# Patient Record
Sex: FEMALE | Race: BLACK OR AFRICAN AMERICAN | Employment: UNEMPLOYED | URBAN - METROPOLITAN AREA
[De-identification: names, ages, dates, MRNs, and addresses within clinical notes are randomized per-mention and may not be internally consistent; named-entity substitution may affect disease eponyms.]

---

## 2017-10-31 ENCOUNTER — HOSPITAL ENCOUNTER (OUTPATIENT)
Dept: MAMMOGRAPHY | Age: 52
Discharge: HOME | End: 2017-10-31
Attending: FAMILY MEDICINE
Payer: COMMERCIAL

## 2017-10-31 DIAGNOSIS — Z12.31 VISIT FOR SCREENING MAMMOGRAM: ICD-10-CM

## 2017-10-31 PROCEDURE — 77067 SCR MAMMO BI INCL CAD: CPT

## 2018-07-02 ENCOUNTER — HOSPITAL ENCOUNTER (EMERGENCY)
Age: 53
Discharge: HOME | End: 2018-07-03
Attending: EMERGENCY MEDICINE
Payer: COMMERCIAL

## 2018-07-02 ENCOUNTER — APPOINTMENT (OUTPATIENT)
Dept: GENERAL RADIOLOGY | Age: 53
End: 2018-07-02
Attending: PHYSICIAN ASSISTANT
Payer: COMMERCIAL

## 2018-07-02 ENCOUNTER — APPOINTMENT (OUTPATIENT)
Dept: CT IMAGING | Age: 53
End: 2018-07-02
Attending: EMERGENCY MEDICINE
Payer: COMMERCIAL

## 2018-07-02 VITALS
WEIGHT: 242.51 LBS | DIASTOLIC BLOOD PRESSURE: 77 MMHG | TEMPERATURE: 98.8 F | HEART RATE: 95 BPM | OXYGEN SATURATION: 97 % | RESPIRATION RATE: 18 BRPM | SYSTOLIC BLOOD PRESSURE: 123 MMHG

## 2018-07-02 DIAGNOSIS — R07.89 ATYPICAL CHEST PAIN: Primary | ICD-10-CM

## 2018-07-02 LAB
ALBUMIN SERPL-MCNC: 3.7 G/DL (ref 3.5–5)
ALBUMIN/GLOB SERPL: 0.9 {RATIO} (ref 1.1–2.2)
ALP SERPL-CCNC: 49 U/L (ref 45–117)
ALT SERPL-CCNC: 19 U/L (ref 12–78)
ANION GAP SERPL CALC-SCNC: 10 MMOL/L (ref 5–15)
APTT PPP: 37 SEC (ref 22.1–32)
AST SERPL-CCNC: 15 U/L (ref 15–37)
BASOPHILS # BLD: 0 K/UL (ref 0–0.1)
BASOPHILS NFR BLD: 0 % (ref 0–1)
BILIRUB SERPL-MCNC: 0.9 MG/DL (ref 0.2–1)
BUN SERPL-MCNC: 13 MG/DL (ref 6–20)
BUN/CREAT SERPL: 12 (ref 12–20)
CALCIUM SERPL-MCNC: 8.9 MG/DL (ref 8.5–10.1)
CHLORIDE SERPL-SCNC: 102 MMOL/L (ref 97–108)
CK MB CFR SERPL CALC: NORMAL % (ref 0–2.5)
CK MB SERPL-MCNC: <1 NG/ML (ref 5–25)
CK SERPL-CCNC: 89 U/L (ref 26–192)
CO2 SERPL-SCNC: 28 MMOL/L (ref 21–32)
CREAT SERPL-MCNC: 1.05 MG/DL (ref 0.55–1.02)
D DIMER PPP FEU-MCNC: 0.22 MG/L FEU (ref 0–0.65)
DIFFERENTIAL METHOD BLD: ABNORMAL
EOSINOPHIL # BLD: 0.1 K/UL (ref 0–0.4)
EOSINOPHIL NFR BLD: 1 % (ref 0–7)
ERYTHROCYTE [DISTWIDTH] IN BLOOD BY AUTOMATED COUNT: 20.3 % (ref 11.5–14.5)
GLOBULIN SER CALC-MCNC: 4.2 G/DL (ref 2–4)
GLUCOSE SERPL-MCNC: 94 MG/DL (ref 65–100)
HCT VFR BLD AUTO: 36.1 % (ref 35–47)
HGB BLD-MCNC: 12.1 G/DL (ref 11.5–16)
IMM GRANULOCYTES # BLD: 0 K/UL (ref 0–0.04)
IMM GRANULOCYTES NFR BLD AUTO: 0 % (ref 0–0.5)
INR PPP: 2.6 (ref 0.9–1.1)
LIPASE SERPL-CCNC: 171 U/L (ref 73–393)
LYMPHOCYTES # BLD: 1.6 K/UL (ref 0.8–3.5)
LYMPHOCYTES NFR BLD: 25 % (ref 12–49)
MCH RBC QN AUTO: 27.1 PG (ref 26–34)
MCHC RBC AUTO-ENTMCNC: 33.5 G/DL (ref 30–36.5)
MCV RBC AUTO: 80.8 FL (ref 80–99)
MONOCYTES # BLD: 0.4 K/UL (ref 0–1)
MONOCYTES NFR BLD: 7 % (ref 5–13)
NEUTS SEG # BLD: 4.1 K/UL (ref 1.8–8)
NEUTS SEG NFR BLD: 67 % (ref 32–75)
NRBC # BLD: 0 K/UL (ref 0–0.01)
NRBC BLD-RTO: 0 PER 100 WBC
PLATELET # BLD AUTO: 278 K/UL (ref 150–400)
PMV BLD AUTO: 9.4 FL (ref 8.9–12.9)
POTASSIUM SERPL-SCNC: 3.6 MMOL/L (ref 3.5–5.1)
PROT SERPL-MCNC: 7.9 G/DL (ref 6.4–8.2)
PROTHROMBIN TIME: 26.1 SEC (ref 9–11.1)
RBC # BLD AUTO: 4.47 M/UL (ref 3.8–5.2)
RBC MORPH BLD: ABNORMAL
SODIUM SERPL-SCNC: 140 MMOL/L (ref 136–145)
THERAPEUTIC RANGE,PTTT: ABNORMAL SECS (ref 58–77)
TROPONIN I SERPL-MCNC: <0.05 NG/ML
WBC # BLD AUTO: 6.2 K/UL (ref 3.6–11)

## 2018-07-02 PROCEDURE — 83690 ASSAY OF LIPASE: CPT | Performed by: PHYSICIAN ASSISTANT

## 2018-07-02 PROCEDURE — 74011000250 HC RX REV CODE- 250: Performed by: EMERGENCY MEDICINE

## 2018-07-02 PROCEDURE — 84484 ASSAY OF TROPONIN QUANT: CPT | Performed by: PHYSICIAN ASSISTANT

## 2018-07-02 PROCEDURE — 80053 COMPREHEN METABOLIC PANEL: CPT | Performed by: PHYSICIAN ASSISTANT

## 2018-07-02 PROCEDURE — 93005 ELECTROCARDIOGRAM TRACING: CPT

## 2018-07-02 PROCEDURE — 85379 FIBRIN DEGRADATION QUANT: CPT | Performed by: PHYSICIAN ASSISTANT

## 2018-07-02 PROCEDURE — 85025 COMPLETE CBC W/AUTO DIFF WBC: CPT | Performed by: PHYSICIAN ASSISTANT

## 2018-07-02 PROCEDURE — 82550 ASSAY OF CK (CPK): CPT | Performed by: PHYSICIAN ASSISTANT

## 2018-07-02 PROCEDURE — 36415 COLL VENOUS BLD VENIPUNCTURE: CPT | Performed by: PHYSICIAN ASSISTANT

## 2018-07-02 PROCEDURE — 85730 THROMBOPLASTIN TIME PARTIAL: CPT | Performed by: PHYSICIAN ASSISTANT

## 2018-07-02 PROCEDURE — 74011250636 HC RX REV CODE- 250/636: Performed by: EMERGENCY MEDICINE

## 2018-07-02 PROCEDURE — 74011636320 HC RX REV CODE- 636/320: Performed by: RADIOLOGY

## 2018-07-02 PROCEDURE — 74011250637 HC RX REV CODE- 250/637: Performed by: EMERGENCY MEDICINE

## 2018-07-02 PROCEDURE — 85610 PROTHROMBIN TIME: CPT | Performed by: PHYSICIAN ASSISTANT

## 2018-07-02 PROCEDURE — 71046 X-RAY EXAM CHEST 2 VIEWS: CPT

## 2018-07-02 PROCEDURE — 71275 CT ANGIOGRAPHY CHEST: CPT

## 2018-07-02 PROCEDURE — 99284 EMERGENCY DEPT VISIT MOD MDM: CPT

## 2018-07-02 RX ORDER — HYDROCHLOROTHIAZIDE 12.5 MG/1
12.5 TABLET ORAL DAILY
COMMUNITY
End: 2018-11-19 | Stop reason: SDUPTHER

## 2018-07-02 RX ORDER — LIDOCAINE HYDROCHLORIDE 20 MG/ML
15 SOLUTION OROPHARYNGEAL
Status: COMPLETED | OUTPATIENT
Start: 2018-07-02 | End: 2018-07-02

## 2018-07-02 RX ORDER — WARFARIN SODIUM 5 MG/1
5 TABLET ORAL DAILY
COMMUNITY
End: 2019-10-21 | Stop reason: ALTCHOICE

## 2018-07-02 RX ORDER — FAMOTIDINE 20 MG/1
20 TABLET, FILM COATED ORAL
Status: COMPLETED | OUTPATIENT
Start: 2018-07-02 | End: 2018-07-02

## 2018-07-02 RX ORDER — SODIUM CHLORIDE 0.9 % (FLUSH) 0.9 %
10 SYRINGE (ML) INJECTION
Status: COMPLETED | OUTPATIENT
Start: 2018-07-02 | End: 2018-07-02

## 2018-07-02 RX ORDER — SODIUM CHLORIDE 9 MG/ML
50 INJECTION, SOLUTION INTRAVENOUS
Status: COMPLETED | OUTPATIENT
Start: 2018-07-02 | End: 2018-07-03

## 2018-07-02 RX ORDER — LANOLIN ALCOHOL/MO/W.PET/CERES
325 CREAM (GRAM) TOPICAL 2 TIMES DAILY
COMMUNITY
End: 2020-07-29 | Stop reason: CLARIF

## 2018-07-02 RX ORDER — AMLODIPINE BESYLATE 10 MG/1
10 TABLET ORAL DAILY
COMMUNITY
End: 2018-11-19 | Stop reason: SDUPTHER

## 2018-07-02 RX ORDER — PANTOPRAZOLE SODIUM 40 MG/1
40 TABLET, DELAYED RELEASE ORAL DAILY
Qty: 20 TAB | Refills: 0 | Status: SHIPPED | OUTPATIENT
Start: 2018-07-02 | End: 2018-07-03

## 2018-07-02 RX ADMIN — IOPAMIDOL 80 ML: 755 INJECTION, SOLUTION INTRAVENOUS at 23:53

## 2018-07-02 RX ADMIN — ALUMINUM HYDROXIDE AND MAGNESIUM HYDROXIDE 30 ML: 200; 200 SUSPENSION ORAL at 22:25

## 2018-07-02 RX ADMIN — SODIUM CHLORIDE 50 ML/HR: 900 INJECTION, SOLUTION INTRAVENOUS at 23:54

## 2018-07-02 RX ADMIN — LIDOCAINE HYDROCHLORIDE 15 ML: 20 SOLUTION ORAL; TOPICAL at 22:25

## 2018-07-02 RX ADMIN — FAMOTIDINE 20 MG: 20 TABLET, FILM COATED ORAL at 22:25

## 2018-07-02 RX ADMIN — Medication 10 ML: at 23:54

## 2018-07-02 NOTE — ED PROVIDER NOTES
EMERGENCY DEPARTMENT HISTORY AND PHYSICAL EXAM      Date: 7/2/2018  Patient Name: Martir Daigle     PROVIDER IN TRIAGE NOTE:  4:02 PM  Estela Sewell PA-C has evaluated the patient as the Provider in Triage (PIT) for chest pressure with a Hx of bilateral PE's. The vital signs and the triage nurse assessment have been reviewed. The patient and any available family have been advised that the appropriate studies have been ordered to initiate the work up based on the clinical presentation during the assessment. The pt has been advised that they will be accommodated in a monitored ED bed as soon as possible. The pt has been requested to contact the triage nurse or PIT immediately if they experiences any changes in their condition during this brief waiting period. History of Presenting Illness     Chief Complaint   Patient presents with    Chest Pain     c/o chest pressure x three days; states \"belching makes me feel better\" hx of DVT/ PE       History Provided By: Patient    HPI: Martri Daigle, 47 y.o. female with PMHx significant for HTN, DVTs, PEs, presents ambulatory to the ED with cc of intermittent, pressuring mid-sternal chest pain x3 days. Pt states her pain is only temporarily relieved relieved by belching. Of note, the pt works as a . Pt also notes she is currently on Coumadin for PEs and BLE DVTs, that were diagnosed via CTA/doppler on 4/22/2018. Pt notes she was seen by her PCP, Dr. Rajni Yang, today where she was recommended to be further evaluated at the ED. She denies any recent changes in medications or coumadin dosing since starting the medication. She denies taking any medications to modify her sxs. Pt specifically denies any fever, chills, cough, congestion, shortness of breath, abdominal pain, nausea, vomiting, diarrhea, dysuria, or urinary frequency.     PMHx: Significant for HTN, DVTs, PEs  PSHx: Significant for Orthopedic   Social Hx: -tobacco, -EtOH, -Illicit Drugs     There are no other complaints, changes, or physical findings at this time. PCP: Bradley Salazar MD        Past History     Past Medical History:  Past Medical History:   Diagnosis Date    Hypertension     Thromboembolus ([address]     \"and PEs\"       Past Surgical History:  Past Surgical History:   Procedure Laterality Date    HX ORTHOPAEDIC      right hand       Family History:  History reviewed. No pertinent family history. Social History:  Social History   Substance Use Topics    Smoking status: Never Smoker    Smokeless tobacco: None    Alcohol use Yes      Comment: occ       Allergies:  No Known Allergies      Review of Systems   Review of Systems   Constitutional: Negative. Negative for fever. Eyes: Negative. Respiratory: Negative. Negative for shortness of breath. Cardiovascular: Positive for chest pain. Gastrointestinal: Negative for abdominal pain, nausea and vomiting. Endocrine: Negative. Genitourinary: Negative. Negative for difficulty urinating, dysuria and hematuria. Musculoskeletal: Negative. Skin: Negative. Allergic/Immunologic: Negative. Neurological: Negative. Psychiatric/Behavioral: Negative for suicidal ideas. All other systems reviewed and are negative. Physical Exam   Physical Exam   Constitutional: She is oriented to person, place, and time. She appears well-developed and well-nourished. No distress. HENT:   Head: Normocephalic and atraumatic. Nose: Nose normal.   Eyes: Conjunctivae and EOM are normal. No scleral icterus. Neck: Normal range of motion. No tracheal deviation present. Cardiovascular: Normal rate, regular rhythm, normal heart sounds and intact distal pulses. Exam reveals no friction rub. No murmur heard. Pulmonary/Chest: Effort normal and breath sounds normal. No stridor. No respiratory distress. She has no wheezes. She has no rales. Pt able to speak in full, unlabored sentences. Abdominal: Soft.  Bowel sounds are normal. She exhibits no distension. There is no tenderness. There is no rebound. Musculoskeletal: Normal range of motion. She exhibits no tenderness. Neurological: She is alert and oriented to person, place, and time. No cranial nerve deficit. Skin: Skin is warm and dry. No rash noted. She is not diaphoretic. Psychiatric: She has a normal mood and affect. Her speech is normal and behavior is normal. Judgment and thought content normal. Cognition and memory are normal.   Nursing note and vitals reviewed. Diagnostic Study Results     Labs -     Recent Results (from the past 12 hour(s))   EKG, 12 LEAD, INITIAL    Collection Time: 07/02/18  4:06 PM   Result Value Ref Range    Ventricular Rate 85 BPM    Atrial Rate 85 BPM    P-R Interval 144 ms    QRS Duration 88 ms    Q-T Interval 380 ms    QTC Calculation (Bezet) 452 ms    Calculated P Axis 56 degrees    Calculated R Axis -3 degrees    Calculated T Axis 26 degrees    Diagnosis       Sinus rhythm with occasional premature ventricular complexes  Possible Left atrial enlargement  Low voltage QRS  No previous ECGs available     CBC WITH AUTOMATED DIFF    Collection Time: 07/02/18  4:45 PM   Result Value Ref Range    WBC 6.2 3.6 - 11.0 K/uL    RBC 4.47 3.80 - 5.20 M/uL    HGB 12.1 11.5 - 16.0 g/dL    HCT 36.1 35.0 - 47.0 %    MCV 80.8 80.0 - 99.0 FL    MCH 27.1 26.0 - 34.0 PG    MCHC 33.5 30.0 - 36.5 g/dL    RDW 20.3 (H) 11.5 - 14.5 %    PLATELET 329 389 - 744 K/uL    MPV 9.4 8.9 - 12.9 FL    NRBC 0.0 0  WBC    ABSOLUTE NRBC 0.00 0.00 - 0.01 K/uL    NEUTROPHILS 67 32 - 75 %    LYMPHOCYTES 25 12 - 49 %    MONOCYTES 7 5 - 13 %    EOSINOPHILS 1 0 - 7 %    BASOPHILS 0 0 - 1 %    IMMATURE GRANULOCYTES 0 0.0 - 0.5 %    ABS. NEUTROPHILS 4.1 1.8 - 8.0 K/UL    ABS. LYMPHOCYTES 1.6 0.8 - 3.5 K/UL    ABS. MONOCYTES 0.4 0.0 - 1.0 K/UL    ABS. EOSINOPHILS 0.1 0.0 - 0.4 K/UL    ABS. BASOPHILS 0.0 0.0 - 0.1 K/UL    ABS. IMM.  GRANS. 0.0 0.00 - 0.04 K/UL    DF SMEAR SCANNED      RBC COMMENTS ANISOCYTOSIS  2+       METABOLIC PANEL, COMPREHENSIVE    Collection Time: 07/02/18  4:45 PM   Result Value Ref Range    Sodium 140 136 - 145 mmol/L    Potassium 3.6 3.5 - 5.1 mmol/L    Chloride 102 97 - 108 mmol/L    CO2 28 21 - 32 mmol/L    Anion gap 10 5 - 15 mmol/L    Glucose 94 65 - 100 mg/dL    BUN 13 6 - 20 MG/DL    Creatinine 1.05 (H) 0.55 - 1.02 MG/DL    BUN/Creatinine ratio 12 12 - 20      GFR est AA >60 >60 ml/min/1.73m2    GFR est non-AA 55 (L) >60 ml/min/1.73m2    Calcium 8.9 8.5 - 10.1 MG/DL    Bilirubin, total 0.9 0.2 - 1.0 MG/DL    ALT (SGPT) 19 12 - 78 U/L    AST (SGOT) 15 15 - 37 U/L    Alk.  phosphatase 49 45 - 117 U/L    Protein, total 7.9 6.4 - 8.2 g/dL    Albumin 3.7 3.5 - 5.0 g/dL    Globulin 4.2 (H) 2.0 - 4.0 g/dL    A-G Ratio 0.9 (L) 1.1 - 2.2     TROPONIN I    Collection Time: 07/02/18  4:45 PM   Result Value Ref Range    Troponin-I, Qt. <0.05 <0.05 ng/mL   CK W/ CKMB & INDEX    Collection Time: 07/02/18  4:45 PM   Result Value Ref Range    CK 89 26 - 192 U/L    CK - MB <1.0 <3.6 NG/ML    CK-MB Index Cannot be calculated 0 - 2.5     D DIMER    Collection Time: 07/02/18  4:45 PM   Result Value Ref Range    D-dimer 0.22 0.00 - 0.65 mg/L FEU   LIPASE    Collection Time: 07/02/18  4:45 PM   Result Value Ref Range    Lipase 171 73 - 393 U/L   PROTHROMBIN TIME + INR    Collection Time: 07/02/18  4:45 PM   Result Value Ref Range    INR 2.6 (H) 0.9 - 1.1      Prothrombin time 26.1 (H) 9.0 - 11.1 sec   PTT    Collection Time: 07/02/18  4:45 PM   Result Value Ref Range    aPTT 37.0 (H) 22.1 - 32.0 sec    aPTT, therapeutic range     58.0 - 77.0 SECS       Radiologic Studies -   CTA CHEST W OR W WO CONT   Final Result      XR CHEST PA LAT   Final Result        CT Results  (Last 48 hours)               07/02/18 3852  CTA CHEST W OR W WO CONT Final result    Impression:  IMPRESSION: No acute chest process identified               Narrative:  EXAM:  CTA CHEST W OR W WO CONT       INDICATION: Non-anginal chest pain; cp, h/o multiple pe's       COMPARISON: None. CONTRAST: 80 mL of Isovue-370. TECHNIQUE:    Precontrast  images were obtained to localize the volume for acquisition. Multislice helical CT arteriography was performed from the diaphragm to the   thoracic inlet during uneventful rapid bolus intravenous contrast   administration. Lung and soft tissue windows were generated. Coronal and   sagittal images were generated and 3D post processing consisting of coronal   maximum intensity images was performed. CT dose reduction was achieved through   use of a standardized protocol tailored for this examination and automatic   exposure control for dose modulation. FINDINGS:   The lungs are clear of mass, nodule, airspace disease or edema. The pulmonary arteries are well enhanced and no pulmonary emboli are identified. There is no mediastinal or hilar adenopathy or mass. The aorta enhances normally   without evidence of aneurysm or dissection. The visualized portions of the upper abdominal organs are normal.       There is a partially calcified right upper outer quadrant breast mass that   measures 11 mm. CXR Results  (Last 48 hours)               07/02/18 1819  XR CHEST PA LAT Final result    Impression:  IMPRESSION: No acute abnormality                       Narrative:  EXAM:  XR CHEST PA LAT       INDICATION:   pain       COMPARISON: None. FINDINGS: PA and lateral radiographs of the chest demonstrate clear lungs. The   cardiac and mediastinal contours and pulmonary vascularity are normal.  The   bones and soft tissues are within normal limits. Medical Decision Making   I am the first provider for this patient. I reviewed the vital signs, available nursing notes, past medical history, past surgical history, family history and social history. Vital Signs-Reviewed the patient's vital signs.   Patient Vitals for the past 12 hrs:   Temp Pulse Resp BP SpO2   07/02/18 1602 98.8 °F (37.1 °C) 95 18 123/77 97 %       Pulse Oximetry Analysis - 110% on RA    Cardiac Monitor:   Rate: 95 bpm  Rhythm: Normal Sinus Rhythm      Records Reviewed: Nursing Notes and Old Medical Records    Provider Notes (Medical Decision Making):   DDX:  Acs, pe, pna, gerd, pancreatitis    Plan:  Labs, d dimer, trop/ck, cxr, gi cocktail, ekg    Impression:  Atypical chest pain    ED Course:   Initial assessment performed. The patients presenting problems have been discussed, and they are in agreement with the care plan formulated and outlined with them. I have encouraged them to ask questions as they arise throughout their visit. I reviewed our electronic medical record system for any past medical records that were available that may contribute to the patients current condition, the nursing notes and and vital signs from today's visit    Nursing notes will be reviewed as they become available in realtime while the pt has been in the ED. Minal Albert MD    EKG interpretation 1606: NSR, nl Axis, rate 85; , QRS 88, QTc 452; no acute ischemia; Minal Albert MD    I personally reviewed pt's imaging. Official read by radiology listed below. Minal Albert MD    PROGRESS NOTE  9:56 PM   Pt with negative lab or ekg w/u, however, pt is not a low risk candidate with d dimer, noted recent bilateral/multiple pe's, will eval with CTA and GI cocktail. Minal Albert MD      12:54 AM  Progress note:  Pt noted to be feeling better, ready for discharge. Discussed lab and imaging findings with pt and/or family, specifically noting negative cardiac work up. Pt will follow up as instructed. All questions have been answered, pt voiced understanding and agreement with plan. If narcotics were prescribed, pt was advised not to drive or operate heavy machinery.  If abx were prescribed, pt advised that diarrhea and rash are possible side effects of the medications. Specific return precautions provided in addition to instructions for pt to return to the ED immediately should sx worsen at any time. Deepthi Carrasquillo MD        Critical Care Time:   None    Disposition:  12:55 AM   The patient's results have been reviewed with family and/or caregiver. They verbally convey their understanding and agreement of the patient's signs, symptoms, diagnosis, treatment and prognosis and additionally agree to follow up as recommended in the discharge instructions or to return to the Emergency Room should the patient's condition change prior to their follow-up appointment. The family and/or caregiver verbally agrees with the care-plan and all of their questions have been answered. The discharge instructions have also been provided to the them with educational information regarding the patient's diagnosis as well a list of reasons why the patient would want to return to the ER prior to their follow-up appointment should their condition change. Deepthi Carrasquillo MD      PLAN:  1. Current Discharge Medication List      START taking these medications    Details   pantoprazole (PROTONIX) 40 mg tablet Take 1 Tab by mouth daily for 20 days. Qty: 20 Tab, Refills: 0           2. Follow-up Information     Follow up With Details Comments Contact Info    Jenny Mccartney MD Schedule an appointment as soon as possible for a visit in 2 days  [place] [id number] [phone]      Your cardiologist Schedule an appointment as soon as possible for a visit in 2 days      Muna Nettles MD Schedule an appointment as soon as possible for a visit in 2 days  [address] [address]  [phone]          Return to ED if worse     Diagnosis     Clinical Impression:   1. Atypical chest pain        Attestations:     This note is prepared by Cliff Mac, acting as Scribe for Cliff Mac      The scribe's documentation has been prepared under my direction and personally reviewed by me in its entirety. I confirm that the note above accurately reflects all work, treatment, procedures, and medical decision making performed by me. Naresh Harp MD          This note will not be viewable in [address]

## 2018-07-03 LAB
ATRIAL RATE: 85 BPM
CALCULATED P AXIS, ECG09: 56 DEGREES
CALCULATED R AXIS, ECG10: -3 DEGREES
CALCULATED T AXIS, ECG11: 26 DEGREES
DIAGNOSIS, 93000: NORMAL
P-R INTERVAL, ECG05: 144 MS
Q-T INTERVAL, ECG07: 380 MS
QRS DURATION, ECG06: 88 MS
QTC CALCULATION (BEZET), ECG08: 452 MS
VENTRICULAR RATE, ECG03: 85 BPM

## 2018-07-03 RX ORDER — PANTOPRAZOLE SODIUM 40 MG/1
40 TABLET, DELAYED RELEASE ORAL DAILY
Qty: 20 TAB | Refills: 0 | Status: SHIPPED | OUTPATIENT
Start: 2018-07-03 | End: 2018-07-23

## 2018-07-03 NOTE — DISCHARGE INSTRUCTIONS
Chest Pain: Care Instructions  Your Care Instructions    There are many things that can cause chest pain. Some are not serious and will get better on their own in a few days. But some kinds of chest pain need more testing and treatment. Your doctor may have recommended a follow-up visit in the next 8 to 12 hours. If you are not getting better, you may need more tests or treatment. Even though your doctor has released you, you still need to watch for any problems. The doctor carefully checked you, but sometimes problems can develop later. If you have new symptoms or if your symptoms do not get better, get medical care right away. If you have worse or different chest pain or pressure that lasts more than 5 minutes or you passed out (lost consciousness), call 911 or seek other emergency help right away. A medical visit is only one step in your treatment. Even if you feel better, you still need to do what your doctor recommends, such as going to all suggested follow-up appointments and taking medicines exactly as directed. This will help you recover and help prevent future problems. How can you care for yourself at home? · Rest until you feel better. · Take your medicine exactly as prescribed. Call your doctor if you think you are having a problem with your medicine. · Do not drive after taking a prescription pain medicine. When should you call for help? Call 911 if:  ? · You passed out (lost consciousness). ? · You have severe difficulty breathing. ? · You have symptoms of a heart attack. These may include:  ¨ Chest pain or pressure, or a strange feeling in your chest.  ¨ Sweating. ¨ Shortness of breath. ¨ Nausea or vomiting. ¨ Pain, pressure, or a strange feeling in your back, neck, jaw, or upper belly or in one or both shoulders or arms. ¨ Lightheadedness or sudden weakness. ¨ A fast or irregular heartbeat.   After you call 911, the  may tell you to chew 1 adult-strength or 2 to 4 low-dose aspirin. Wait for an ambulance. Do not try to drive yourself. ?Call your doctor today if:  ? · You have any trouble breathing. ? · Your chest pain gets worse. ? · You are dizzy or lightheaded, or you feel like you may faint. ? · You are not getting better as expected. ? · You are having new or different chest pain. Where can you learn more? Go to http://moses-[facility]errat.info/. Enter A120 in the search box to learn more about \"Chest Pain: Care Instructions. \"  Current as of: March 20, 2017  Content Version: 11.4  © 2633-6727 [employer]. Care instructions adapted under license by [employer] (which disclaims liability or warranty for this information). If you have questions about a medical condition or this instruction, always ask your healthcare professional. [address] any warranty or liability for your use of this information.

## 2018-07-03 NOTE — ED NOTES
Discharge instructions reviewed with pt. Discharge instructions given to pt per Dr. Avanell Koyanagi. Pt able to return/verbalize discharge instructions. Copy of discharge instructions given. Pt condition stable, respiratory status within normal limits, neuro status intact. Pt ambulatory with steady gait out of ER, accompanied by family. Patient out of ED prior to obtaining discharge vitals.

## 2018-08-31 ENCOUNTER — OFFICE VISIT (OUTPATIENT)
Dept: CARDIOLOGY CLINIC | Age: 53
End: 2018-08-31

## 2018-08-31 VITALS
HEIGHT: 60 IN | SYSTOLIC BLOOD PRESSURE: 118 MMHG | RESPIRATION RATE: 18 BRPM | OXYGEN SATURATION: 96 % | BODY MASS INDEX: 46.92 KG/M2 | DIASTOLIC BLOOD PRESSURE: 70 MMHG | WEIGHT: 239 LBS | HEART RATE: 86 BPM

## 2018-08-31 DIAGNOSIS — I26.99 OTHER PULMONARY EMBOLISM WITHOUT ACUTE COR PULMONALE, UNSPECIFIED CHRONICITY (HCC): ICD-10-CM

## 2018-08-31 DIAGNOSIS — Z86.718 PERSONAL HISTORY OF DVT (DEEP VEIN THROMBOSIS): ICD-10-CM

## 2018-08-31 DIAGNOSIS — I49.8 FLUTTERING HEART: Primary | ICD-10-CM

## 2018-08-31 DIAGNOSIS — E66.01 OBESITY, MORBID (HCC): ICD-10-CM

## 2018-08-31 RX ORDER — PANTOPRAZOLE SODIUM 40 MG/1
40 TABLET, DELAYED RELEASE ORAL DAILY
COMMUNITY
Start: 2018-08-25 | End: 2020-05-19

## 2018-08-31 RX ORDER — TRAMADOL HYDROCHLORIDE 50 MG/1
50 TABLET ORAL 2 TIMES DAILY
COMMUNITY
Start: 2018-08-13 | End: 2018-10-15

## 2018-08-31 RX ORDER — HYDROCHLOROTHIAZIDE 12.5 MG/1
1 TABLET ORAL DAILY
COMMUNITY
Start: 2018-08-25 | End: 2018-11-19 | Stop reason: ALTCHOICE

## 2018-08-31 RX ORDER — MEDROXYPROGESTERONE ACETATE 10 MG/1
10 TABLET ORAL DAILY
COMMUNITY
Start: 2018-08-13 | End: 2020-07-29 | Stop reason: CLARIF

## 2018-08-31 RX ORDER — AMLODIPINE BESYLATE 10 MG/1
10 TABLET ORAL DAILY
COMMUNITY
Start: 2018-06-21 | End: 2018-11-19 | Stop reason: ALTCHOICE

## 2018-08-31 RX ORDER — WARFARIN SODIUM 5 MG/1
5 TABLET ORAL DAILY
COMMUNITY
Start: 2018-08-13 | End: 2018-11-19 | Stop reason: DRUGHIGH

## 2018-08-31 NOTE — PROGRESS NOTES
1. Have you been to the ER, urgent care clinic since your last visit? Hospitalized since your last visit? YES, [address] [place] [address]. April 2018,  
 
2. Have you seen or consulted any other health care providers outside of the [address] [place] [address] since your last visit? Include any pap smears or colon screening. YES, PCP. NEW PATIENT. C/O FLUTTER IN CHEST OFF AND ON, SWELLING IN BLE.

## 2018-09-04 ENCOUNTER — APPOINTMENT (OUTPATIENT)
Dept: GENERAL RADIOLOGY | Age: 53
End: 2018-09-04
Attending: EMERGENCY MEDICINE
Payer: COMMERCIAL

## 2018-09-04 ENCOUNTER — HOSPITAL ENCOUNTER (EMERGENCY)
Age: 53
Discharge: HOME | End: 2018-09-04
Attending: EMERGENCY MEDICINE
Payer: COMMERCIAL

## 2018-09-04 VITALS
RESPIRATION RATE: 18 BRPM | TEMPERATURE: 98 F | HEIGHT: 62 IN | DIASTOLIC BLOOD PRESSURE: 82 MMHG | WEIGHT: 238.54 LBS | HEART RATE: 82 BPM | BODY MASS INDEX: 43.9 KG/M2 | SYSTOLIC BLOOD PRESSURE: 120 MMHG | OXYGEN SATURATION: 100 %

## 2018-09-04 DIAGNOSIS — R07.89 ATYPICAL CHEST PAIN: Primary | ICD-10-CM

## 2018-09-04 LAB
ALBUMIN SERPL-MCNC: 3.4 G/DL (ref 3.5–5)
ALBUMIN/GLOB SERPL: 0.9 {RATIO} (ref 1.1–2.2)
ALP SERPL-CCNC: 46 U/L (ref 45–117)
ALT SERPL-CCNC: 14 U/L (ref 12–78)
ANION GAP SERPL CALC-SCNC: 10 MMOL/L (ref 5–15)
AST SERPL-CCNC: 16 U/L (ref 15–37)
ATRIAL RATE: 87 BPM
BASOPHILS # BLD: 0 K/UL (ref 0–0.1)
BASOPHILS NFR BLD: 0 % (ref 0–1)
BILIRUB SERPL-MCNC: 1 MG/DL (ref 0.2–1)
BUN SERPL-MCNC: 18 MG/DL (ref 6–20)
BUN/CREAT SERPL: 16 (ref 12–20)
CALCIUM SERPL-MCNC: 8.4 MG/DL (ref 8.5–10.1)
CALCULATED P AXIS, ECG09: 43 DEGREES
CALCULATED R AXIS, ECG10: -8 DEGREES
CALCULATED T AXIS, ECG11: 35 DEGREES
CHLORIDE SERPL-SCNC: 105 MMOL/L (ref 97–108)
CO2 SERPL-SCNC: 27 MMOL/L (ref 21–32)
CREAT SERPL-MCNC: 1.12 MG/DL (ref 0.55–1.02)
DIAGNOSIS, 93000: NORMAL
DIFFERENTIAL METHOD BLD: ABNORMAL
EOSINOPHIL # BLD: 0.1 K/UL (ref 0–0.4)
EOSINOPHIL NFR BLD: 1 % (ref 0–7)
ERYTHROCYTE [DISTWIDTH] IN BLOOD BY AUTOMATED COUNT: 13 % (ref 11.5–14.5)
GLOBULIN SER CALC-MCNC: 3.8 G/DL (ref 2–4)
GLUCOSE SERPL-MCNC: 102 MG/DL (ref 65–100)
HCT VFR BLD AUTO: 33.3 % (ref 35–47)
HGB BLD-MCNC: 11.4 G/DL (ref 11.5–16)
IMM GRANULOCYTES # BLD: 0 K/UL (ref 0–0.04)
IMM GRANULOCYTES NFR BLD AUTO: 1 % (ref 0–0.5)
INR PPP: 2.6 (ref 0.9–1.1)
LYMPHOCYTES # BLD: 1.1 K/UL (ref 0.8–3.5)
LYMPHOCYTES NFR BLD: 16 % (ref 12–49)
MCH RBC QN AUTO: 29.7 PG (ref 26–34)
MCHC RBC AUTO-ENTMCNC: 34.2 G/DL (ref 30–36.5)
MCV RBC AUTO: 86.7 FL (ref 80–99)
MONOCYTES # BLD: 0.5 K/UL (ref 0–1)
MONOCYTES NFR BLD: 7 % (ref 5–13)
NEUTS SEG # BLD: 5 K/UL (ref 1.8–8)
NEUTS SEG NFR BLD: 75 % (ref 32–75)
NRBC # BLD: 0 K/UL (ref 0–0.01)
NRBC BLD-RTO: 0 PER 100 WBC
P-R INTERVAL, ECG05: 132 MS
PLATELET # BLD AUTO: 248 K/UL (ref 150–400)
PMV BLD AUTO: 9 FL (ref 8.9–12.9)
POTASSIUM SERPL-SCNC: 3.6 MMOL/L (ref 3.5–5.1)
PROT SERPL-MCNC: 7.2 G/DL (ref 6.4–8.2)
PROTHROMBIN TIME: 25.7 SEC (ref 9–11.1)
Q-T INTERVAL, ECG07: 406 MS
QRS DURATION, ECG06: 88 MS
QTC CALCULATION (BEZET), ECG08: 488 MS
RBC # BLD AUTO: 3.84 M/UL (ref 3.8–5.2)
SODIUM SERPL-SCNC: 142 MMOL/L (ref 136–145)
TROPONIN I SERPL-MCNC: <0.05 NG/ML
VENTRICULAR RATE, ECG03: 87 BPM
WBC # BLD AUTO: 6.6 K/UL (ref 3.6–11)

## 2018-09-04 PROCEDURE — 71046 X-RAY EXAM CHEST 2 VIEWS: CPT

## 2018-09-04 PROCEDURE — 36415 COLL VENOUS BLD VENIPUNCTURE: CPT | Performed by: EMERGENCY MEDICINE

## 2018-09-04 PROCEDURE — 85025 COMPLETE CBC W/AUTO DIFF WBC: CPT | Performed by: EMERGENCY MEDICINE

## 2018-09-04 PROCEDURE — 93005 ELECTROCARDIOGRAM TRACING: CPT

## 2018-09-04 PROCEDURE — 84484 ASSAY OF TROPONIN QUANT: CPT | Performed by: EMERGENCY MEDICINE

## 2018-09-04 PROCEDURE — 80053 COMPREHEN METABOLIC PANEL: CPT | Performed by: EMERGENCY MEDICINE

## 2018-09-04 PROCEDURE — 85610 PROTHROMBIN TIME: CPT | Performed by: EMERGENCY MEDICINE

## 2018-09-04 PROCEDURE — 99283 EMERGENCY DEPT VISIT LOW MDM: CPT

## 2018-09-04 RX ORDER — TRAMADOL HYDROCHLORIDE 50 MG/1
50 TABLET ORAL
COMMUNITY
End: 2018-10-15

## 2018-09-04 NOTE — DISCHARGE INSTRUCTIONS
Chest Pain: Care Instructions  Your Care Instructions    There are many things that can cause chest pain. Some are not serious and will get better on their own in a few days. But some kinds of chest pain need more testing and treatment. Your doctor may have recommended a follow-up visit in the next 8 to 12 hours. If you are not getting better, you may need more tests or treatment. Even though your doctor has released you, you still need to watch for any problems. The doctor carefully checked you, but sometimes problems can develop later. If you have new symptoms or if your symptoms do not get better, get medical care right away. If you have worse or different chest pain or pressure that lasts more than 5 minutes or you passed out (lost consciousness), call 911 or seek other emergency help right away. A medical visit is only one step in your treatment. Even if you feel better, you still need to do what your doctor recommends, such as going to all suggested follow-up appointments and taking medicines exactly as directed. This will help you recover and help prevent future problems. How can you care for yourself at home? · Rest until you feel better. · Take your medicine exactly as prescribed. Call your doctor if you think you are having a problem with your medicine. · Do not drive after taking a prescription pain medicine. When should you call for help? Call 911 if:    · You passed out (lost consciousness).     · You have severe difficulty breathing.     · You have symptoms of a heart attack. These may include:  ¨ Chest pain or pressure, or a strange feeling in your chest.  ¨ Sweating. ¨ Shortness of breath. ¨ Nausea or vomiting. ¨ Pain, pressure, or a strange feeling in your back, neck, jaw, or upper belly or in one or both shoulders or arms. ¨ Lightheadedness or sudden weakness. ¨ A fast or irregular heartbeat.   After you call 911, the  may tell you to chew 1 adult-strength or 2 to 4 low-dose aspirin. Wait for an ambulance. Do not try to drive yourself.    Call your doctor today if:    · You have any trouble breathing.     · Your chest pain gets worse.     · You are dizzy or lightheaded, or you feel like you may faint.     · You are not getting better as expected.     · You are having new or different chest pain. Where can you learn more? Go to http://moses-[facility]at.info/. Enter A120 in the search box to learn more about \"Chest Pain: Care Instructions. \"  Current as of: November 20, 2017  Content Version: 11.7  © 1226-3598 [employer]. Care instructions adapted under license by "[employer] (which disclaims liability or warranty for this information). If you have questions about a medical condition or this instruction, always ask your healthcare professional. [address] any warranty or liability for your use of this information.

## 2018-09-04 NOTE — PROGRESS NOTES
Ambulatory cardiology attending physician note: 
 
Gary Morales is a 39-year-old female coming to this office as a new patient after a recent hospitalization for a combination of deep vein thrombosis and pulmonary embolism. She will need to be followed for preventative measures as well as for observation for possible recurrence as well as management of risk factors. If she has not been worked up for coagulation pathology this will need to be added. I participated in the interview and examination followed by full discussion with CARLIE Contreras. Her impression and orders are the synthesis of this discussion.  
 
Sunni Liu MD [facility]

## 2018-09-04 NOTE — ED NOTES
Pt reports reproducible palp/movement, non radiating \"aching\" chest pain to the right upper chest, right of sternum. Pt denies NVD, -SOB, -Hx/Dz, NO leg/calf pain. Onset approx. < 24 hrs. Pt is currently being treated for DVT's w/ warfrin.

## 2018-09-04 NOTE — ED PROVIDER NOTES
EMERGENCY DEPARTMENT HISTORY AND PHYSICAL EXAM      Date: 9/4/2018  Patient Name: Connie Haro    History of Presenting Illness     Chief Complaint   Patient presents with    Chest Pain     pt reports chest pain beginning yesterday, states \"I don't know if its a pulled muscle\"       History Provided By: Patient    HPI: Connie Haro, 47 y.o. female with PMHx significant for DVT, PE, HTN, presents ambulatory to the ED with cc of constant, non-radiating, aching right-sided CP x yesterday. She states that she saw Dr. Kacy Murphy, Cardiologist, 4 days ago for a routine appointment. Pt notes she was diagnosed with DVT and PE in April 2018 after several long distance flights. She states she is still on coumadin and endorses compliance. Pt reports she had a follow up CT chest in July 2018 that was negative for PE. She notes that the bilateral leg swelling is her baseline. Pt denies any recent heavy lifting. She denies cough, hemoptysis, fever, chills, abd pain, or SOB. There are no other complaints, changes, or physical findings at this time. PCP: Joe Yanez MD    Current Outpatient Prescriptions   Medication Sig Dispense Refill    traMADol (ULTRAM) 50 mg tablet Take 50 mg by mouth every six (6) hours as needed for Pain.  amLODIPine (NORVASC) 10 mg tablet Take 10 mg by mouth daily.  hydroCHLOROthiazide (HYDRODIURIL) 12.5 mg tablet Take 12.5 mg by mouth daily.  ferrous sulfate 325 mg (65 mg iron) tablet Take 325 mg by mouth two (2) times a day.  warfarin (COUMADIN) 5 mg tablet Take 5 mg by mouth daily. Mon and FRI take 1 and 1/2 pills (7.5 mg); and all other days take 2 pills (10mg)         Past History     Past Medical History:  Past Medical History:   Diagnosis Date    Hypertension     Thromboembolus ([address]     \"and PEs\"       Past Surgical History:  Past Surgical History:   Procedure Laterality Date    HX ORTHOPAEDIC      right hand       Family History:  History reviewed.  No pertinent family history. Social History:  Social History   Substance Use Topics    Smoking status: Never Smoker    Smokeless tobacco: Never Used    Alcohol use Yes      Comment: occ       Allergies:  No Known Allergies      Review of Systems   Review of Systems   Constitutional: Negative for chills, fatigue and fever. HENT: Negative for congestion, rhinorrhea and sore throat. Eyes: Negative for pain, discharge and visual disturbance. Respiratory: Negative for cough, chest tightness, shortness of breath and wheezing. Cardiovascular: Positive for chest pain (right sided). Negative for palpitations. Gastrointestinal: Negative for abdominal pain, constipation, diarrhea, nausea and vomiting. Genitourinary: Negative for dysuria, frequency and hematuria. Musculoskeletal: Negative for arthralgias, back pain and myalgias. Skin: Negative for rash. Neurological: Negative for dizziness, weakness, light-headedness and headaches. Psychiatric/Behavioral: Negative. Physical Exam   Physical Exam   Constitutional: She is oriented to person, place, and time. She appears well-developed and well-nourished. No distress. HENT:   Head: Normocephalic and atraumatic. Eyes: EOM are normal. Right eye exhibits no discharge. Left eye exhibits no discharge. No scleral icterus. Neck: Normal range of motion. Neck supple. No tracheal deviation present. Cardiovascular: Normal rate, regular rhythm, normal heart sounds and intact distal pulses. Exam reveals no gallop and no friction rub. No murmur heard. Pulmonary/Chest: Effort normal and breath sounds normal. No respiratory distress. She has no wheezes. She has no rales. Right sided chest wall tenderness. Abdominal: Soft. She exhibits no distension. There is no tenderness. Musculoskeletal: Normal range of motion. Lymphadenopathy:     She has no cervical adenopathy. Neurological: She is alert and oriented to person, place, and time.    No focal neuro deficits   Skin: Skin is warm and dry. No rash noted. Psychiatric: She has a normal mood and affect. Nursing note and vitals reviewed. Diagnostic Study Results     Labs -     Recent Results (from the past 12 hour(s))   EKG, 12 LEAD, INITIAL    Collection Time: 09/04/18  8:37 AM   Result Value Ref Range    Ventricular Rate 87 BPM    Atrial Rate 87 BPM    P-R Interval 132 ms    QRS Duration 88 ms    Q-T Interval 406 ms    QTC Calculation (Bezet) 488 ms    Calculated P Axis 43 degrees    Calculated R Axis -8 degrees    Calculated T Axis 35 degrees    Diagnosis       Normal sinus rhythm  Possible Left atrial enlargement  Cannot rule out Anterior infarct , age undetermined  When compared with ECG of 02-JUL-2018 16:06,  premature ventricular complexes are no longer present     CBC WITH AUTOMATED DIFF    Collection Time: 09/04/18  9:12 AM   Result Value Ref Range    WBC 6.6 3.6 - 11.0 K/uL    RBC 3.84 3.80 - 5.20 M/uL    HGB 11.4 (L) 11.5 - 16.0 g/dL    HCT 33.3 (L) 35.0 - 47.0 %    MCV 86.7 80.0 - 99.0 FL    MCH 29.7 26.0 - 34.0 PG    MCHC 34.2 30.0 - 36.5 g/dL    RDW 13.0 11.5 - 14.5 %    PLATELET 092 088 - 063 K/uL    MPV 9.0 8.9 - 12.9 FL    NRBC 0.0 0  WBC    ABSOLUTE NRBC 0.00 0.00 - 0.01 K/uL    NEUTROPHILS 75 32 - 75 %    LYMPHOCYTES 16 12 - 49 %    MONOCYTES 7 5 - 13 %    EOSINOPHILS 1 0 - 7 %    BASOPHILS 0 0 - 1 %    IMMATURE GRANULOCYTES 1 (H) 0.0 - 0.5 %    ABS. NEUTROPHILS 5.0 1.8 - 8.0 K/UL    ABS. LYMPHOCYTES 1.1 0.8 - 3.5 K/UL    ABS. MONOCYTES 0.5 0.0 - 1.0 K/UL    ABS. EOSINOPHILS 0.1 0.0 - 0.4 K/UL    ABS. BASOPHILS 0.0 0.0 - 0.1 K/UL    ABS. IMM.  GRANS. 0.0 0.00 - 0.04 K/UL    DF AUTOMATED     METABOLIC PANEL, COMPREHENSIVE    Collection Time: 09/04/18  9:12 AM   Result Value Ref Range    Sodium 142 136 - 145 mmol/L    Potassium 3.6 3.5 - 5.1 mmol/L    Chloride 105 97 - 108 mmol/L    CO2 27 21 - 32 mmol/L    Anion gap 10 5 - 15 mmol/L    Glucose 102 (H) 65 - 100 mg/dL    BUN 18 6 - 20 MG/DL    Creatinine 1.12 (H) 0.55 - 1.02 MG/DL    BUN/Creatinine ratio 16 12 - 20      GFR est AA >60 >60 ml/min/1.73m2    GFR est non-AA 51 (L) >60 ml/min/1.73m2    Calcium 8.4 (L) 8.5 - 10.1 MG/DL    Bilirubin, total 1.0 0.2 - 1.0 MG/DL    ALT (SGPT) 14 12 - 78 U/L    AST (SGOT) 16 15 - 37 U/L    Alk. phosphatase 46 45 - 117 U/L    Protein, total 7.2 6.4 - 8.2 g/dL    Albumin 3.4 (L) 3.5 - 5.0 g/dL    Globulin 3.8 2.0 - 4.0 g/dL    A-G Ratio 0.9 (L) 1.1 - 2.2     TROPONIN I    Collection Time: 09/04/18  9:12 AM   Result Value Ref Range    Troponin-I, Qt. <0.05 <0.05 ng/mL   PROTHROMBIN TIME + INR    Collection Time: 09/04/18  9:12 AM   Result Value Ref Range    INR 2.6 (H) 0.9 - 1.1      Prothrombin time 25.7 (H) 9.0 - 11.1 sec       Radiologic Studies -   XR CHEST PA LAT   Final Result        CXR Results  (Last 48 hours)               09/04/18 0923  XR CHEST PA LAT Final result    Impression:  IMPRESSION: No acute cardiopulmonary disease. Narrative: Indication:  right sided chest pain x yesterday        Exam: PA and lateral views of the chest.       Direct comparison is made to prior CXR dated July 2, 2018. Findings: Cardiomediastinal silhouette is within normal limits. Lungs are clear   bilaterally. Pleural spaces are normal. Osseous structures are intact. Medical Decision Making   I am the first provider for this patient. I reviewed the vital signs, available nursing notes, past medical history, past surgical history, family history and social history. Vital Signs-Reviewed the patient's vital signs. Patient Vitals for the past 12 hrs:   Temp Pulse Resp BP SpO2   09/04/18 0900 - 82 18 120/82 100 %   09/04/18 0852 - - - - 100 %   09/04/18 0845 - 98 15 133/83 100 %   09/04/18 0841 98 °F (36.7 °C) 88 16 (!) 129/91 100 %       EKG interpretation: (Preliminary) 8:37  Rhythm: normal sinus rhythm; and regular .  Rate (approx.): 87; Axis: normal; ID interval: normal; QRS interval: normal ; ST/T wave: non specific T wave abnormality. Written by JOHANA Delatorre, as dictated by Nohemy Mckeon MD.    Records Reviewed: Nursing Notes and Old Medical Records    Provider Notes (Medical Decision Making):   Differential includes atypical chest pain, stable angina, unstable angina, MI, PE, pleurisy, costochondritis, pneumonia, bronchitis, MSK pain. Do not suspect dissection. Disposition: Patient is a 46 yo female who presents to ED with reproducible right sided chest pain. EKG nonischemic. Troponin negative. Doubt PE given history, exam, therapeutic INR. Low suspicion for ACS. Suspect component of MSK pain. Will discharge with symptomatic management and cardiology follow up. ED Course:   Initial assessment performed. The patients presenting problems have been discussed, and they are in agreement with the care plan formulated and outlined with them. I have encouraged them to ask questions as they arise throughout their visit. PROGRESS NOTE:  10:00 AM  Troponin negative. Doubt ACS. Will discharge with cardiology follow up. Discussed results, prescriptions and follow up plan with patient. Provided customary return to ED instructions. Patient expressed understanding. Aniya Monroe MD    Disposition:  Discharge Note:  10:06 AM  The pt is ready for discharge. The pt's signs, symptoms, diagnosis, and discharge instructions have been discussed and pt has conveyed their understanding. The pt is to follow up as recommended or return to ER should their symptoms worsen. Plan has been discussed and pt is in agreement. PLAN:  1.   47 y.o F presents to ED with right-sided CP, reproducible on exam. EKG non-ischemic. Troponin negative. Do not suspect PE given hx, exam, and therapeutic INR.  Pain likely musculoskeletal. Will discharge with cardiology f/u.   2.   Follow-up Information     Follow up With Details Comments Contact Info    Jyoti Oneal MD In 2 days  [address]  [phone]      [facility] EMERGENCY DEPT  As needed, If symptoms worsen [address] [place] [address]  [phone]        Return to ED if worse     Diagnosis     Clinical Impression:   1. Atypical chest pain        Attestations: This note is prepared by Tai Cornelius, acting as a Scribe for MD Magda Carvajal MD: The scribe's documentation has been prepared under my direction and personally reviewed by me in its entirety. I confirm that the notes above accurately reflects all work, treatment, procedures, and medical decision making performed by me.

## 2018-09-10 NOTE — PROGRESS NOTES
[facility]  
[address] [place] [address], [address] [facility]. [address] NEW PATIENT HPI/FOLLOW-UP 
 
 
NAME:  Eleanor Sterling :   1965 MRN:   [id number] PCP:  No primary care provider on file. 
 
    
 
Subjective: The patient is a 47y.o. year old female with h/o DVT/PE 2018, and morbid obesity who presents to clinic as a new patient for evaluation of \"fluttering heart\" x several weeks. Pt reports occasional sensation of palpitations. Most often at nighttime when she is settling down to sleep. There is no chest pain or SOB, just an acute awareness of irregular heart beat. Pt is an over-the-road , who's primary residence is in [address] but works out of [place], [place] because \"the pay is better\". 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 She was admitted to the hospital in [facility] this past spring when she was overwhelmed by SOB. Her ER visit revealed PE and pt was admitted to the hospital and started on anticoagulation. Currently, she denies change in exercise tolerance, chest pain, edema, medication intolerance, shortness of breath, PND/orthopnea wheezing, sputum, syncope, dizziness or light headedness. She has severe arthritis in both knees and is unable to exercise or even walk without pain, so exertional symptoms are difficult to assess/elicit. Doing satisfactorily. Review of Systems General: Pt denies excessive weight gain or loss. Pt is able to conduct ADL's. Respiratory: Denies shortness of breath, URBINA, wheezing or stridor. Cardiovascular:+palpitations Denies precordial pain, edema or PND Gastrointestinal: Denies poor appetite, indigestion, abdominal pain or blood in stool Peripheral vascular: Denies claudication, leg cramps Neuropsychiatric: Denies paresthesias,tingling,numbness,anxiety,depression,fatigue Musculoskeletal: Denies pain,tenderness, soreness,swelling No past medical history on file. Patient Active Problem List  
 Diagnosis Date Noted  Fluttering heart 08/31/2018  Obesity, morbid ([address] 08/31/2018 No past surgical history on file. No Known Allergies No family history on file. Social History Social History  Marital status: SINGLE Spouse name: N/A  
 Number of children: N/A  
 Years of education: N/A Occupational History  Not on file. Social History Main Topics  Smoking status: Never Smoker  Smokeless tobacco: Never Used  Alcohol use Not on file  Drug use: Not on file  Sexual activity: Not on file Other Topics Concern  Not on file Social History Narrative  No narrative on file Current Outpatient Prescriptions Medication Sig  warfarin (COUMADIN) 5 mg tablet Take 5 mg by mouth daily.  Inulin-Sorbitol (FIBER SUPPLEMENT, INULIN,) 2 gram chew Take  by mouth daily.  OTHER DIGEST A ZYYME  
 traMADol (ULTRAM) 50 mg tablet Take 50 mg by mouth two (2) times a day.  hydroCHLOROthiazide (HYDRODIURIL) 12.5 mg tablet Take 1 Tab by mouth daily.  amLODIPine (NORVASC) 10 mg tablet Take 10 mg by mouth daily.  pantoprazole (PROTONIX) 40 mg tablet Take 40 mg by mouth daily.  medroxyPROGESTERone (PROVERA) 10 mg tablet Take 10 mg by mouth as needed. No current facility-administered medications for this visit. I have reviewed the nurses notes, vitals, problem list, allergy list, medical history, family medical, social history and medications. Objective:  
 
Physical Exam:  
 
Vitals:  
 08/31/18 7309 08/31/18 0940 BP: 120/76 118/70 Pulse: 86 Resp: 18 SpO2: 96% Weight: 239 lb (108.4 kg) Height: 5' (1.524 m) Body mass index is 46.68 kg/(m^2). General: WDWN obese woman, in no acute distress. Very pleasant affect. HEENT: No carotid bruits, no JVD, trach is midline. Heart:  Normal S1/S2 negative S3 or S4. Regular, no murmur, gallop or rub.  
Respiratory: Clear bilaterally, no wheezing or rales Abdomen:   Soft, non-tender, bowel sounds are active.  
Extremities:  1+ BLE edema, normal cap refill, no cyanosis. Neuro: A&Ox3, speech clear, gait stable. Skin: Skin color is normal. No rashes or lesions. No diaphoresis. Vascular: 2+ pulses symmetric in all extremities Data Review:  
 
 
Cardiographics: 
 
EKG interpretation: 
Sinus rhythm, HR 79 Low voltage QRS Single VPC Mild left atrial conduction delay EKG Interpreted by Dr. Ad Khan Cardiology Labs: 
 
No results found for this or any previous visit. No results found for: CHOL, CHOLX, CHLST, [address], [id number], HDL, LDL, LDLC, DLDLP, TGLX, TRIGL, TRIGP, CHHD, CHHDX No results found for: NA, K, CL, CO2, AGAP, GLU, BUN, CREA, BUCR, GFRAA, GFRNA, CA, TBIL, TBILI, GPT, SGOT, AP, TP, ALB, GLOB, AGRAT, ALT Assessment: ICD-10-CM ICD-9-CM 1. Fluttering heart I49.8 427.42 AMB POC EKG ROUTINE W/ 12 LEADS, INTER & REP 2. Obesity, morbid ([address] E66.01 278.01   
3. Personal history of DVT (deep vein thrombosis) Z86.718 V12.51   
4. Other pulmonary embolism without acute cor pulmonale, unspecified chronicity (HCC) I26.99 415.19 Discussion: Patient presents at this time stable from a cardiac perspective. BP was well controlled. Pleased with present status. Discussed/seen with Dr. Ad Khan Plan: 1. Continue same meds. -- Event monitor and Echo 2. Encouraged to exercise to tolerance, lose weight, stop smoking and follow low fat, low cholesterol, low sodium predominantly Plant-based (consider Mediterranean) diet. 3.Follow up: 6 weeks --  Call with questions or concerns. -- Will follow up any test results by phone and/or f/u here in office if needed. Violeta Foote I have discussed the diagnosis with the patient and the intended plan as seen in the above orders. The patient has received an after-visit summary and questions were answered concerning future plans. I have discussed any concerning medication side effects and warnings with the patient as well. Lenora Aguilar PA-C 
9/10/2018

## 2018-09-21 ENCOUNTER — HOSPITAL ENCOUNTER (OUTPATIENT)
Dept: NON INVASIVE DIAGNOSTICS | Age: 53
Discharge: HOME | End: 2018-09-21
Attending: PHYSICIAN ASSISTANT
Payer: COMMERCIAL

## 2018-09-21 DIAGNOSIS — I49.8 FLUTTERING HEART: ICD-10-CM

## 2018-09-21 DIAGNOSIS — I26.99 OTHER PULMONARY EMBOLISM WITHOUT ACUTE COR PULMONALE, UNSPECIFIED CHRONICITY (HCC): ICD-10-CM

## 2018-09-21 PROCEDURE — 93306 TTE W/DOPPLER COMPLETE: CPT

## 2018-09-21 PROCEDURE — 93270 REMOTE 30 DAY ECG REV/REPORT: CPT

## 2018-09-21 NOTE — PROGRESS NOTES
30 day event monitor placed with instructions noted return verbal understanding of monitor from pt. Extra supplies given.

## 2018-10-15 ENCOUNTER — HOSPITAL ENCOUNTER (EMERGENCY)
Age: 53
Discharge: HOME | End: 2018-10-15
Attending: EMERGENCY MEDICINE
Payer: COMMERCIAL

## 2018-10-15 ENCOUNTER — APPOINTMENT (OUTPATIENT)
Dept: GENERAL RADIOLOGY | Age: 53
End: 2018-10-15
Attending: EMERGENCY MEDICINE
Payer: COMMERCIAL

## 2018-10-15 VITALS
DIASTOLIC BLOOD PRESSURE: 95 MMHG | SYSTOLIC BLOOD PRESSURE: 134 MMHG | TEMPERATURE: 97.7 F | BODY MASS INDEX: 46.13 KG/M2 | RESPIRATION RATE: 14 BRPM | OXYGEN SATURATION: 100 % | WEIGHT: 235 LBS | HEART RATE: 88 BPM | HEIGHT: 60 IN

## 2018-10-15 DIAGNOSIS — R79.1 ELEVATED INR: ICD-10-CM

## 2018-10-15 DIAGNOSIS — R07.89 CHEST WALL PAIN: Primary | ICD-10-CM

## 2018-10-15 LAB
ALBUMIN SERPL-MCNC: 3.7 G/DL (ref 3.5–5)
ALBUMIN/GLOB SERPL: 0.8 {RATIO} (ref 1.1–2.2)
ALP SERPL-CCNC: 51 U/L (ref 45–117)
ALT SERPL-CCNC: 14 U/L (ref 12–78)
ANION GAP SERPL CALC-SCNC: 5 MMOL/L (ref 5–15)
AST SERPL-CCNC: 21 U/L (ref 15–37)
BASOPHILS # BLD: 0 K/UL (ref 0–0.1)
BASOPHILS NFR BLD: 0 % (ref 0–1)
BILIRUB SERPL-MCNC: 1.3 MG/DL (ref 0.2–1)
BUN SERPL-MCNC: 16 MG/DL (ref 6–20)
BUN/CREAT SERPL: 16 (ref 12–20)
CALCIUM SERPL-MCNC: 8.6 MG/DL (ref 8.5–10.1)
CHLORIDE SERPL-SCNC: 105 MMOL/L (ref 97–108)
CO2 SERPL-SCNC: 28 MMOL/L (ref 21–32)
COMMENT, HOLDF: NORMAL
CREAT SERPL-MCNC: 1.03 MG/DL (ref 0.55–1.02)
DIFFERENTIAL METHOD BLD: NORMAL
EOSINOPHIL # BLD: 0 K/UL (ref 0–0.4)
EOSINOPHIL NFR BLD: 1 % (ref 0–7)
ERYTHROCYTE [DISTWIDTH] IN BLOOD BY AUTOMATED COUNT: 13.1 % (ref 11.5–14.5)
GLOBULIN SER CALC-MCNC: 4.6 G/DL (ref 2–4)
GLUCOSE SERPL-MCNC: 94 MG/DL (ref 65–100)
HCT VFR BLD AUTO: 37.4 % (ref 35–47)
HGB BLD-MCNC: 12.8 G/DL (ref 11.5–16)
IMM GRANULOCYTES # BLD: 0 K/UL (ref 0–0.04)
IMM GRANULOCYTES NFR BLD AUTO: 0 % (ref 0–0.5)
INR BLD: 4.1 (ref 0.9–1.2)
LYMPHOCYTES # BLD: 1.6 K/UL (ref 0.8–3.5)
LYMPHOCYTES NFR BLD: 21 % (ref 12–49)
MCH RBC QN AUTO: 29.8 PG (ref 26–34)
MCHC RBC AUTO-ENTMCNC: 34.2 G/DL (ref 30–36.5)
MCV RBC AUTO: 87.2 FL (ref 80–99)
MONOCYTES # BLD: 0.6 K/UL (ref 0–1)
MONOCYTES NFR BLD: 8 % (ref 5–13)
NEUTS SEG # BLD: 5.5 K/UL (ref 1.8–8)
NEUTS SEG NFR BLD: 70 % (ref 32–75)
NRBC # BLD: 0 K/UL (ref 0–0.01)
NRBC BLD-RTO: 0 PER 100 WBC
PLATELET # BLD AUTO: 285 K/UL (ref 150–400)
PMV BLD AUTO: 9.4 FL (ref 8.9–12.9)
POTASSIUM SERPL-SCNC: 3.8 MMOL/L (ref 3.5–5.1)
PROT SERPL-MCNC: 8.3 G/DL (ref 6.4–8.2)
RBC # BLD AUTO: 4.29 M/UL (ref 3.8–5.2)
SAMPLES BEING HELD,HOLD: NORMAL
SODIUM SERPL-SCNC: 138 MMOL/L (ref 136–145)
TROPONIN I SERPL-MCNC: <0.05 NG/ML
WBC # BLD AUTO: 7.8 K/UL (ref 3.6–11)

## 2018-10-15 PROCEDURE — 74011250636 HC RX REV CODE- 250/636: Performed by: EMERGENCY MEDICINE

## 2018-10-15 PROCEDURE — 93005 ELECTROCARDIOGRAM TRACING: CPT

## 2018-10-15 PROCEDURE — 36415 COLL VENOUS BLD VENIPUNCTURE: CPT | Performed by: EMERGENCY MEDICINE

## 2018-10-15 PROCEDURE — 71045 X-RAY EXAM CHEST 1 VIEW: CPT

## 2018-10-15 PROCEDURE — 84484 ASSAY OF TROPONIN QUANT: CPT | Performed by: EMERGENCY MEDICINE

## 2018-10-15 PROCEDURE — 85610 PROTHROMBIN TIME: CPT

## 2018-10-15 PROCEDURE — 85025 COMPLETE CBC W/AUTO DIFF WBC: CPT | Performed by: EMERGENCY MEDICINE

## 2018-10-15 PROCEDURE — 74011000250 HC RX REV CODE- 250: Performed by: EMERGENCY MEDICINE

## 2018-10-15 PROCEDURE — 99284 EMERGENCY DEPT VISIT MOD MDM: CPT

## 2018-10-15 PROCEDURE — 80053 COMPREHEN METABOLIC PANEL: CPT | Performed by: EMERGENCY MEDICINE

## 2018-10-15 PROCEDURE — 96374 THER/PROPH/DIAG INJ IV PUSH: CPT

## 2018-10-15 PROCEDURE — 74011250637 HC RX REV CODE- 250/637: Performed by: EMERGENCY MEDICINE

## 2018-10-15 RX ORDER — TRAMADOL HYDROCHLORIDE 50 MG/1
50 TABLET ORAL 2 TIMES DAILY
Qty: 15 TAB | Refills: 0 | Status: SHIPPED | OUTPATIENT
Start: 2018-10-15 | End: 2018-11-19 | Stop reason: ALTCHOICE

## 2018-10-15 RX ORDER — FAMOTIDINE 10 MG/ML
20 INJECTION INTRAVENOUS
Status: COMPLETED | OUTPATIENT
Start: 2018-10-15 | End: 2018-10-15

## 2018-10-15 RX ADMIN — LIDOCAINE HYDROCHLORIDE 40 ML: 20 SOLUTION ORAL; TOPICAL at 17:41

## 2018-10-15 RX ADMIN — FAMOTIDINE 20 MG: 10 INJECTION, SOLUTION INTRAVENOUS at 17:41

## 2018-10-15 NOTE — ED PROVIDER NOTES
EMERGENCY DEPARTMENT HISTORY AND PHYSICAL EXAM 
 
 
Date: 10/15/2018 Patient Name: Chapin Aquino History of Presenting Illness Chief Complaint Patient presents with  Chest Pain Right sided chest pain with increased burping. Patient wearing a holter monitor for palpitations. Denies SOB History Provided By: Patient HPI: Chapin Aquino, 46 y.o. female with PMHx significant for HTN, thromboembolus, PE, presents ambulatory to the ED with cc of moderate, constant R sided chest pain with associated belching today. Pt reports she is concerned of blood clots in lungs, due to hx of DVT. She mentions symptoms is exacerbated with physical exertion but denies any alleviating factors. Pt is currently endorsing warfarin for DVT and conveys compliance of medication regimen. Pt is currently wearing a Holter monitor due to heart palpitations. Per chart review, pt was seen in ED 09/04/18 for atypical CP with f/u by cardiologist. Pt denies endorsing any medication to relieve current symptoms. Pt specifically denies any signs of fever, chills, N/V/D, dysuria, constipation, hematuria, abdominal pain, back pain, cough, SOB, and any other associated symptoms. There are no other complaints, changes, or physical findings at this time. PCP: Ashley Najera MD 
 
Current Outpatient Prescriptions Medication Sig Dispense Refill  traMADol (ULTRAM) 50 mg tablet Take 1 Tab by mouth two (2) times a day. Max Daily Amount: 100 mg. 15 Tab 0  
 warfarin (COUMADIN) 5 mg tablet Take 5 mg by mouth daily.  Inulin-Sorbitol (FIBER SUPPLEMENT, INULIN,) 2 gram chew Take  by mouth daily.  OTHER DIGEST A ZYYME    
 hydroCHLOROthiazide (HYDRODIURIL) 12.5 mg tablet Take 1 Tab by mouth daily.  amLODIPine (NORVASC) 10 mg tablet Take 10 mg by mouth daily.  pantoprazole (PROTONIX) 40 mg tablet Take 40 mg by mouth daily.  medroxyPROGESTERone (PROVERA) 10 mg tablet Take 10 mg by mouth as needed.  amLODIPine (NORVASC) 10 mg tablet Take 10 mg by mouth daily.  hydroCHLOROthiazide (HYDRODIURIL) 12.5 mg tablet Take 12.5 mg by mouth daily.  ferrous sulfate 325 mg (65 mg iron) tablet Take 325 mg by mouth two (2) times a day.  warfarin (COUMADIN) 5 mg tablet Take 5 mg by mouth daily. Mon and FRI take 1 and 1/2 pills (7.5 mg); and all other days take 2 pills (10mg) Past History Past Medical History: 
Past Medical History:  
Diagnosis Date  Hypertension  Thromboembolus ([address] \"and PEs\" Past Surgical History: 
Past Surgical History:  
Procedure Laterality Date  HX ORTHOPAEDIC    
 right hand Family History: No family history on file. Social History: 
Social History Substance Use Topics  Smoking status: Never Smoker  Smokeless tobacco: Never Used  Alcohol use Yes Comment: occ Allergies: 
No Known Allergies Review of Systems Review of Systems Constitutional: Negative. Negative for chills and fever. HENT: Negative. Negative for congestion and rhinorrhea. Respiratory: Negative. Negative for cough, chest tightness and wheezing. Cardiovascular: Positive for chest pain. Negative for palpitations. Gastrointestinal: Negative. Negative for abdominal pain, constipation, nausea and vomiting. Endocrine: Negative. Genitourinary: Negative. Negative for decreased urine volume, flank pain, hematuria and pelvic pain. +belching Musculoskeletal: Negative. Negative for back pain and neck pain. Skin: Negative. Negative for color change, pallor and rash. Neurological: Negative. Negative for dizziness, seizures, weakness, numbness and headaches. Hematological: Negative. Negative for adenopathy. Psychiatric/Behavioral: Negative. All other systems reviewed and are negative. Physical Exam  
Physical Exam  
Constitutional: She is oriented to person, place, and time.  She appears well-developed and well-nourished. No distress. HENT:  
Head: Normocephalic and atraumatic. Mouth/Throat: No oropharyngeal exudate. Eyes: Conjunctivae are normal. Pupils are equal, round, and reactive to light. Right eye exhibits no discharge. Left eye exhibits no discharge. No scleral icterus. Neck: Normal range of motion. Neck supple. No JVD present. Cardiovascular: Normal rate, regular rhythm, normal heart sounds and intact distal pulses. Exam reveals no gallop and no friction rub. No murmur heard. Pulmonary/Chest: Effort normal and breath sounds normal. No stridor. No respiratory distress. She has no wheezes. She has no rales. She exhibits tenderness. She exhibits no laceration, no crepitus, no edema and no swelling. Right breast exhibits no mass. Left breast exhibits no mass. Abdominal: Soft. Bowel sounds are normal. She exhibits no distension and no mass. There is no tenderness. There is no rebound and no guarding. Neurological: She is alert and oriented to person, place, and time. She displays normal reflexes. No cranial nerve deficit. She exhibits normal muscle tone. Coordination normal.  
Skin: Skin is warm. No rash noted. She is not diaphoretic. No pallor. Vitals reviewed. Diagnostic Study Results Labs - Recent Results (from the past 12 hour(s)) EKG, 12 LEAD, INITIAL Collection Time: 10/15/18  4:15 PM  
Result Value Ref Range Ventricular Rate 91 BPM  
 Atrial Rate 91 BPM  
 P-R Interval 126 ms  
 QRS Duration 88 ms Q-T Interval 370 ms QTC Calculation (Bezet) 455 ms Calculated P Axis 63 degrees Calculated R Axis 22 degrees Calculated T Axis 53 degrees Diagnosis Sinus rhythm with premature atrial complexes Possible Left atrial enlargement Low voltage QRS When compared with ECG of 04-SEP-2018 08:37, 
premature atrial complexes are now present CBC WITH AUTOMATED DIFF Collection Time: 10/15/18  5:39 PM  
Result Value Ref Range WBC 7.8 3.6 - 11.0 K/uL  
 RBC 4.29 3.80 - 5.20 M/uL  
 HGB 12.8 11.5 - 16.0 g/dL HCT 37.4 35.0 - 47.0 % MCV 87.2 80.0 - 99.0 FL  
 MCH 29.8 26.0 - 34.0 PG  
 MCHC 34.2 30.0 - 36.5 g/dL  
 RDW 13.1 11.5 - 14.5 % PLATELET 400 673 - 092 K/uL MPV 9.4 8.9 - 12.9 FL  
 NRBC 0.0 0  WBC ABSOLUTE NRBC 0.00 0.00 - 0.01 K/uL NEUTROPHILS 70 32 - 75 % LYMPHOCYTES 21 12 - 49 % MONOCYTES 8 5 - 13 % EOSINOPHILS 1 0 - 7 % BASOPHILS 0 0 - 1 % IMMATURE GRANULOCYTES 0 0.0 - 0.5 % ABS. NEUTROPHILS 5.5 1.8 - 8.0 K/UL  
 ABS. LYMPHOCYTES 1.6 0.8 - 3.5 K/UL  
 ABS. MONOCYTES 0.6 0.0 - 1.0 K/UL  
 ABS. EOSINOPHILS 0.0 0.0 - 0.4 K/UL  
 ABS. BASOPHILS 0.0 0.0 - 0.1 K/UL  
 ABS. IMM. GRANS. 0.0 0.00 - 0.04 K/UL  
 DF AUTOMATED METABOLIC PANEL, COMPREHENSIVE Collection Time: 10/15/18  5:39 PM  
Result Value Ref Range Sodium 138 136 - 145 mmol/L Potassium 3.8 3.5 - 5.1 mmol/L Chloride 105 97 - 108 mmol/L  
 CO2 28 21 - 32 mmol/L Anion gap 5 5 - 15 mmol/L Glucose 94 65 - 100 mg/dL BUN 16 6 - 20 MG/DL Creatinine 1.03 (H) 0.55 - 1.02 MG/DL  
 BUN/Creatinine ratio 16 12 - 20 GFR est AA >60 >60 ml/min/1.73m2 GFR est non-AA 56 (L) >60 ml/min/1.73m2 Calcium 8.6 8.5 - 10.1 MG/DL Bilirubin, total 1.3 (H) 0.2 - 1.0 MG/DL  
 ALT (SGPT) 14 12 - 78 U/L  
 AST (SGOT) 21 15 - 37 U/L Alk. phosphatase 51 45 - 117 U/L Protein, total 8.3 (H) 6.4 - 8.2 g/dL Albumin 3.7 3.5 - 5.0 g/dL Globulin 4.6 (H) 2.0 - 4.0 g/dL A-G Ratio 0.8 (L) 1.1 - 2.2    
TROPONIN I Collection Time: 10/15/18  5:39 PM  
Result Value Ref Range Troponin-I, Qt. <0.05 <0.05 ng/mL SAMPLES BEING HELD Collection Time: 10/15/18  5:39 PM  
Result Value Ref Range SAMPLES BEING HELD BLUE TOP   
 COMMENT Add-on orders for these samples will be processed based on acceptable specimen integrity and analyte stability, which may vary by analyte.   
POC INR  
 Collection Time: 10/15/18  7:56 PM  
Result Value Ref Range INR (POC) 4.1 (H) <1.2 Radiologic Studies - CXR Results  (Last 48 hours) 10/15/18 1850  XR CHEST PORT Final result Impression:  IMPRESSION: No acute findings. Narrative:  EXAM:  XR CHEST PORT INDICATION:  Chest pain COMPARISON:  9/4/2018 FINDINGS: A portable AP radiograph of the chest was obtained at 1838 hours. There is no pneumothorax or pleural effusion. The lungs are clear. Cardiac,  
mediastinal and hilar contours remain normal.  The bones and soft tissues are  
grossly within normal limits. Medical Decision Making I am the first provider for this patient. I reviewed the vital signs, available nursing notes, past medical history, past surgical history, family history and social history. Vital Signs-Reviewed the patient's vital signs. Patient Vitals for the past 12 hrs: 
 Temp Pulse Resp BP SpO2  
10/15/18 1610 97.7 °F (36.5 °C) 89 14 (!) 126/92 100 % Pulse Oximetry Analysis - 100% on RA Cardiac Monitor:  
Rate: 91 bpm 
Rhythm: Normal Sinus Rhythm EKG interpretation: (Preliminary) 16:15 Rhythm: normal sinus rhythm; and regular . Rate (approx.): 91; Axis: normal; FL interval: normal; QRS interval: normal ; ST/T wave: normal; Other findings: No other findings. Written by JOHANA Melo, as dictated by Xavier Cummings MD. Records Reviewed: Nursing Notes, Old Medical Records, Previous Radiology Studies and Previous Laboratory Studies Provider Notes (Medical Decision Making): DDx: chest wall pain, PE, GERD, hiatal; hernia, biliary colic, CAD Impression Plan: Hx of thromboembolus disease on warfarin to the ER with repoducible R sided chest wall pain. Symptoms not consistent with PE and INR is 4.1 encourage pt to f/u with PCP and will hold Coumadin for one day. ED Course: Initial assessment performed. The patients presenting problems have been discussed, and they are in agreement with the care plan formulated and outlined with them. I have encouraged them to ask questions as they arise throughout their visit. Critical Care Time:  
0 minutes Disposition: 
Discharge Note: 
8:13 PM 
The pt is ready for discharge. The pt's signs, symptoms, diagnosis, and discharge instructions have been discussed and pt has conveyed their understanding. The pt is to follow up as recommended or return to ER should their symptoms worsen. Plan has been discussed and pt is in agreement. PLAN: 
1. Current Discharge Medication List  
  
CONTINUE these medications which have CHANGED Details  
traMADol (ULTRAM) 50 mg tablet Take 1 Tab by mouth two (2) times a day. Max Daily Amount: 100 mg. Qty: 15 Tab, Refills: 0 Associated Diagnoses: Chest wall pain 2. Follow-up Information Follow up With Details Comments Contact Info Néstor Lauren MD Schedule an appointment as soon as possible for a visit in 1 day  [address] [facility] 
[phone] [facility] EMERGENCY DEPT  If symptoms worsen [address] 
[phone] Return to ED if worse Diagnosis Clinical Impression: 1. Chest wall pain 2. Elevated INR Attestations: This note is prepared by Dali Martinez, acting as Scribe for Loc Calix MD. Loc Calix MD: The scribe's documentation has been prepared under my direction and personally reviewed by me in its entirety. I confirm that the note above accurately reflects all work, treatment, procedures, and medical decision making performed by me

## 2018-10-16 LAB
ATRIAL RATE: 91 BPM
CALCULATED P AXIS, ECG09: 63 DEGREES
CALCULATED R AXIS, ECG10: 22 DEGREES
CALCULATED T AXIS, ECG11: 53 DEGREES
DIAGNOSIS, 93000: NORMAL
P-R INTERVAL, ECG05: 126 MS
Q-T INTERVAL, ECG07: 370 MS
QRS DURATION, ECG06: 88 MS
QTC CALCULATION (BEZET), ECG08: 455 MS
VENTRICULAR RATE, ECG03: 91 BPM

## 2018-10-16 NOTE — ED NOTES
Pt discharged by Dr. Elvi Bravo. Pt provided with discharge instructions Rx and instructions on follow up care. Pt out of ED in stable condition accompanied by family/ed Tech via wheelchair.

## 2018-10-16 NOTE — DISCHARGE INSTRUCTIONS
Chest Pain: Care Instructions  Your Care Instructions    There are many things that can cause chest pain. Some are not serious and will get better on their own in a few days. But some kinds of chest pain need more testing and treatment. Your doctor may have recommended a follow-up visit in the next 8 to 12 hours. If you are not getting better, you may need more tests or treatment. Even though your doctor has released you, you still need to watch for any problems. The doctor carefully checked you, but sometimes problems can develop later. If you have new symptoms or if your symptoms do not get better, get medical care right away. If you have worse or different chest pain or pressure that lasts more than 5 minutes or you passed out (lost consciousness), call 911 or seek other emergency help right away. A medical visit is only one step in your treatment. Even if you feel better, you still need to do what your doctor recommends, such as going to all suggested follow-up appointments and taking medicines exactly as directed. This will help you recover and help prevent future problems. How can you care for yourself at home? · Rest until you feel better. · Take your medicine exactly as prescribed. Call your doctor if you think you are having a problem with your medicine. · Do not drive after taking a prescription pain medicine. When should you call for help? Call 911 if:    · You passed out (lost consciousness).     · You have severe difficulty breathing.     · You have symptoms of a heart attack. These may include:  ¨ Chest pain or pressure, or a strange feeling in your chest.  ¨ Sweating. ¨ Shortness of breath. ¨ Nausea or vomiting. ¨ Pain, pressure, or a strange feeling in your back, neck, jaw, or upper belly or in one or both shoulders or arms. ¨ Lightheadedness or sudden weakness. ¨ A fast or irregular heartbeat.   After you call 911, the  may tell you to chew 1 adult-strength or 2 to 4 low-dose aspirin. Wait for an ambulance. Do not try to drive yourself.    Call your doctor today if:    · You have any trouble breathing.     · Your chest pain gets worse.     · You are dizzy or lightheaded, or you feel like you may faint.     · You are not getting better as expected.     · You are having new or different chest pain. Where can you learn more? Go to http://moses-montserrat.info/. Enter A120 in the search box to learn more about \"Chest Pain: Care Instructions. \"  Current as of: 2017  Content Version: 11.8  © 4994-4237 [employer]. Care instructions adapted under license by [employer] (which disclaims liability or warranty for this information). If you have questions about a medical condition or this instruction, always ask your healthcare professional. [address] any warranty or liability for your use of this information. [facility] Activation    Thank you for requesting access to [facility]. Please follow the instructions below to securely access and download your online medical record. [facility] allows you to send messages to your doctor, view your test results, renew your prescriptions, schedule appointments, and more. How Do I Sign Up? 1. In your internet browser, go to [email or web address]  2. Click on the First Time User? Click Here link in the Sign In box. You will be redirect to the New Member Sign Up page. 3. Enter your [facility] Access Code exactly as it appears below. You will not need to use this code after youve completed the sign-up process. If you do not sign up before the expiration date, you must request a new code. [facility] Access Code: [email or web address]  Expires: 2018 10:01 AM (This is the date your [facility] access code will )    4. Enter the last four digits of your Social Security Number (xxxx) and Date of Birth (mm/dd/yyyy) as indicated and click Submit.  You will be taken to the next sign-up page. 5. Create a adQt ID. This will be your TRA login ID and cannot be changed, so think of one that is secure and easy to remember. 6. Create a TRA password. You can change your password at any time. 7. Enter your Password Reset Question and Answer. This can be used at a later time if you forget your password. 8. Enter your e-mail address. You will receive e-mail notification when new information is available in [address]. 9. Click Sign Up. You can now view and download portions of your medical record. 10. Click the Download Summary menu link to download a portable copy of your medical information. Additional Information    If you have questions, please visit the Frequently Asked Questions section of the TRA website at [email or web address] Remember, TRA is NOT to be used for urgent needs. For medical emergencies, dial 911.

## 2018-11-14 PROBLEM — I48.91 ATRIAL FIBRILLATION (HCC): Status: ACTIVE | Noted: 2018-11-14

## 2018-11-14 NOTE — PROGRESS NOTES
Pt's heart monitor showed a brief episode of atrial fibrillation with rapid rate; this is probably the sensation of \"fluttering heart\" she is experiencing. Please advise her that she should remain on the warfarin that she is already taking for recent DVT/PE. Let's make her an appointment for when she is back in town to discuss options for the A-fib.

## 2018-11-16 ENCOUNTER — TELEPHONE (OUTPATIENT)
Dept: CARDIOLOGY CLINIC | Age: 53
End: 2018-11-16

## 2018-11-16 NOTE — TELEPHONE ENCOUNTER
The patient returned my call. I informed her that the PA had sent me a message. Pt's heart monitor showed a brief episode of atrial fibrillation with rapid rate; this is probably the sensation of \"fluttering heart\" she is experiencing. Please advise her that she should remain on the warfarin that she is already taking for recent DVT/PE. Let's make her an appointment for when she is back in town to discuss options for the A-fib. I told her I will have the PSR call you to schedule a follow up. She acknowledged understanding and thanked me for the call.

## 2018-11-16 NOTE — TELEPHONE ENCOUNTER
----- Message from Alfredo Whitley PA-C sent at 11/14/2018  4:05 PM EST -----  Pt's heart monitor showed a brief episode of atrial fibrillation with rapid rate; this is probably the sensation of \"fluttering heart\" she is experiencing. Please advise her that she should remain on the warfarin that she is already taking for recent DVT/PE. Let's make her an appointment for when she is back in town to discuss options for the A-fib.

## 2018-11-19 ENCOUNTER — OFFICE VISIT (OUTPATIENT)
Dept: CARDIOLOGY CLINIC | Age: 53
End: 2018-11-19

## 2018-11-19 VITALS
HEART RATE: 78 BPM | OXYGEN SATURATION: 99 % | DIASTOLIC BLOOD PRESSURE: 90 MMHG | SYSTOLIC BLOOD PRESSURE: 129 MMHG | RESPIRATION RATE: 18 BRPM | BODY MASS INDEX: 48.88 KG/M2 | HEIGHT: 60 IN | WEIGHT: 249 LBS

## 2018-11-19 DIAGNOSIS — E66.01 OBESITY, MORBID (HCC): ICD-10-CM

## 2018-11-19 DIAGNOSIS — I49.8 FLUTTERING HEART: Primary | ICD-10-CM

## 2018-11-19 DIAGNOSIS — R60.0 EDEMA LEG: ICD-10-CM

## 2018-11-19 DIAGNOSIS — I51.7 LVH (LEFT VENTRICULAR HYPERTROPHY): ICD-10-CM

## 2018-11-19 DIAGNOSIS — I10 ESSENTIAL HYPERTENSION: ICD-10-CM

## 2018-11-19 RX ORDER — SPIRONOLACTONE 25 MG/1
25 TABLET ORAL DAILY
Qty: 30 TAB | Refills: 6 | Status: SHIPPED | OUTPATIENT
Start: 2018-11-19 | End: 2019-07-17 | Stop reason: SDUPTHER

## 2018-11-19 RX ORDER — METOPROLOL SUCCINATE 50 MG/1
50 TABLET, EXTENDED RELEASE ORAL DAILY
Qty: 30 TAB | Refills: 6 | Status: SHIPPED | OUTPATIENT
Start: 2018-11-19 | End: 2019-07-17 | Stop reason: SDUPTHER

## 2018-11-19 NOTE — PROGRESS NOTES
Jayesh Melissa is a 46year old lady with hypertension and hypertensive heart disease. She has a recent prior history of DVT and PTE. She was originally referred to us for assessment of palpitations and subjective rapid heart rates. We had referred her for cardiac event monitor recording andf echocardiography. These tests were done and are summarized below. Since the last OV she had an ED visit for left sided chest pain with negative workup. It was justified because of previous DVT and ?PE. 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 She complains of constant LE swelling and limb pain at rest and with compression of the anterior tibial area on both sides, and of anterior tibial pain on dorsiflexion. She denies posterior calf pain and ambulatory claudication. She has a problem controlling caloric intake and dietary sodium. 8 oz of wine and 12 oz soda every night      Last INR was 4.0 in the ED. Dr. Sindy Leso did one after that and didn't change her dose. She takes 10 mg 5 days a week and 7.5 mg two days a week. She is also taking amlodipine and complaining of swelling. She has pain in anterior tibial areas and in posterior thighs.      Event recorder:    Jayesh Melissa is a 16-year-old female with a complaint of almost constant palpitations.  She underwent patient triggered cardiac event recording between September 21, 2018 and October 20, 2018.  During that time she submitted 150 different symptom events with EKG recordings.  Her baseline rhythm is sinus with normal AV conduction.  She has occasional simple ventricular premature contractions with compensatory pause and without complex forms.  There were no bursts or runs other than an occasional dimorphic couplet with immediate return to sinus rhythm.  Symptoms of dizziness, lightheadedness, chest pain, and shortness of breath all correspond to mild sinus tachycardia with simple unifocal ventricular ectopy. Deborah Shaver is one burst of paroxysmal atrial fibrillation lasting for 6 beats on October 16.  This was an automated recording with no symptoms.  Otherwise there are occasional normal conduction APCs but they are actually quite rare. Impression: The patient has reasonably frequent unifocal VPCs with no complex forms.  She had one bout of asymptomatic brief atrial fibrillation with rapid ventricular response.  There does not appear to be any correspondence between EKG changes and symptoms.  There are no ST segment changes. Echo:    SUMMARY:  Left ventricle: Systolic function was mildly reduced by visual assessment. Ejection fraction was estimated to be 45 % to 50 %. There were no regional  wall motion abnormalities. Doppler parameters were consistent with a  reversible restrictive pattern, indicative of decreased left ventricular  diastolic compliance and/or increased left atrial pressure (grade 3  diastolic dysfunction). Atrial septum: The septum was thickened. Tricuspid valve: There was mild regurgitation. Regurgitation grade was 1+  on a scale of 0 to 4+. Aorta, systemic arteries: The root exhibited upper limit of normal size  and mild fibrocalcific change. Inferior vena cava, hepatic veins: Respirophasic changes were blunted  (less than 50% variation). On exam, she is an overweight female appearing a bit older than stated age. She has severe  4+ bilateral leg edema with calf tendernes, low grade anterior tibial cellulitis, mild pain on dorsiflexion, no posterior calf tenderness or palpable cord. BP is 129/90. Cardiac auscultation is normal with regular rhythm, no gallop, soft systolic murmur varying with respiration along LSB. Lungs are clear. Abdominal exam was impossible in the chair but there is no tenderness. 12 lead EKG was not repeated today but last month is showed sinus tachycardia with low voltage QRS, normal except for some APC's.      Impression: Multifactorial edema     Obesity and sedentary life     Amlodipine     Sodium in diet     Pulmonary hypertension      PTE      Noncompliant LVH    Plan:  Continue anticoagulant    Discontinue amlodipine    Discontinue HCTZ     Diminish secondary aldosteronism and angiotensinism    Start Toprol XL    Start spironolactone    2.0 gm Na diet    Reassure about benign arrhythmias; should be suppressed by Toprol    Extensive education of patient and friend about these concepts; about 25 minutes    Sarah Pompa MD [facility]

## 2018-11-19 NOTE — PROGRESS NOTES
Chief Complaint   Patient presents with    Irregular Heart Beat     S/P ED 10/15/18 Chest pain, Echo September, Event Monitor 11/6/18     1. Have you been to the ER, urgent care clinic since your last visit? Hospitalized since your last visit? No    2. Have you seen or consulted any other health care providers outside of the [facility] since your last visit? Include any pap smears or colon screening.  No

## 2018-11-19 NOTE — PATIENT INSTRUCTIONS
As we discussed, I think a lot of your swelling is coming from the combination of AMLODIPINE  (it makes the veins leak water in to the tissues) and salt intake. The leg cramps may be related to electrolyte imbalances coming from the HYDROCHLOROTHIAZIDE. The HYDROCHLOROTHIAZIDE also triggers two hormones more commonly in  Americans than others, namely ALDOSTERONE and ANGIOTENSIN. The first one fights against salt loss and wastes potassium at the same time. The ANFIOTENSIN makes the heart muscle get stiff and tense and less able to tolerate fast pulse rates without distress. I'd like to stop the AMLODIPINE to help get rid of the swelling. I'd like to switch to METOPROLOL (TOPROL) to help slow your heart rate down and soften the muscle a bit; both of these effects will make you feel more comfortable and you should have fewer palpitations. As far as a fluid pill is concerned, I'd like to get you away from the HYDROCHLOROTHIAZIDE and change to SPIRONOLACTONE. This will prevent the excess production of the two kidney hormones I named above and also help get rid of the swelling. Please try to stay below 2000 mg of SODIUM per day. I think you can return to the care of Dr. Vicie Duane at this point, and I will report to her in detail.

## 2018-12-19 ENCOUNTER — HOSPITAL ENCOUNTER (OUTPATIENT)
Dept: MAMMOGRAPHY | Age: 53
Discharge: HOME | End: 2018-12-19
Attending: FAMILY MEDICINE
Payer: COMMERCIAL

## 2018-12-19 DIAGNOSIS — Z12.39 SCREENING BREAST EXAMINATION: ICD-10-CM

## 2018-12-19 PROCEDURE — 77067 SCR MAMMO BI INCL CAD: CPT

## 2019-07-21 RX ORDER — METOPROLOL SUCCINATE 50 MG/1
TABLET, EXTENDED RELEASE ORAL
Qty: 30 TAB | Refills: 6 | Status: SHIPPED | OUTPATIENT
Start: 2019-07-21 | End: 2020-03-09 | Stop reason: SDUPTHER

## 2019-07-21 RX ORDER — SPIRONOLACTONE 25 MG/1
TABLET ORAL
Qty: 30 TAB | Refills: 6 | Status: SHIPPED | OUTPATIENT
Start: 2019-07-21 | End: 2020-03-09 | Stop reason: SDUPTHER

## 2019-08-01 ENCOUNTER — OFFICE VISIT (OUTPATIENT)
Dept: CARDIOLOGY CLINIC | Age: 54
End: 2019-08-01

## 2019-08-01 VITALS
SYSTOLIC BLOOD PRESSURE: 114 MMHG | RESPIRATION RATE: 16 BRPM | WEIGHT: 247 LBS | BODY MASS INDEX: 48.49 KG/M2 | OXYGEN SATURATION: 99 % | HEART RATE: 77 BPM | HEIGHT: 60 IN | DIASTOLIC BLOOD PRESSURE: 90 MMHG

## 2019-08-01 DIAGNOSIS — I48.91 ATRIAL FIBRILLATION, UNSPECIFIED TYPE (HCC): Primary | ICD-10-CM

## 2019-08-01 RX ORDER — RIVAROXABAN 20 MG/1
TABLET, FILM COATED ORAL
Refills: 11 | COMMUNITY
Start: 2019-06-06 | End: 2020-11-06 | Stop reason: SDUPTHER

## 2019-08-01 NOTE — LETTER
8/29/19 Patient: Buddy Mendez YOB: 1965 Date of Visit: 8/1/2019 Tom Muñiz MD 
[address] [facility] [address] VIA Facsimile: [phone] Dear Tom Muñiz MD, Thank you for referring Ms. Priyank Mosquera to [address] [place] [address] for evaluation. My notes for this consultation are attached. If you have questions, please do not hesitate to call me. I look forward to following your patient along with you.  
 
 
Sincerely, 
 
Janki Ruiz MD

## 2019-08-01 NOTE — PROGRESS NOTES
Leyla Villela is a 46year old female with hypertensive heart disease and prior DVT, now transitioned from coumadin to Xarelto 20 mg once daily. Menstrual bleeding is heavy but stable over the change. She has fibroids and is contemplating surgery. 1-2 pads every few hours. She has 20 days of flow each cycle. She has run out of iron pills, she has episodes of lightheadedness, last Hgb in October '18 was 12.8. She has significant bilateral lymphedema, possibly related to the presence of uterine fibroids. She had initially been referred to us for assessment of palpitations. A patient triggered event monitor performed in November 2018, showed occasional simple ventricular ectopy with no complex forms. At that time, symptoms of dizziness, lightheadedness, chest pain, and dyspnea all corresponded to sinus tachycardia and the only arrhythmia ever witnessed was fairly infrequent simple ventricular ectopics. There was one burst of possible paroxysmal A. fib lasting for 6 beats which did not generate any symptoms. The echocardiogram showed low normal LV function without regional abnormality with diastolic impairment. LVEF was between 45 and 50%. There were no regional abnormalities of wall motion. Her palpitations are less noticeable to her today and her principal complaints are mild to moderate exertional dyspnea and episodes of lightheadedness both postural and with effort. There have been no episodes of syncope. If she sleeps reasonably well, she denies orthopnea and PND. With her chronic lymphedema it is difficult to comment regarding water-based leg edema. Her legs are always swollen. On examination, she weighs 247 pounds. She is 5 feet tall with BMI of 48.24. Respiratory rate is 16. Room air SPO2 is 99%. Left. Blood pressure is 114/90. Resting pulse is 77 bpm.  Cardiac auscultation shows regular rhythm, no ectopy noted during prolonged auscultation today.   S1 and S2 are normal, there is a soft S4 gallop along the left sternal border. There is no audible murmur. She has significant lymphedema both tibial areas. There are no skin breaks at this time and there is no sign of cellulitis. Tibial pulses are difficult to assess but show normal timing and her feet are warm. Twelve-lead EKG recorded today shows sinus rhythm, left axis deviation, delayed precordial R wave progression without indication of previous injury, and diffuse nonspecific T wave changes. There is no lab work in our system after October 2018. Hemoglobin was normal at that time. Impression: Dysfunctional uterine bleeding relative to probable fibroid tumors    History of DVT on oral anticoagulant    Lymphedema of both lower extremities, possibly related to abnormal pelvic content    Possible increasing anemia at this time    Benign ventricular ectopy in the context of nonobstructive LVH    Many of her cardiac symptoms arise from unpleasant awareness of normal cardiac activity, and occur in the presence of normal rhythm. To some extent this could be because of hypertrophy.     Plan:  Patient is cleared for gynecologic surgery as indicated without further testing    She should hold her Xarelto beginning 3 days preop and resume it when indicated by the surgeon    Patient was educated about protection from infection in the presence of lymphedema with, and she was also educated about the application of pneumatic compression devices (Jobst pump) for treatment    If of interest we can follow her postop to try to arrange for lymphedema treatment or alternatively this can be set up thigh her primary care physician    Semaj Gomez MD, [facility] [place]

## 2019-08-01 NOTE — PATIENT INSTRUCTIONS
You can have the surgery you need by simply holding the Xarelto for 3 days and restarting it after the surgery when the gynecologist indicates. The leg condition you have is LYMPHEDEMA. It can make you prone to infection from small injuries, and it can be treated by compression therapy. When the surgery is completed we can talk about that. My office note will clear you for the surgery.      The best device to treat the lymphedema is the Jobst Pump, which can be worn over night

## 2019-08-01 NOTE — PROGRESS NOTES
Chief Complaint   Patient presents with    Follow-up    Irregular Heart Beat     1. Have you been to the ER, urgent care clinic since your last visit? Hospitalized since your last visit? Yes, March, [facility], Chest discomfort. 2. Have you seen or consulted any other health care providers outside of the [address] [place] [address] since your last visit? Include any pap smears or colon screening.  No

## 2019-10-21 ENCOUNTER — OFFICE VISIT (OUTPATIENT)
Dept: CARDIOLOGY CLINIC | Age: 54
End: 2019-10-21

## 2019-10-21 VITALS
SYSTOLIC BLOOD PRESSURE: 140 MMHG | DIASTOLIC BLOOD PRESSURE: 90 MMHG | OXYGEN SATURATION: 99 % | HEIGHT: 60 IN | BODY MASS INDEX: 49.18 KG/M2 | HEART RATE: 85 BPM | WEIGHT: 250.5 LBS | RESPIRATION RATE: 18 BRPM

## 2019-10-21 DIAGNOSIS — I48.91 ATRIAL FIBRILLATION, UNSPECIFIED TYPE (HCC): Primary | ICD-10-CM

## 2019-10-21 NOTE — PROGRESS NOTES
Pamella Niño is a 53year old lady with a history of DVT, treated with warfarin in the past, now on Xarelto. As of her last visit in August she had dysfunctional uterine bleeding from fibroids. She also has chronic lymphedema and borderline LV systolic function on echo  (45-50%) with one previously documented episode of atrial fibrillation. Last month she only had some spotting, with some question whether rectal or vaginal. She has a hemarrhoid but she has abruptly stopped the vaginal bleeding from before. She still has severe bilateral knee pain worse with weight bearing. She has bilateral 3+ lymphedema with no skin breakdown and no sign of cellulitis. She has episodes of chest fluttering for a minute at a time. Once every 2-3 days. Not able to tolerate daily oral iron. Told she has bone on bone in her knees. She describes knee pain as a limiting factor well before dyspnea. EKG today shows sinus rhythm, low voltage QRS, normal AV conduction, no other changes. On examination, she is an overweight lady who appears approximately her stated age. She is 5 feet tall and she weighs 250 pounds. Body mass index is 49.22. Very soft ALLAN, regular rhythm, no gallop, normal S1 and S2. 140/90. Oral pharyngeal anatomy is class IV. Neck circumference is above normal.  JV are flat, there are no carotid bruits, there is no stridor awake and seated. Peripheral pulses transmit normally, there is bilateral lymphedema in the legs without skin breakdown and without any clear sign of DVT. (Historically she has had DVT before). Lungs are clear without wheezing, cardiac auscultation shows regular rhythm, normal quality S1 and S2, A2 and P2 approximately equal, no audible murmur or gallop. Abdomen is nontender, obese, without pulsation or bruit. Blood pressure is 150/88 on the left, and 140/90 on the right. She is in no distress at the time of exam, respiratory rate is 18.   Room air SPO2 is 99%.    Twelve-lead EKG performed today shows sinus rhythm, left axis deviation, left atrial conduction delay with normal AK interval, and delayed precordial R wave progression without indication of previous myocardial injury. Impression: Previous history of DVT on anticoagulant    Obesity    Previous history of paroxysmal atrial fibrillation and very brief events    Borderline LV systolic dysfunction, EF between 39 and 50%, no indication of CAD    History of dysfunctional uterine bleeding, more recently quiesced sent despite continued oral anticoagulant    History of benign ventricular ectopy    Chronic bilateral lymphedema, multifactorial with risk of cellulitis and soft tissue injury as well as recurrent DVT    Plan:   We will try an order for a home lymphedema pump    If episodes of palpitations or precordial flutter increase in frequency or duration she will need further work-up and possibly EP evaluation    Possible new orthopedic referral for joint intervention (Dr. Stanton Maki)    Home blood pressure measurements recommended with a call for anything in excess of 150/90    Next visit will be to assist with calibration of lymphedema therapeutic device if available    Oswaldo Vogel MD, [facility] [place]

## 2019-10-21 NOTE — PATIENT INSTRUCTIONS
We will fax off an order for a lymphedema pump to use at home. The company will contact you to check insurance etc. Please keep an eye on the episodes of fluttering. If they are brief and infrequent there are no worries, but if either duration or frequency increases, please call. You might get substantial relief of knee pain with Oleoresin capsicum (red pepper cream). Also called capsacin. An orthopedist with special expertise in knees is Dr. Herberth Butts. He is located in the [facility] in [place]. His phone number is [phone]. Please take a blood pressure once in a while. Call for numbers over 150 for the top number and over 90 for the second number.

## 2019-10-21 NOTE — PROGRESS NOTES
Chief Complaint   Patient presents with    Irregular Heart Beat     follow up, question when can stop Xarelto due to knee pain? 1. Have you been to the ER, urgent care clinic since your last visit? Hospitalized since your last visit? No    2. Have you seen or consulted any other health care providers outside of the [address] [place] [address] since your last visit? Include any pap smears or colon screening.  No

## 2019-11-04 ENCOUNTER — DOCUMENTATION ONLY (OUTPATIENT)
Dept: CARDIOLOGY CLINIC | Age: 54
End: 2019-11-04

## 2019-11-04 NOTE — PROGRESS NOTES
Faxed last 2 office visit notes and patient insurance information to [employer] to complete order for lymphedema pump. Fax confirmation received.

## 2019-12-12 ENCOUNTER — OFFICE VISIT (OUTPATIENT)
Dept: CARDIOLOGY CLINIC | Age: 54
End: 2019-12-12

## 2019-12-12 VITALS
SYSTOLIC BLOOD PRESSURE: 122 MMHG | HEIGHT: 60 IN | OXYGEN SATURATION: 99 % | RESPIRATION RATE: 16 BRPM | WEIGHT: 252 LBS | DIASTOLIC BLOOD PRESSURE: 80 MMHG | HEART RATE: 73 BPM | BODY MASS INDEX: 49.48 KG/M2

## 2019-12-12 DIAGNOSIS — I48.91 ATRIAL FIBRILLATION, UNSPECIFIED TYPE (HCC): Primary | ICD-10-CM

## 2019-12-12 DIAGNOSIS — I73.9 PERIPHERAL VASCULAR DISEASE (HCC): ICD-10-CM

## 2019-12-12 DIAGNOSIS — I89.0 LYMPHEDEMA OF BOTH LOWER EXTREMITIES: ICD-10-CM

## 2019-12-12 NOTE — PROGRESS NOTES
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 She has leg vein incompetence and chronic swelling complicated by bilateral lymphedema, currently without cellulitis. I had recommended mechanical treatment, but Lymphedema pumping from [employer] refused by insurance company. Will help with appeal if possible. Referred to Lymphedema Clinic, but they are not heavily invested in massage do not seem to prescribe mechanical means, and mainly recommend compression bandaging. On Xarelto. Minimal vaginal spotting during flu like symptoms. Has had blood in stool. It has been bright and very likely has a perirectal source, but she has been referred for colonoscopy. I told her I agreed with this strategy. Capsacin, CBD, Diclofenac. Ortho says lose weight. She is having a great deal of trouble with pain in her weightbearing joints. On examination, blood pressure is 122/80 in the left arm. She weighs 252 pounds. She is 5 feet tall. She is not in any distress during exam, room air saturation is 99%. Lungs are clear, there is no wheezing. Cardiac auscultation shows regular rhythm, normal S1 and S2, with a trivial systolic ejection murmur along the left sternal border. There is no diastolic murmur and no audible gallop. Legs are both severely swollen below the knee. There is no sign of cellulitis right now on either side but the pretibial areas are very tender. Dorsiflexion is also painful but calf compression does not hurt as long as there is no pressure on the anterior aspect. The most recent labs are in October. Creatinine was 1.03, electrolytes were normal.  Troponin was normal.  She is not anemic, white count was normal, platelet count was 463,694. Her most recent INR was in October and it was 4.1.   D-dimer in July was 0.22.    Impression: Obesity    Leg vein incompetence with chronic lymphedema and previous DVT with PTE    No current evidence of cellulitis    Best management would be with sequential compression devices but her insurance company does not agree. This would prevent recurrent cellulitis and on anticoagulant it would be a safe way to make future DVT for more unlikely. There would be significant reduction of incapacity and she might even be able to return to work. Patient was instructed regarding possible use of sequential manual massage techniques. Agree with pursuing colonoscopy    Will participate in an insurance appeal or peer to peer discussion if this can be made available.     Aly Yusuf MD, [facility] [place]

## 2019-12-12 NOTE — PROGRESS NOTES
Chief Complaint   Patient presents with    Leg Swelling     1. Have you been to the ER, urgent care clinic since your last visit? Hospitalized since your last visit? No    2. Have you seen or consulted any other health care providers outside of the [address] [place] [address] since your last visit? Include any pap smears or colon screening.  No

## 2019-12-12 NOTE — PATIENT INSTRUCTIONS
Any local remedy that helps your knees is OK. Manual massage should be gentle stroking from ankle upwards toward the mid thigh with skin lubricant. If the Lymphedema clinic recommends elastic wraps, they need to be applied before you get out of bed in the morning 
 
I'm glad you are having a colonoscopy I will help with the insurance appeal for the home lymphedema pump.

## 2019-12-13 ENCOUNTER — TELEPHONE (OUTPATIENT)
Dept: CARDIOLOGY CLINIC | Age: 54
End: 2019-12-13

## 2019-12-18 NOTE — TELEPHONE ENCOUNTER
Spoke with patient  Verified patient with 2 patient identifiers    Informed patient received letter stating [employer] did not approve compression pump for legs. Will forward letter to Dr Leigha Christian and advise. Patient verbalized understanding.

## 2019-12-31 PROBLEM — I89.0 LYMPHEDEMA OF BOTH LOWER EXTREMITIES: Status: ACTIVE | Noted: 2019-12-31

## 2019-12-31 PROBLEM — I73.9 PERIPHERAL VASCULAR DISEASE (HCC): Status: ACTIVE | Noted: 2019-12-31

## 2020-02-11 ENCOUNTER — HOSPITAL ENCOUNTER (OUTPATIENT)
Dept: MAMMOGRAPHY | Age: 55
Discharge: HOME | End: 2020-02-11
Attending: FAMILY MEDICINE
Payer: MEDICAID

## 2020-02-11 DIAGNOSIS — Z12.31 VISIT FOR SCREENING MAMMOGRAM: ICD-10-CM

## 2020-02-11 PROCEDURE — 77067 SCR MAMMO BI INCL CAD: CPT

## 2020-02-17 NOTE — PROGRESS NOTES
[facility] @ [address] [place], [place]  Subjective/HPI:     Brittnee Posadas is a 53 y.o. female is here for routine f/u. The patient denies chest pain/ resting shortness of breath, orthopnea, PND,, palpitations, syncope, presyncope. Patient reports she has been having intermittent episodes of fatigue but specifically denies dyspnea on exertion. Sleeps only on one pillow. Has a history of lower extremity lymphedema and questioning patient she has never been seen by lymphedema clinic. Previous cardiologist Dr. Joellen Liu had recommended compression machine to improve circulation which was denied by her insurance company at this time. She reports waking up in the morning her legs are much less swollen, they will swell as the day goes on. Does not use excessive amounts of salt. Specifically denies orthopnea or paroxysmal nocturnal dyspnea. She has no exertional chest pain. Medical review she has a history of left lower extremity DVTs with PE. She also has a diagnosis of atrial fibrillation. She denies fluttering or palpitations. Patient also has a history of obstructive sleep apnea reports evaluated at [facility] sleep medicine was prescribed a CPAP device however she has not been wearing it since she initially lost weight but has gained it back. Patient also seeing OB/GYN for large fibroids currently medically treated unknown if future surgery needed. Patient also reporting severe osteoarthritis in her knees, previous injections have not helped. Echocardiogram from 2018 shows grade 3 diastolic dysfunction with low normal ejection fraction.       11/2018 Holter:  Impression: The patient has reasonably frequent unifocal VPCs with no complex forms.  She had one bout of asymptomatic brief atrial fibrillation with rapid ventricular response.  There does not appear to be any correspondence between EKG changes and symptoms.  There are no ST segment changes. 9/2018 ECHO  SUMMARY:  Left ventricle: Systolic function was mildly reduced by visual assessment. Ejection fraction was estimated to be 45 % to 50 %. There were no regional  wall motion abnormalities. Doppler parameters were consistent with a  reversible restrictive pattern, indicative of decreased left ventricular  diastolic compliance and/or increased left atrial pressure (grade 3  diastolic dysfunction).    Atrial septum: The septum was thickened.     Tricuspid valve: There was mild regurgitation. Regurgitation grade was 1+  on a scale of 0 to 4+.     Aorta, systemic arteries: The root exhibited upper limit of normal size  and mild fibrocalcific change.     Inferior vena cava, hepatic veins: Respirophasic changes were blunted  (less than 50% variation). PCP Provider  Esther Summers MD  Past Medical History:   Diagnosis Date    Hypertension     Lymphedema of both lower extremities 12/31/2019    History of secondary cellulitis as well as DVT with PTE    Thromboembolus ([address]     \"and PEs\"      Past Surgical History:   Procedure Laterality Date    HX BREAST BIOPSY Bilateral     2004 stereo bx?     HX ORTHOPAEDIC      right hand     Allergies   Allergen Reactions    Lisinopril Swelling      No family history on file. Current Outpatient Medications   Medication Sig    XARELTO 20 mg tab tablet TAKE 1 TABLE TBY MOUTH DAILY    metoprolol succinate (TOPROL-XL) 50 mg XL tablet TAKE 1 TABLET BY MOUTH ONCE DAILY    spironolactone (ALDACTONE) 25 mg tablet TAKE 1 TABLET BY MOUTH ONCE DAILY    Inulin-Sorbitol (FIBER SUPPLEMENT, INULIN,) 2 gram chew Take  by mouth daily.  OTHER DIGEST A ZYYME    pantoprazole (PROTONIX) 40 mg tablet Take 40 mg by mouth daily.  medroxyPROGESTERone (PROVERA) 10 mg tablet Take 10 mg by mouth as needed.  ferrous sulfate 325 mg (65 mg iron) tablet Take 325 mg by mouth two (2) times a day. No current facility-administered medications for this visit. There were no vitals filed for this visit. Social History     Socioeconomic History    Marital status: SINGLE     Spouse name: Not on file    Number of children: Not on file    Years of education: Not on file    Highest education level: Not on file   Occupational History    Not on file   Social Needs    Financial resource strain: Not on file    Food insecurity:     Worry: Not on file     Inability: Not on file    Transportation needs:     Medical: Not on file     Non-medical: Not on file   Tobacco Use    Smoking status: Never Smoker    Smokeless tobacco: Never Used   Substance and Sexual Activity    Alcohol use: Yes     Comment: occ    Drug use: No    Sexual activity: Not on file   Lifestyle    Physical activity:     Days per week: Not on file     Minutes per session: Not on file    Stress: Not on file   Relationships    Social connections:     Talks on phone: Not on file     Gets together: Not on file     Attends Buddhism service: Not on file     Active member of club or organization: Not on file     Attends meetings of clubs or organizations: Not on file     Relationship status: Not on file    Intimate partner violence:     Fear of current or ex partner: Not on file     Emotionally abused: Not on file     Physically abused: Not on file     Forced sexual activity: Not on file   Other Topics Concern    Not on file   Social History Narrative    ** Merged History Encounter **            I have reviewed the nurses notes, vitals, problem list, allergy list, medical history, family, social history and medications. Review of Symptoms:  11 systems reviewed and are negative unless stated in the HPI       Physical Exam:      General: Well developed, in no acute distress, cooperative and alert  HEENT: No carotid bruits, no JVD, trach is midline. Neck Supple, PERRL, EOM intact. Heart:  Normal S1/S2 negative S3 or S4. Regular, no murmur, gallop or rub.    Respiratory: Clear bilaterally x 4, no wheezing or rales  Abdomen:   Soft, non-tender, no masses, bowel sounds are active. Extremities: +2 bilateral nonpitting lower extremity edema, normal cap refill, no cyanosis, atraumatic. Neuro: A&Ox3, speech clear, gait is being assisted with cane  Skin: Skin color is normal. No rashes or lesions. Non diaphoretic  Vascular: 2+ pulses symmetric in all extremities    Cardiographics    ECG: Normal sinus rhythm        Cardiology Labs:  No results found for: CHOL, CHOLX, CHLST, CHOLV, 562584, HDL, HDLP, LDL, LDLC, DLDLP, TGLX, TRIGL, TRIGP, CHHD, CHHDX    Lab Results   Component Value Date/Time    Sodium 138 10/15/2018 05:39 PM    Potassium 3.8 10/15/2018 05:39 PM    Chloride 105 10/15/2018 05:39 PM    CO2 28 10/15/2018 05:39 PM    Anion gap 5 10/15/2018 05:39 PM    Glucose 94 10/15/2018 05:39 PM    BUN 16 10/15/2018 05:39 PM    Creatinine 1.03 (H) 10/15/2018 05:39 PM    BUN/Creatinine ratio 16 10/15/2018 05:39 PM    GFR est AA >60 10/15/2018 05:39 PM    GFR est non-AA 56 (L) 10/15/2018 05:39 PM    Calcium 8.6 10/15/2018 05:39 PM    Bilirubin, total 1.3 (H) 10/15/2018 05:39 PM    AST (SGOT) 21 10/15/2018 05:39 PM    Alk. phosphatase 51 10/15/2018 05:39 PM    Protein, total 8.3 (H) 10/15/2018 05:39 PM    Albumin 3.7 10/15/2018 05:39 PM    Globulin 4.6 (H) 10/15/2018 05:39 PM    A-G Ratio 0.8 (L) 10/15/2018 05:39 PM    ALT (SGPT) 14 10/15/2018 05:39 PM        No results found for: CPK, CK, CKMMB, CKMB, RCK3, CKMBT, CKMBPOC, CKNDX, CKND1, BRAVO, TROPT, TROIQ, ARNULFO, TROPT, TNIPOC, BNP, BNPP, BNPNT    No results found for: HBA1C, HGBE8, ZII8RHVS   Assessment:     Assessment:     Diagnoses and all orders for this visit:    1. Atrial fibrillation, unspecified type ([facility]    2. Lymphedema of both lower extremities        ICD-10-CM ICD-9-CM    1. Atrial fibrillation, unspecified type ([facility] I48.91 427.31    2.  Lymphedema of both lower extremities I89.0 457.1      No orders of the defined types were placed in this encounter. Plan:     1. Paroxysmal atrial fibrillation: Initial diagnosis via Holter monitor November 2018. Continue metoprolol, continue Xarelto 20 mg  2. Hypertension: Repeat at end of visit 138/90. Will improve with mild diuresis. Dependent on her ejection fraction may need additional medications, of note she has a ACE inhibitor allergy of swelling. 3.  Lower extremity lymphedema versus venous reflux: Will schedule venous reflux study at [facility] cardiology office. If she does have venous reflux I will have her consult with Dr. Rainer Almaraz for vascular evaluation. If she does not have venous reflux then she will be referred to the lymphedema clinic who can assist her and leg wrapping and or compression device. 4.  History of grade 3 diastolic dysfunction on echocardiogram 2018: We will repeat echo for 2020, starting low-dose furosemide 20 mg daily as needed. 5.  Obstructive sleep apnea: Highly recommend patient follow-up with [facility] to reinitiate CPAP therapy. Atrial Fibrillation CHADSVASC2 Score Stroke Risk:   53 y.o. <65        + 0    female Female +1   CHF HX: No    + 0   HTN HX: Yes    +1   Stroke/TIA/Thromboembolism Yes   +2   Vascular Disease HX: No    + 0   Diabetes Mellitus No    + 0   CHADSVASC 2 Score 4      Annual Stroke Risk 4.8%- moderate-high      Has history of hypertension, female, history of DVTs. Does have a history of diastolic dysfunction on echo. ChadsVas score minimum of 4,     Follow-up in 6 to 8 weeks. Titus Scheuermann, NP      Please note that this dictation was completed with [employer]kill, the computer voice recognition software. Quite often unanticipated grammatical, syntax, homophones, and other interpretive errors are inadvertently transcribed by the computer software. Please disregard these errors. Please excuse any errors that have escaped final proofreading. Thank you.

## 2020-02-19 ENCOUNTER — OFFICE VISIT (OUTPATIENT)
Dept: CARDIOLOGY CLINIC | Age: 55
End: 2020-02-19

## 2020-02-19 VITALS
BODY MASS INDEX: 49.9 KG/M2 | RESPIRATION RATE: 18 BRPM | OXYGEN SATURATION: 99 % | SYSTOLIC BLOOD PRESSURE: 138 MMHG | WEIGHT: 254.2 LBS | HEART RATE: 82 BPM | HEIGHT: 60 IN | DIASTOLIC BLOOD PRESSURE: 90 MMHG

## 2020-02-19 DIAGNOSIS — G47.33 OSA (OBSTRUCTIVE SLEEP APNEA): ICD-10-CM

## 2020-02-19 DIAGNOSIS — R06.09 DOE (DYSPNEA ON EXERTION): ICD-10-CM

## 2020-02-19 DIAGNOSIS — I89.0 LYMPHEDEMA OF BOTH LOWER EXTREMITIES: ICD-10-CM

## 2020-02-19 DIAGNOSIS — I48.91 ATRIAL FIBRILLATION, UNSPECIFIED TYPE (HCC): Primary | ICD-10-CM

## 2020-02-19 DIAGNOSIS — I10 ESSENTIAL HYPERTENSION: ICD-10-CM

## 2020-02-19 RX ORDER — CHLORPHENIRAMINE MALEATE 4 MG
TABLET ORAL
COMMUNITY
Start: 2019-12-21

## 2020-02-19 RX ORDER — FUROSEMIDE 20 MG/1
20 TABLET ORAL AS NEEDED
Qty: 30 TAB | Refills: 1 | Status: SHIPPED | OUTPATIENT
Start: 2020-02-19 | End: 2020-11-06 | Stop reason: SDUPTHER

## 2020-02-19 RX ORDER — AZITHROMYCIN 500 MG/1
TABLET, FILM COATED ORAL
COMMUNITY
Start: 2020-02-18 | End: 2020-04-20 | Stop reason: ALTCHOICE

## 2020-02-19 NOTE — LETTER
2/19/20 Patient: Olivier Carmichael YOB: 1965 Date of Visit: 2/19/2020 Collin Palmer MD 
[address] [address] VIA Facsimile: [phone] Dear Collin Palmer MD, Thank you for referring Ms. Laura Armstrong to [address] [place] [address] for evaluation. My notes for this consultation are attached. If you have questions, please do not hesitate to call me. I look forward to following your patient along with you. Sincerely, Bakari Sanz NP

## 2020-02-19 NOTE — PROGRESS NOTES
Chief Complaint   Patient presents with    Irregular Heart Beat     Patient concerned about fluid retention and c/o a little tickle like feeling in chest. Also, need refill medications. Please send Xarelto to [facility] other refills go to [facility]     1. Have you been to the ER, urgent care clinic since your last visit? Hospitalized since your last visit? No    2. Have you seen or consulted any other health care providers outside of the [address] [place] [address] since your last visit? Include any pap smears or colon screening.  No

## 2020-03-09 RX ORDER — SPIRONOLACTONE 25 MG/1
TABLET ORAL
Qty: 30 TAB | Refills: 6 | Status: SHIPPED | OUTPATIENT
Start: 2020-03-09 | End: 2020-10-29

## 2020-03-09 RX ORDER — METOPROLOL SUCCINATE 50 MG/1
TABLET, EXTENDED RELEASE ORAL
Qty: 30 TAB | Refills: 6 | Status: SHIPPED | OUTPATIENT
Start: 2020-03-09 | End: 2020-10-29

## 2020-03-10 ENCOUNTER — HOSPITAL ENCOUNTER (OUTPATIENT)
Dept: PHYSICAL THERAPY | Age: 55
Discharge: HOME | End: 2020-03-10
Payer: MEDICAID

## 2020-03-10 VITALS — BODY MASS INDEX: 49.28 KG/M2 | WEIGHT: 251 LBS | HEIGHT: 60 IN

## 2020-03-10 PROCEDURE — 97161 PT EVAL LOW COMPLEX 20 MIN: CPT

## 2020-03-10 PROCEDURE — 97140 MANUAL THERAPY 1/> REGIONS: CPT

## 2020-03-10 NOTE — PROGRESS NOTES
[facility]  [address] [facility], [address] [place] [address].    EVALUATION    NAME: Abdullahi Rivas AGE: 53 y.o. GENDER: female  DATE: 3/10/2020  REFERRING PHYSICIAN:  Dr. Braulio Cantu:   Primary Diagnosis:  · B LE lymphedema, secondary (I89.0)  Other Treatment Diagnoses:  · Other abnormalities of gait (R26.89)  · Swelling not relieved by elevation (R60.9)  ·  History of deep vein thrombosis (DVT) of lower extremity (Z86.718)  ·  Atrial fibrillation (HCC)(I48.91)  · Morbid obesity due to excess calories (E66.01)  · Peripheral vascular disease (HCC) (I73.9)  Date of Onset: 3/1/2020  Present Symptoms and Functional Limitations: Patient reports noticing swelling LE: Bilateral that developed about 15 ago. She reports the swelling started in her left leg first and is now present bilaterally. The swelling pre-existed her history of a left leg DVT with PE which she reports occurred when she was living in [place] about 2 years ago. She was a  and her swelling was so bad after the DVT that she had to stop working and she returned to [place] to reside with family. 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 Patient reports the diuretics have helped her swelling some. She does not report a significant family history of swelling but she has a brother and nephew with swelling that she feels may be from other medical problems. [facility] Lymphedema Assessment Scale: 11/20. Past Medical History:  Patient reports a history of arthritis in bilateral knees.   Past Medical History:   Diagnosis Date    Hypertension     Lymphedema of both lower extremities 12/31/2019    History of secondary cellulitis as well as DVT with PTE    Thromboembolus ([address]     \"and PEs\"     Past Surgical History:   Procedure Laterality Date    HX BREAST BIOPSY Bilateral     2004 stereo bx?     HX ORTHOPAEDIC      right hand     Current Medications:    Current Outpatient Medications   Medication Sig    metoprolol succinate (TOPROL-XL) 50 mg XL tablet TAKE 1 TABLET BY MOUTH ONCE DAILY    spironolactone (ALDACTONE) 25 mg tablet TAKE 1 TABLET BY MOUTH ONCE DAILY    azithromycin (ZITHROMAX) 500 mg tab     clotrimazole (LOTRIMIN) 1 % topical cream     furosemide (LASIX) 20 mg tablet Take 1 Tab by mouth as needed (edema).  XARELTO 20 mg tab tablet TAKE 1 TABLE TBY MOUTH DAILY    Inulin-Sorbitol (FIBER SUPPLEMENT, INULIN,) 2 gram chew Take  by mouth daily.  OTHER DIGEST A ZYYME    pantoprazole (PROTONIX) 40 mg tablet Take 40 mg by mouth daily.  medroxyPROGESTERone (PROVERA) 10 mg tablet Take 10 mg by mouth as needed.  ferrous sulfate 325 mg (65 mg iron) tablet Take 325 mg by mouth two (2) times a day. No current facility-administered medications for this encounter. Allergies: Allergies   Allergen Reactions    Lisinopril Swelling        Prior Level of Function/Social/Work History/Personal factors and/or comorbidities impacting plan of care: Patient is not currently employed but she worked previously as a  traveling between [place] She has had LE: Bilateral lymphedema for about 15 years. She resides with her brother and nephew in the [facility]. Drives herself.   Living Situation: private residence one story with a ramp to enter  Trainable Caregiver?: No  Self-care/ADLs: Simple home management: Modified independent     Mobility: Level of assistance:  Modified independent  Assistive device: rollator  Sleeping Arrangement:  in bed  Adaptive Equipment Owned: rollator, walk in shower, cane, ramp     Previous Therapy:  Patient does participate in some exercise and she goes to the  about 2 days a week to use seated elliptical style equipment and the pool when appropriate  Compression/Lymphedema Equipment: She does not currently wear a bilateral lower extremity: Other: lower extremity compression garments compression garment. SUBJECTIVE:  My legs have been swelling for a long time and I used to buy compression knee highs to wear when I was driving. I can't wear them anymore because it is hard to find any that fit me and my left calf is tender so it hurts to wear them. My leg is sometimes harder or firmer than others. Patient's goals for therapy: To decrease swelling    OBJECTIVE DATA SUMMARY:   EXAMINATION/PRESENTATION/DECISION MAKING:   Pain:   Patient has no complaints of pain today in her legs other than tenderness to touch on left posterior calf, but she reports she does have intermittent pain that she would rate as 8/10 numeric scale when present. Pain Scale 1: Numeric (0 - 10)     Pain Intensity 1: 0                         Self-Care and ADLs:  Grooming: Modified independent  Bathing: Modified independent uses step in shower on days when her knees and legs bother her more   UB Dressing: Independent  LB Dressing: Modified independent    Don/Doff Shoes/Socks: Modified independent with increased time and effort Toileting: Independent           Skin and Tissue Assessment:  Dermal Status: Intact, Dry and Flaky    Texture/Consistency: Boggy     Pigmentation/Color Change: Hyperpigmented    Anomalies: N/A    Circulatory: Pulses, DVT history and Varicosities    Nails: Fungus    Stemmers Sign: negative  Height:  Height: 5' (152.4 cm)  Weight:  Weight: 113.9 kg (251 lb)   BMI:  BMI (calculated): 49  (36 or greater: adversely affecting lymphedema)  Wound/Ulcer:  N/A      Wound Pain:   N/A  Volumetric Measurements:   Right:  12,452.46mL Left: 11,543.26mL   % Difference: 7.88% right larger than left Dominance: Not assessed   (See scanned graph)  Foot swelling is present bilaterally.   Skin is intact but bottoms of feet are dry and peeling. Range of Motion: [facility] for bilateral lower extremities  Strength: Not formally assessed 2* patient is able to complete all functional activities in the clinic today. Sensation:  Intact     Mobility:    Bed/Chair Mobility:  Modified independent  Transfers:  Modified independent    Sitting Balance:  good Standing Balance:  good   Gait:   Independent ambulation with rollator walker for community distances, patient is able to ambulate without walker with decreased jasmin and step length and a limp  Wheelchair Mobility:   N/A   Endurance:   good Stairs:  Does not need to do them, has a step to enter the house       Safety:  Patient is alert and oriented:  yes   Safety awareness:  good   Fall Risk?:  Moderate due to knee arthritis and leg swelling   Patient given written fall prevention handout: Yes     Precautions:  Standard lymphedema precautions to include avoiding blood pressure readings, injections and IVs or other procedures/acts that could lead to broken skin on affected area, and avoiding excessive heat, resistive activity or altitude without compression garment    Based on the above components, the patient evaluation is determined to be of the following complexity level: LOW     Evaluation Time: 5125-4453 40 minutes    TREATMENT PROVIDED:      1. Treatment description:  Manual Therapy: Patient instructed in skin care principles and anatomy of the lymphatic system. Therapist able to demonstrate skin care for lower extremities following manual lymphatic drainage patterns with skin care with low pH lotion. Reviewed with patient the signs and symptoms of cellulitis as well as to contact her physician if symptoms develop. Discussed the chronic nature of swelling and the need to manage it over time as well as that there is no cure for the condition. Discussed the anatomy and physiology of the lymphatic system and why swelling can occur with regard to her medical history.   Educated patient in treatment components and goals including the role of multi-layer bandaging to reduce swelling versus compression garments to manage swelling. Patient does not feels she will be able to manage with bandaging and that she would have the family support to pursue this more intensive treatment. She is interested in being educated in and obtaining tools for managing her swelling. Discussed compression garments and the ability to measure her for products that will fit her. She is agreeable to this option and will try velcro and tradiitonal product samples next session to determine which ones will be more appropriate for her. Patient would also benefit from a vasopneumatic pump for home and we will pursue this tool for her as well. Patient is interested in obtaining a vasopneumatic device. Encouraged patient to continue to exercise and discussed the benefits of exercise and the role of the muscle pump. Treatment time:  7689-8644  Minutes: 30  ASSESSMENT:   Odilia Roa is a 53 y.o. 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 Patient is motivated to improve her condition. She has limited family support and some impaired mobility and is opting not to pursue intensive treatment with multi-layer bandaging at this time. She will benefit from education and being measured for appropriate compression garments and tools including a vasopneumatic device for home use. Patient's condition is complicated by HTN and arthitis. Patient will benefit from complete decongestive therapy (CDT) including: Manual lymphatic drainage (MLD) technique and Short-stretch textile bandages/compression system to decongest limb as appropriate. Patient will receive instruction in proper skin care to recognize signs/symptoms of and prevent infection, therapeutic exercise, and self-MLD for independent home program and restorative lymphatic performance.     This care is medically necessary due to the infection risk with lymphedema and to improve functional activities. CDT is necessary to resolve swelling to allow patient to return to wearing normal clothes/footwear and prevent worsening of symptoms, such as venous stasis ulcerations, infections, or hospitalizations. Patient will be independent with home program strategies to allow improved ADL ability and mobility and to allow patient to return to greatest functional independence. Rehabilitation potential is considered to be good. Factors which may influence rehabilitation potential include patient is eager to improve her condition. Patient will benefit from 8-10  physical therapy visits over 10-12 weeks to optimize improvement in these areas. PLAN OF CARE:   Recommendations and Planned Interventions: Manual lymph drainage/decongestive therapy, Compression garment fitting/provision, Lymphedema therapeutic exercise, AROM/PROM/Strength/Coordination, Self-care training, Functional mobility training, Education in skin care and lymphedema precautions, Self-MLD education per home program and Caregiver education as needed    GOALS  Short term goals  Time frame: 4/30/2020  1. Patient will demonstrate knowledge of signs/symptoms of infections/cellulitis and be independent in skin care to prevent cellulitis. 2.  Patient will demonstrate independence in lymphedema home program of therapeutic exercises to improve circulation and decongest limb to improve ADLs. 3.  Patient will be measured for appropriate compression products for bilateral lower extremities to prevent worsening of her swelling over time. Long term goals  Time frame: 6/5/2020  1. Pt will show improvement in the [facility] Lymphedema Assessment Scale by decreasing the score to 9 and thus allow improvement in patient's quality of life.   2.  Patient will be independent with don/doff of compression system and use in order to prevent reaccumulation of fluid at discharge. 3.  Patient will receive a home vasopneumatic device for optimal long term home management of her lymphedema. 4.  Pt will be independent in self-MLD and show stable limb volumes showing decongestion and pt. ready for transition to independent restorative phase of lymphedema therapy. Patient has participated in goal setting and agrees to work toward plan of care. Patient was instructed to call if questions or concerns arise. Thank you for this referral.  Petey Soto, KYLET, CLT-IRMA    Time Calculation: 70 mins    TREATMENT PLAN EFFECTIVE DATES:   3/10/2020 TO 6/5/2020  I have read the above plan of care for Mariama Reyes. I certify the above prescribed services are required by this patient and are medically necessary.   The above plan of care has been developed in conjunction with the lymphedema/physical therapist.       Physician Signature: ____________________________________Date:______________                                      Dr. Santos Mckeon

## 2020-03-17 ENCOUNTER — TELEPHONE (OUTPATIENT)
Dept: CARDIOLOGY CLINIC | Age: 55
End: 2020-03-17

## 2020-03-17 NOTE — TELEPHONE ENCOUNTER
Cancelled patients apt this afternoon, she would like a call for her results she does not want to come in unless she has to.     Thanks

## 2020-03-18 NOTE — TELEPHONE ENCOUNTER
Spoke to patient using 2 identifiers. Per Dr. Ca Cortés, she was made aware of the following:    MD Warden Master Gutierrez, OMAR; [place] 20 hours ago (3:11 PM)      Inform her that on LE venous doppler no DVT noted. Reschedule her vascualr appt with me in May [place]. Patient verbalized understanding.

## 2020-03-20 ENCOUNTER — APPOINTMENT (OUTPATIENT)
Dept: PHYSICAL THERAPY | Age: 55
End: 2020-03-20
Payer: MEDICAID

## 2020-03-27 ENCOUNTER — APPOINTMENT (OUTPATIENT)
Dept: PHYSICAL THERAPY | Age: 55
End: 2020-03-27
Payer: MEDICAID

## 2020-04-20 ENCOUNTER — OFFICE VISIT (OUTPATIENT)
Dept: CARDIOLOGY CLINIC | Age: 55
End: 2020-04-20

## 2020-04-20 ENCOUNTER — DOCUMENTATION ONLY (OUTPATIENT)
Dept: CARDIOLOGY CLINIC | Age: 55
End: 2020-04-20

## 2020-04-20 VITALS — BODY MASS INDEX: 49.28 KG/M2 | HEIGHT: 60 IN | WEIGHT: 251 LBS

## 2020-04-20 DIAGNOSIS — I48.0 PAROXYSMAL ATRIAL FIBRILLATION (HCC): Primary | ICD-10-CM

## 2020-04-20 DIAGNOSIS — I73.9 PERIPHERAL VASCULAR DISEASE (HCC): ICD-10-CM

## 2020-04-20 DIAGNOSIS — I87.2 VENOUS REFLUX: ICD-10-CM

## 2020-04-20 RX ORDER — POLYETHYLENE GLYCOL-3350, SODIUM CHLORIDE, POTASSIUM CHLORIDE AND SODIUM BICARBONATE 420; 11.2; 5.72; 1.48 G/438.4G; G/438.4G; G/438.4G; G/438.4G
POWDER, FOR SOLUTION ORAL
COMMUNITY
Start: 2020-03-04 | End: 2021-05-10 | Stop reason: ALTCHOICE

## 2020-04-20 RX ORDER — BUDESONIDE AND FORMOTEROL FUMARATE DIHYDRATE 80; 4.5 UG/1; UG/1
2 AEROSOL RESPIRATORY (INHALATION)
COMMUNITY
Start: 2020-03-30

## 2020-04-20 NOTE — PROGRESS NOTES
Faxed virtual office visit note with Tiffanie Santillan NP dated 4/20/2020 to [place]. stating patient cleared for colonoscopy can hold Xarelto 48 hours prior to procedure.    PYX#[phone] confirmation received

## 2020-04-20 NOTE — LETTER
4/20/20 Patient: Chrissy Burris YOB: 1965 Date of Visit: 4/20/2020 Jenn Sweet MD 
[address] [facility] [address] [address] VIA Facsimile: [phone] Dear Jenn Sweet MD, Thank you for referring Ms. Nicole Sullivan to [facility] for evaluation. My notes for this consultation are attached. If you have questions, please do not hesitate to call me. I look forward to following your patient along with you. Sincerely, Citlalli Chamberlain NP

## 2020-04-20 NOTE — PROGRESS NOTES
Chief Complaint   Patient presents with    Irregular Heart Beat     doxy via smart phone# [phone] c/o a little flutter at times on left side around breast. Not able to get BP reading today    Surgical Clearance     clearance for colonoscopy     1. Have you been to the ER, urgent care clinic since your last visit? Hospitalized since your last visit?no    2. Have you seen or consulted any other health care providers outside of the [address] [place] [address] since your last visit? Include any pap smears or colon screening.  no

## 2020-04-20 NOTE — PROGRESS NOTES
[facility]                                                                                  Mariza Sahni DNP, [facility]    Cardiology Telemedicine Encounter                                                         Pursuant to the emergency declaration under the [address] waiver authority and the [employer] Act, this Virtual  Visit was conducted, with patient's consent, to reduce the patient's risk of exposure to COVID-19 and provide continuity of care for an established patient. Services were provided through a video synchronous discussion virtually to substitute for in-person clinic visit. Subjective/HPI:   Bassem Ram is a 53 y.o. female who was seen by synchronous (real-time) audio-video technology on 4/20/2020. To recall patient is a 79-year-old female with a history of paroxysmal atrial fibrillation maintained on metoprolol and Xarelto. At last visit due to history of grade 3 diastolic dysfunction echocardiogram was performed, notes grade 1 diastolic dysfunction with normal systolic function. Persistent lower extremity edema, reflux study performed showing this insufficiency and will be following up with Dr. Gerda Mcgraw. Patient is also requesting clearance for colonoscopy over the summer. PLAN FROM PRIOR VISIT:  Plan:      1.  Paroxysmal atrial fibrillation: Initial diagnosis via Holter monitor November 2018. Continue metoprolol, continue Xarelto 20 mg  2. Hypertension: Repeat at end of visit 138/90. Will improve with mild diuresis. Dependent on her ejection fraction may need additional medications, of note she has a ACE inhibitor allergy of swelling. 3.  Lower extremity lymphedema versus venous reflux: Will schedule venous reflux study at [facility] cardiology office.   If she does have venous reflux I will have her consult with Dr. Gerda Mcgraw for vascular evaluation. If she does not have venous reflux then she will be referred to the lymphedema clinic who can assist her and leg wrapping and or compression device. 4.  History of grade 3 diastolic dysfunction on echocardiogram 2018: We will repeat echo for 2020, starting low-dose furosemide 20 mg daily as needed. 5.  Obstructive sleep apnea: Highly recommend patient follow-up with [facility] to reinitiate CPAP therapy.     Atrial Fibrillation CHADSVASC2 Score Stroke Risk:   53 y.o. <65        + 0    female Female +1   CHF HX: No    + 0   HTN HX: Yes    +1   Stroke/TIA/Thromboembolism Yes   +2   Vascular Disease HX: No    + 0   Diabetes Mellitus No    + 0   CHADSVASC 2 Score 4      Annual Stroke Risk 4.8%- moderate-high       Has history of hypertension, female, history of DVTs. Does have a history of diastolic dysfunction on echo. ChadsVas score minimum of 4,     ECHO 3/5/2020  Echo Findings     Left Ventricle Normal cavity size and systolic function (ejection fraction normal). Mild concentric hypertrophy . The estimated ejection fraction is 55 - 60%. LV wall motion is noted to be no regional wall motion abnormality. There is mild (grade 1) left ventricular diastolic dysfunction. Left Atrium Normal cavity size. Right Ventricle Normal cavity size and global systolic function. Right Atrium Normal cavity size. Aortic Valve Trileaflet valve structure, no stenosis and no regurgitation. Normal aortic leaflet mobility. Mitral Valve No stenosis. Leaflet calcification. There is mild anterior leaflet calcification diffusely. There is mild posterior leaflet calcification diffusely. Mild mitral annular calcification. Trace regurgitation. Tricuspid Valve Normal valve structure, no stenosis and no regurgitation. There is no evidence of pulmonary hypertension. Pulmonic Valve Normal valve structure and no stenosis. Trace regurgitation. Aorta Normal aortic root and ascending aorta.    Pericardium No evidence of pericardial effusion. VENOUS STUDY  Interpretation Summary     · No evidence of acute deep vein thrombosis in the right common femoral, superficial femoral, popliteal, posterior tibial, and peroneal veins. The veins were imaged in the transverse and longitudinal planes. The vessels showed normal color filling and compressibility. Doppler interrogation showed phasic and spontaneous flow. · Age indeterminate non-occlusive thrombus present in the left mid femoral vein. · Age indeterminate non-occlusive thrombus present in the left distal femoral vein. · Age indeterminate non-occlusive thrombus present in the left popliteal vein. · Right great saphenous vein displays venous insufficiency. · Right small saphenous vein is competent. · Left great saphenous vein displays venous insufficiency. Left small saphenous vein displays venous insufficiency. PCP Provider  Margarita King MD  Past Medical History:   Diagnosis Date    Hypertension     Lymphedema of both lower extremities 12/31/2019    History of secondary cellulitis as well as DVT with PTE    Thromboembolus ([address]     \"and PEs\"      Past Surgical History:   Procedure Laterality Date    HX BREAST BIOPSY Bilateral     2004 stereo bx?     HX ORTHOPAEDIC      right hand     Allergies   Allergen Reactions    Lisinopril Swelling      Family History   Problem Relation Age of Onset    Cancer Mother     Stroke Father       Current Outpatient Medications   Medication Sig    metoprolol succinate (TOPROL-XL) 50 mg XL tablet TAKE 1 TABLET BY MOUTH ONCE DAILY    spironolactone (ALDACTONE) 25 mg tablet TAKE 1 TABLET BY MOUTH ONCE DAILY    clotrimazole (LOTRIMIN) 1 % topical cream     furosemide (LASIX) 20 mg tablet Take 1 Tab by mouth as needed (edema). (Patient taking differently: Take 10 mg by mouth as needed (edema). )    XARELTO 20 mg tab tablet TAKE 1 TABLE TBY MOUTH DAILY    Inulin-Sorbitol (FIBER SUPPLEMENT, INULIN,) 2 gram chew Take  by mouth daily.    medroxyPROGESTERone (PROVERA) 10 mg tablet Take 10 mg by mouth as needed.  ferrous sulfate 325 mg (65 mg iron) tablet Take 325 mg by mouth two (2) times a day.  Symbicort 80-4.5 mcg/actuation HFAA Take 2 Puffs by inhalation two (2) times a day.  GaviLyte-N 420 gram solution     OTHER DIGEST A ZYYME    pantoprazole (PROTONIX) 40 mg tablet Take 40 mg by mouth daily. No current facility-administered medications for this visit.        Vitals:    04/20/20 1249   Weight: 251 lb (113.9 kg)   Height: 5' (1.524 m)     Social History     Socioeconomic History    Marital status: SINGLE     Spouse name: Not on file    Number of children: Not on file    Years of education: Not on file    Highest education level: Not on file   Occupational History    Not on file   Social Needs    Financial resource strain: Not on file    Food insecurity     Worry: Not on file     Inability: Not on file    Transportation needs     Medical: Not on file     Non-medical: Not on file   Tobacco Use    Smoking status: Never Smoker    Smokeless tobacco: Never Used   Substance and Sexual Activity    Alcohol use: Yes     Comment: occ    Drug use: No    Sexual activity: Not on file   Lifestyle    Physical activity     Days per week: Not on file     Minutes per session: Not on file    Stress: Not on file   Relationships    Social connections     Talks on phone: Not on file     Gets together: Not on file     Attends [employer] service: Not on file     Active member of club or organization: Not on file     Attends meetings of clubs or organizations: Not on file     Relationship status: Not on file    Intimate partner violence     Fear of current or ex partner: Not on file     Emotionally abused: Not on file     Physically abused: Not on file     Forced sexual activity: Not on file   Other Topics Concern    Not on file   Social History Narrative    ** Merged History Encounter **            Review of Symptoms  11 systems reviewed, negative other than as stated in the HPI    Physical Exam:    Due to this being a TeleHealth evaluation, many elements of the physical examination are unable to be assessed. General: Well developed, in no acute distress, cooperative and alert  HEENT: Pupils equal/round. No marked JVD visible on video. Respiratory: No audible wheezing, no signs of respiratory distress, lips non cyanotic  Extremities: Would estimate minimum of +2 bilateral dependent edema  Neuro: A&Ox3, speech clear, answering questions appropriately  Skin: Skin color is normal. No rashes or lesions. Non diaphoretic on visible skin during exam      Cardiology Labs:  No results found for: CHOL, CHOLX, CHLST, CHOLV, 548160, HDL, HDLP, LDL, LDLC, DLDLP, TGLX, TRIGL, TRIGP, CHHD, CHHDX    Lab Results   Component Value Date/Time    Sodium 138 10/15/2018 05:39 PM    Potassium 3.8 10/15/2018 05:39 PM    Chloride 105 10/15/2018 05:39 PM    CO2 28 10/15/2018 05:39 PM    Anion gap 5 10/15/2018 05:39 PM    Glucose 94 10/15/2018 05:39 PM    BUN 16 10/15/2018 05:39 PM    Creatinine 1.03 (H) 10/15/2018 05:39 PM    BUN/Creatinine ratio 16 10/15/2018 05:39 PM    GFR est AA >60 10/15/2018 05:39 PM    GFR est non-AA 56 (L) 10/15/2018 05:39 PM    Calcium 8.6 10/15/2018 05:39 PM    Bilirubin, total 1.3 (H) 10/15/2018 05:39 PM    AST (SGOT) 21 10/15/2018 05:39 PM    Alk. phosphatase 51 10/15/2018 05:39 PM    Protein, total 8.3 (H) 10/15/2018 05:39 PM    Albumin 3.7 10/15/2018 05:39 PM    Globulin 4.6 (H) 10/15/2018 05:39 PM    A-G Ratio 0.8 (L) 10/15/2018 05:39 PM    ALT (SGPT) 14 10/15/2018 05:39 PM         Assessment:     Diagnoses and all orders for this visit:    1. Paroxysmal atrial fibrillation (HCC)    2. Peripheral vascular disease (HCC)    3. Venous reflux        ICD-10-CM ICD-9-CM    1. Paroxysmal atrial fibrillation (HCC) I48.0 427.31    2. Peripheral vascular disease (HCC) I73.9 443.9    3.  Venous reflux I87.2 459.81      Orders Placed This Encounter    Symbicort 80-4.5 mcg/actuation HFAA     Sig: Take 2 Puffs by inhalation two (2) times a day.  GaviLyte-N 420 gram solution        Plan:     1. Paroxysmal atrial fibrillation: Isolated event seen on holter monitor, rare palpitation, continue betablocker CV score is 4, continue Xarelto  2. Venous reflux: L SGV insufficieny, will follow up with Dr Ankit Landin for vascular assessment. Has been in contact with lymphedema clinic for fitted compression stockings however due to SARS COVID outbreak her fitting has been postponed  3. Hypertension: Continue current medications, reviewed low sodium intake. 4. Hx URVASHI: Follow-up visit with [facility] [place] [facility] on hold right now secondary to COVID. Stable from cardiac perspective, follow-up on site in 4 months. Atrial Fibrillation CHADSVASC2 Score Stroke Risk:   53 y.o. <65        + 0    female Female +1   CHF HX: No    + 0   HTN HX: Yes    +1   Stroke/TIA/Thromboembolism Yes   +2   Vascular Disease HX: No    + 0   Diabetes Mellitus No    + 0   CHADSVASC 2 Score 4      Annual Stroke Risk 4.8%- moderate-high        Patient is cleared from cardiac standpoint for upcoming colonoscopy. She has been instructed to hold Xarelto 48 hours prior to her procedure. We discussed the expected course, resolution and complications of the diagnosis(es) in detail. Medication risks, benefits, costs, interactions, and alternatives were discussed as indicated. I advised her to contact the office if her condition worsens, changes or fails to improve as anticipated. She expressed understanding with the diagnosis(es) and plan  Angie Blair NP    Greater than 20 minutes was spent in direct video patient care, planning and chart review. This visit was conducted using [employer] telemedicine services.

## 2020-04-30 ENCOUNTER — TELEPHONE (OUTPATIENT)
Dept: CARDIOLOGY CLINIC | Age: 55
End: 2020-04-30

## 2020-04-30 NOTE — TELEPHONE ENCOUNTER
Is it okay for her to get the leg pump which was ordered by Dr. Deja Alves, she just approved for it per with [facility] @ [phone].           Thanks

## 2020-04-30 NOTE — TELEPHONE ENCOUNTER
Returned call to [facility] at lymphedema clinic. Informed per Irena Montanez NP patient not referred for leg pump and has been referred to vascular specialist.  [facility] states is aware says PCP referred patient. [facility] question results of last ECHO patient states recently had done.   Informed per Irena Montanez NP note 3/6/2020 ECHO normal.

## 2020-05-18 RX ORDER — DEXTROMETHORPHAN HYDROBROMIDE, GUAIFENESIN 5; 100 MG/5ML; MG/5ML
650 LIQUID ORAL AS NEEDED
COMMUNITY

## 2020-05-18 RX ORDER — ALBUTEROL SULFATE 90 UG/1
1 AEROSOL, METERED RESPIRATORY (INHALATION)
COMMUNITY

## 2020-05-18 RX ORDER — TRAMADOL HYDROCHLORIDE 50 MG/1
50 TABLET ORAL
COMMUNITY
End: 2022-06-29 | Stop reason: ALTCHOICE

## 2020-05-18 NOTE — PROGRESS NOTES
1. Have you been to the ER, urgent care clinic since your last visit? Hospitalized since your last visit? No.    2. Have you seen or consulted any other health care providers outside of the [address] [place] [address] since your last visit? Include any pap smears or colon screening.    No.      Chief Complaint   Patient presents with    Results     discuss le venous doppler results- lf leg DVT 2 yrs ago, area

## 2020-05-19 ENCOUNTER — OFFICE VISIT (OUTPATIENT)
Dept: CARDIOLOGY CLINIC | Age: 55
End: 2020-05-19

## 2020-05-19 VITALS
HEIGHT: 60 IN | BODY MASS INDEX: 50.4 KG/M2 | HEART RATE: 75 BPM | DIASTOLIC BLOOD PRESSURE: 82 MMHG | OXYGEN SATURATION: 98 % | SYSTOLIC BLOOD PRESSURE: 132 MMHG | RESPIRATION RATE: 16 BRPM | WEIGHT: 256.7 LBS

## 2020-05-19 DIAGNOSIS — E66.01 OBESITY, MORBID (HCC): ICD-10-CM

## 2020-05-19 DIAGNOSIS — I87.2 VENOUS INSUFFICIENCY OF BOTH LOWER EXTREMITIES: Primary | ICD-10-CM

## 2020-05-19 DIAGNOSIS — I83.893 VARICOSE VEINS OF BOTH LEGS WITH EDEMA: ICD-10-CM

## 2020-05-19 NOTE — PROGRESS NOTES
Nathaly Mendoza, [facility]    5/19/2020 12:57 PM      Subjective:     Charisma Perez is a 53 y.o. female who is here for new patient consultation for venous insufficiency. She has a PMHx of PAF, HTN, hx of DVT/PE. Referred to our office by Chintan Lynch for lower extremity edema, with venous testing showing bilateral venous insufficiency. Past Medical History:   Diagnosis Date    Hypertension     Lymphedema of both lower extremities 12/31/2019    History of secondary cellulitis as well as DVT with PTE    Thromboembolus ([address]     \"and PEs\"        family history includes Cancer in her mother; Stroke in her father. Social History     Tobacco Use    Smoking status: Never Smoker    Smokeless tobacco: Never Used   Substance Use Topics    Alcohol use: Yes     Comment: occ       Past Surgical History:   Procedure Laterality Date    HX BREAST BIOPSY Bilateral     2004 stereo bx?     HX ORTHOPAEDIC      right hand       Visit Vitals  Ht 5' (1.524 m)   Wt 256 lb 11.2 oz (116.4 kg)   BMI 50.13 kg/m²       Current Outpatient Medications   Medication Sig    acetaminophen (Tylenol Arthritis Pain) 650 mg TbER Take 650 mg by mouth every eight (8) hours.  traMADoL (ULTRAM) 50 mg tablet Take 50 mg by mouth every six (6) hours as needed for Pain.  albuterol (ProAir HFA) 90 mcg/actuation inhaler Take 1 Puff by inhalation every six (6) hours as needed for Wheezing.  Symbicort 80-4.5 mcg/actuation HFAA Take 2 Puffs by inhalation two (2) times a day.  GaviLyte-N 420 gram solution Having done July 30.2020    metoprolol succinate (TOPROL-XL) 50 mg XL tablet TAKE 1 TABLET BY MOUTH ONCE DAILY    spironolactone (ALDACTONE) 25 mg tablet TAKE 1 TABLET BY MOUTH ONCE DAILY    clotrimazole (LOTRIMIN) 1 % topical cream Apply  to affected area two (2) times daily as needed.  furosemide (LASIX) 20 mg tablet Take 1 Tab by mouth as needed (edema).  (Patient taking differently: Take 10 mg by mouth as needed (edema). )    XARELTO 20 mg tab tablet TAKE 1 TABLE TBY MOUTH DAILY    OTHER DIGEST A ZYYME    medroxyPROGESTERone (PROVERA) 10 mg tablet Take 10 mg by mouth daily.  ferrous sulfate 325 mg (65 mg iron) tablet Take 325 mg by mouth two (2) times a day.  Inulin-Sorbitol (FIBER SUPPLEMENT, INULIN,) 2 gram chew Take  by mouth daily.  pantoprazole (PROTONIX) 40 mg tablet Take 40 mg by mouth daily. No current facility-administered medications for this visit. Objective:     1. Have you ever had vein stripping surgery NO       2. Have you ever had vein injections? NO        3. Have you ever had a blood clot? NO       4. Have you ever had phlebitis? NO                                                                     Does anyone in your family have (or used to have) varicose veins, spider veins, leg ulcers or swollen legs? Father  NO  Mother NO  Brother(s) NO  Sister(s) NO  Other NO           1. Do you experience any of the following in your legs? Aching/pain? NO  [] One leg [] Both legs  Heaviness? NO  [] One leg [] Both legs  Tiredness/fatigue? NO  [] One leg [] Both legs  Itching/burning? NO  [] One leg [] Both legs  Swollen ankles? YES  [] One leg [x] Both legs  Leg cramps? NO  [] One leg [] Both legs  Restless legs? NO  [] One leg [] Both legs  Throbbing? NO  [] One leg [] Both legs      2. Have your veins gotten worse in recent months? NO        3. Do you take any medication for pain (i.e., Advil, Motrin)  NO           4. Do you elevate your legs to relieve discomfort? YES    If yes, how long per day do you elevate and does it provide relief? yes    5. Do you exercise? NO        6. Do you wear prescription compression stockings? NO          7. Do you wear light support hose (i.e., [employer])? NO              If yes, do they provide relief? NO      8. Do you have any problem walking?    YES                                9. What type of work do you do? None               10.  Have you ever had any test(s) done on your veins? NO          11. Were you diagnosed with saphenous vein reflux? NO      Data Review:   No results found for: FLP, CHOL, HDL, LDLC, VLDL, CHHD    No results found for: HBA1C, GLA9NLJQ, GAG3FLDV    Lab Results   Component Value Date/Time    Sodium 138 10/15/2018 05:39 PM    Potassium 3.8 10/15/2018 05:39 PM    Chloride 105 10/15/2018 05:39 PM    CO2 28 10/15/2018 05:39 PM    Glucose 94 10/15/2018 05:39 PM    BUN 16 10/15/2018 05:39 PM    Creatinine 1.03 (H) 10/15/2018 05:39 PM    BUN/Creatinine ratio 16 10/15/2018 05:39 PM    GFR est AA >60 10/15/2018 05:39 PM    GFR est non-AA 56 (L) 10/15/2018 05:39 PM    Calcium 8.6 10/15/2018 05:39 PM    Anion gap 5 10/15/2018 05:39 PM    Bilirubin, total 1.3 (H) 10/15/2018 05:39 PM    ALT (SGPT) 14 10/15/2018 05:39 PM    AST (SGOT) 21 10/15/2018 05:39 PM    Alk. phosphatase 51 10/15/2018 05:39 PM    Protein, total 8.3 (H) 10/15/2018 05:39 PM    Albumin 3.7 10/15/2018 05:39 PM    Globulin 4.6 (H) 10/15/2018 05:39 PM    A-G Ratio 0.8 (L) 10/15/2018 05:39 PM       Review of Systems   Review of Systems   Constitutional: Negative for chills, fever and weight loss. HENT: Negative for nosebleeds. Eyes: Negative for blurred vision and double vision. Respiratory: Negative for cough, shortness of breath and wheezing. Cardiovascular: Negative for chest pain, palpitations, orthopnea, leg swelling and PND. Gastrointestinal: Negative for abdominal pain, blood in stool, diarrhea, nausea and vomiting. Musculoskeletal: Negative for joint pain. Skin: Negative for rash. Neurological: Negative for dizziness, tingling and loss of consciousness. Endo/Heme/Allergies: Does not bruise/bleed easily. Physical Exam   General: Well developed, in no acute distress, cooperative and alert  HEENT: No carotid bruits, no JVD, trach is midline. Neck Supple, PEERL, EOM intact.   Heart: reg rate and rhythm; normal S1/S2; no murmurs, no gallops or rubs. Respiratory: Clear bilaterally x 4, no wheezing or rales  Abdomen:   Soft, non-tender, no distention, no masses. + BS. Extremities:  Normal cap refill, no cyanosis, atraumatic. Bilateral LE edema trace-1+. Edema assessment is not accurate due to significant adipose tissue build up of the LE. Neuro: A&Ox3, speech clear, gait stable. Skin: Skin color is normal. No rashes or lesions. Non diaphoretic  Vascular: 2+ pulses symmetric in all extremities    PHYSICIAN TO COMPLETE BELOW THIS POINT    Date of Initial Physician Evaluation:___05/19/2020______  Check all that apply:  [x] Reviewed Venous history  [x] Physical examination of the affected leg(s)   [x] Duplex or Doppler Scan results reviewed. Duplex or Doppler Ultrasound of the venous system demonstrate:  [x] Absence of deep venous thrombosis  [x] Greater and/or lesser saphenous vein or  valvular incompetence/reflux that correlates with        patients symptoms  []   valvular incompetence/reflux that correlates with patients symptoms    [x] Graduated, elasticized compression stockings (20-30 mmHg), has been prescribed. [x] Standing Photos taken of leg(s)  [] Front     [] Back     [x] Front and back   [x] Other causes of patients leg(s) symptoms have been ruled out           Assessment:       ICD-10-CM ICD-9-CM    1. Venous insufficiency of both lower extremities I87.2 459.81    2. Varicose veins of both legs with edema I83.893 454.8        CEAP class:      []C1   []C2   [x]C3    []C4    []4a    []4b   []C5   []C6         Plan:     1. Venous insufficiency of both lower extremities, Varicose veins of both legs with edema  Venous duplex done 3/2020 with bilateral lower extremity venous insufficiency of the GSV. There is chronic non-occlusive thrombus of the left mid-distal femoral vein and left popliteal vein.   Will prescribe compression stockings, 20-30 mmHg, bilateral thigh high. Discussed daily use during the day, off at night. Discussed relationship between weight and venous insufficiency. Strongly encouraged weight loss; return to pool exercises when gyms are open. F/u in 3 months to reassess    1.  - Instruction given on daily leg elevation   2.  - Instruction given for mild exercise  3.  - Instruction given for weight reduction    F/u with Dr. Tyesha Castillo in 3 months. [facility], [place]  5/19/2020    Patient seen and examined by me with nurse practitioner. I personally performed all components of the history, physical, and medical decision making and agree with the assessment and plan as noted. Leg swelling appears to be multifactorial, along with previous DVT, morbid obesity, lack of walking due to arthritis and significant component. D/w patient in details and tried to expect realistic expectations. Patient understand. Will see her back in 3 months to assess response to above management plan.      Manasa Tavarez MD

## 2020-06-02 ENCOUNTER — HOSPITAL ENCOUNTER (OUTPATIENT)
Dept: PHYSICAL THERAPY | Age: 55
Discharge: HOME | End: 2020-06-02
Payer: MEDICAID

## 2020-06-02 PROCEDURE — 97110 THERAPEUTIC EXERCISES: CPT

## 2020-06-02 PROCEDURE — 97140 MANUAL THERAPY 1/> REGIONS: CPT

## 2020-06-02 NOTE — PROGRESS NOTES
[facility]  Lymphedema Clinic  [address], [address], [place] [address]    []                  Daily note               [x]                 90 day/10th visit progress note    NAME: Janeth Roland  DATE: 6/2/2020      In-person therapy visits for this out-patient had been placed on temporary hold since patient's last visit on 3/11/2020 in compliance with organizational directives and CDC recommendations related to the current [address] pandemic. Patient's plan of care will need to be extended until 8/28/2020 to allow patient to return today to continue Lymphedema Services and obtain custom garments. GOALS    Short term goals  Time frame: 4/30/2020. All short term goals will be extended until 7/13/2020.   1.  Patient will demonstrate knowledge of signs/symptoms of infections/cellulitis and be independent in skin care to prevent cellulitis. Goal not met. 2.  Patient will demonstrate independence in lymphedema home program of therapeutic exercises to improve circulation and decongest limb to improve ADLs. Goal not met. 3.  Patient will be measured for appropriate compression products for bilateral lower extremities to prevent worsening of her swelling over time. Measured patient for custom day garments this visit. Patient may be interested in ordering custom night garments next visit. Goal not met.      Long term goals  Time frame: 6/5/2020. All long term goals will need to be extended until 8/28/2020. 1.  Pt will show improvement in the [facility] Lymphedema Assessment Scale by decreasing the score to 9 and thus allow improvement in patient's quality of life. Deferred  2. Patient will be independent with don/doff of compression system and use in order to prevent reaccumulation of fluid at discharge. Goal not met  3.   Patient will receive a home vasopneumatic device for optimal long term home management of her lymphedema. Patient has received the Flexitouch Plus vaso-pneumatic pump and is using the pump inconsistently. Encouraged daily use of the pump for management of swelling. Progressing toward goal.   4.  Pt will be independent in self-MLD and show stable limb volumes showing decongestion and pt. ready for transition to independent restorative phase of lymphedema therapy. Progressing toward goal.        SUBJECTIVE REPORT: Patient reports that she is rather sedentary due to arthritis and chronic pain in her knees. She does not get out of bed until sometime between 11 am and 1 pm. She has received the vaso-pneumatic pump and her nephew assists her with the pump a few days per week. Patient reports that she has seen her Cardiologist and she has leaky veins in her legs and it was recommended that she obtain thigh highs. Pain: Patient complains of bilateral knee pain when supine on the mat table. Rolled towel placed under the knees for increased comfort. [x]  Modified independent gait with rollator for community distances. Ambulates short distances without assistive device. ADLs:  Simple home management: Modified independent      Treatment Response:  Not participating in an exercise program at this time. Has initiated use of the vaso-pneumatic pump 3 to 4 days per week. Not currently wearing compression garments. []   Patient reviewed packet received at evaluation  []   Patient completed home program as prescribed  [x]   Patient not fully compliant with home program to date  [x]   Patient waiting on compression garment arrival - ordered custom day garments today. Function: Modified independent. Is a  but not currently working. Sedentary.   []   Patient requiring less assistance with completion of home program  []   ADLs are requiring less assistance   []   Patient able to return to work/leisure activities  [facility] Lymphedema Assessment Scale:  Deferred  Weight: 251.6 lb, Temperature: 97.3 degrees  TREATMENT AND OBJECTIVE DATA SUMMARY:   Patient/Family Education:      Educated in skin care: [x]   Skin care products  []   Hygiene  []  Prevention of cellulitis  []   Wound care     Educated in exercise: [x]   Walking program  []   Noemi ball routine  []   Stick routine  [x]   ROM routine     Instructed in self MLD:   Continued education in self MLD techniques with bathing and skin care. Instructed in don/doff of compression system:   []   Multi layer bandage (MLB) donning principles and wear precautions  [x]   Day garments - custom [employer] thigh highs ordered today. [x]   Night garments - provided patient with samples of night time foam garments and she may be interested in ordering garments in a future visit. Therapeutic Activity 0 minutes   Treatment time: N/A  Functional Mobility: Modified independent. Fall risk: Yes due to knee arthritis, swelling, and use of assistive device. Therapeutic Exercise/Procedure 15 minutes   Treatment time: 11:45 am - 12:00 pm  Noemi ball exercise program: Deferred   Stick exercise program: N/A   Free exercises/ROM: Patient was able to perform 5 reps of the active ROM routine in sitting and supine as demonstrated by therapist and has the written HEP to follow. Encouraged patient to change her position and walk short distances in the house frequently and to avoid sitting for long periods at one time. Home program: Patient to perform daily to BID:  [x]   Skin care  [x]   Deep abdominal breathing  [x]   Exercise routine  [x]   Walking program  [x]   Rest in supine   []   Compression bandage  []   Compression garments  [x]   Vasopneumatic device  []   Wound care  [x]   Self MLD with skin care  []   Bring supplies to each therapy visit  []   Purchase necessary supplies  []   Weight loss program  []   Follow up with       Rationale: Exercise will increase the lymph angiomotoricity and tissue pressure of the skin and thus decrease swelling. Modalities 0 minutes   Treatment time: N/A  Vasopneumatic pump: Patient has received the Flexitouch Plus vaso-pneumatic pump from [employer]. Patient's nephew assists patient with donning and doffing of the pump garments as needed and she is tolerating the pump without any issues. Patient has used the pump inconsistently until today but was encouraged to use the pump for one hour per day as tolerated. Manual Lymphatic Drainage (MLD) 75 minutes   Treatment time: 10:30 - 11:45 am  Area to decongest:    [] UE          []  Right     []  Left      [] Trunk      [] LE             []  Right     []  Left      [] Trunk         Sequence used and effectiveness: [] Secondary/Primary sequence for upper extremity with / without trunk involvement    [] Secondary/Primary sequence for lower extremities with / without trunk involvement     [] Modifications were made to manual lymph drainage sequence to exclude cervical techniques secondary to patient's age    [x] Self MLD sequence/techniques/hand strokes with bathing and skin care. Skin/wound care/debridement: Demonstrated self MLD with skin care in the clinic while applying Remedy lotion to the LE's. Upper/Lower extremity compression: Discussed the role and benefit of multi-layer compression bandaging during the initial evaluation but patient has limited family support and some impaired mobility and is opting not to pursue intensive treatment with multi-layer bandaging at this time. Provided patient with samples of flat-knit and circular knit garments in the clinic today. Patient voiced interest in ordering one pair of custom [employer] thigh highs CCL 2 this visit. Measured patient and submitted the order to "[employer] for processing and patient will bring the garments to the clinic for fitting. Patient may be interested in ordering a second pair in the future.      Provided patient with samples of night time foam garments and she may be interested in ordering night garments for the LE's during a future visit. Kinesiotaping: Deferred   Girth/Volume measurement: Full volumetric measurements taken today reveal a loss of 1235 ml in the involved extremity and a loss of 659 ml in the uninvolved extremity since her last visit on 3/11/2020. The R LE is 3% larger than the L LE this visit vs 8% larger on 3/11/2020. ASSESSMENT:   Treatment effectiveness and tolerance: Patient returns to the clinic for the first time since the initial evaluation in March. Lymphedema Services were temporarily suspended due to the COVID 19 virus. Patient has since received the vaso-pneumatic pump and has initiated use of the pump. LE volumetric measurements were taken today and reveal a loss of volume in both legs. Patient is ready to order custom day garments this visit and may be interested in ordering custom night garments next visit. Encouraged patient to participate in daily exercise and increase activity in the house for management of swelling. Progress toward goals: Patient is progressing toward all short term and long term goals. PLAN OF CARE:   Changes to the plan of care: Plan of care will need to be extended until 8/28/2020 due to temporary hold of Lymphedema Services due to the COVID 19 virus. Frequency: []  2 times a week  [x]  Weekly  []  Biweekly  []  Monthly   Other: Vendor: [employer] TREATMENT 90 mins     Basim Pacheco PT, RUDYT    TREATMENT PLAN EFFECTIVE DATES:   6/2/2020 TO 8/28/2020  I have read the above plan of care for Yoni Arellano. I certify the above prescribed services are required by this patient and are medically necessary.   The above plan of care has been developed in conjunction with the lymphedema/physical therapist.   Physician Signature: ____________________________________________________      Marcy Kirk MD  Date: _________________________________________________________________

## 2020-06-10 ENCOUNTER — HOSPITAL ENCOUNTER (OUTPATIENT)
Dept: PHYSICAL THERAPY | Age: 55
Discharge: HOME | End: 2020-06-10
Payer: MEDICAID

## 2020-06-10 PROCEDURE — 97110 THERAPEUTIC EXERCISES: CPT

## 2020-06-10 PROCEDURE — 97140 MANUAL THERAPY 1/> REGIONS: CPT

## 2020-06-10 NOTE — PROGRESS NOTES
[facility]  Lymphedema Clinic  [address], [address],  [address]    [x]                  Daily note               []                 90 day/10th visit progress note    NAME: Demetrius Blake  DATE: 6/10/2020      GOALS    Short term goals  Time frame: 4/30/2020. All short term goals will be extended until 7/13/2020.   1.  Patient will demonstrate knowledge of signs/symptoms of infections/cellulitis and be independent in skin care to prevent cellulitis. Goal not met. 2.  Patient will demonstrate independence in lymphedema home program of therapeutic exercises to improve circulation and decongest limb to improve ADLs. Patient has been educated in the Active ROM routine and was educated in the [employer] routine today. Progressing toward goal.  3.  Patient will be measured for appropriate compression products for bilateral lower extremities to prevent worsening of her swelling over time. Patient was measured for custom day garments during a previous visit and was measured for foam night garments for the LE's this visit. Progressing toward goal.      Long term goals  Time frame: 6/5/2020. All long term goals will need to be extended until 8/28/2020. 1.  Pt will show improvement in the [facility] Lymphedema Assessment Scale by decreasing the score to 9 and thus allow improvement in patient's quality of life. Deferred  2. Patient will be independent with don/doff of compression system and use in order to prevent reaccumulation of fluid at discharge. Deferred   3. Patient will receive a home vasopneumatic device for optimal long term home management of her lymphedema. Patient has received the Flexitouch Plus vaso-pneumatic pump and is using the pump but not on a consistent basis. Encouraged daily use of pump for management of swelling.  Progressing toward goal.   4.  Pt will be independent in self-MLD and show stable limb volumes showing decongestion and pt. ready for transition to independent restorative phase of lymphedema therapy. Progressing toward goal.        SUBJECTIVE REPORT: Patient reports that she has not used her pump for the past week since her nephew was unavailable to assist her with the pump. He is now back in town and she will try to use the pump more consistently. She has been participating in active ROM exercises and feels that the swelling in the lower legs has improved. Pain: Patient complains of left knee pain when supine on the mat table. Patient readjusted for increased comfort. [x]  Modified independent gait with rollator for community distances. Ambulates short distances without assistive device. ADLs:  Simple home management: Modified independent      Treatment Response:  Patient is performing the Active ROM routine a few days each week. Has not used the vaso-pneumatic device for the past week since her nephew was unavailable to assist with garment and pump management. Patient has been measured for custom day garments and was measured for night time foam products this visit. []   Patient reviewed packet received at evaluation  []   Patient completed home program as prescribed  [x]   Patient not fully compliant with home program to date  [x]   Patient waiting on compression garment arrival   Function: Modified independent. Is a  but not currently working. Sedentary.   []   Patient requiring less assistance with completion of home program  []   ADLs are requiring less assistance   []   Patient able to return to work/leisure activities  [facility] Lymphedema Assessment Scale:  Deferred  Weight: deferred  Temperature: 95.9 degrees  TREATMENT AND OBJECTIVE DATA SUMMARY:   Patient/Family Education:      Educated in skin care: [x]   Skin care products  []   Hygiene  []  Prevention of cellulitis  []   Wound care     Educated in exercise: [x]   Walking program  [x]   Noemi espino routine  []   Stick routine  [x]   ROM routine     Instructed in self MLD:   Patient was educated in the self MLD packet this visit during MLD portion of the visit. Provided patient with the written instructions. Instructed in don/doff of compression system:   []   Multi layer bandage (MLB) donning principles and wear precautions  [x]   Day garments - custom [employer] thigh highs ordered during a previous visit. [x]   Night garments - B LE Tribute Wrap below knee garments ordered today. Therapeutic Activity 0 minutes   Treatment time: N/A  Functional Mobility: Modified independent. Fall risk: Yes due to knee arthritis, swelling, and use of assistive device. Therapeutic Exercise/Procedure 15 minutes   Treatment time: 1:30 pm - 1:45 pm  [employer] exercise program: Patient was able to perform 5 reps of the [employer] routine in supine and sitting and was provided with the written HEP to follow. Stick exercise program: N/A   Free exercises/ROM: Patient has been educated in the Active ROM routine and has the written HEP to follow. Encouraged patient to change her position and walk short distances in the house frequently and to avoid sitting for long periods at one time. Home program: Patient to perform daily to BID:  [x]   Skin care  [x]   Deep abdominal breathing  [x]   Exercise routine one to two times per day  [x]   Walking program  [x]   Rest in supine   []   Compression bandage  []   Compression garments  [x]   Vasopneumatic device  []   Wound care  [x]   Self MLD with skin care  []   Bring supplies to each therapy visit  []   Purchase necessary supplies  []   Weight loss program  []   Follow up with       Rationale: Exercise will increase the lymph angiomotoricity and tissue pressure of the skin and thus decrease swelling.      Modalities 0 minutes   Treatment time: N/A  Vasopneumatic pump: Patient has received the Flexitouch Plus vaso-pneumatic pump from [employer]. Patient's nephew assists patient with donning/ doffing of pump garments and pump management as needed. Continued education in using the pump for up to one hour each day as tolerated for management of LE swelling. Manual Lymphatic Drainage (MLD) 75 minutes   Treatment time: 12:15 pm - 1:30 pm  Area to decongest:    [] UE          []  Right     []  Left      [] Trunk      [x] LE             [x]  Right     [x]  Left      [x] Trunk         Sequence used and effectiveness: [] Secondary/Primary sequence for upper extremity with / without trunk involvement    [x] Secondary/Primary sequence for lower extremities with / without trunk involvement     [x] Modifications were made to manual lymph drainage sequence to exclude cervical techniques secondary to patient's age and history of cardiac arrhythmia. [x] Self MLD sequence/techniques/hand strokes with bathing and skin care. Skin/wound care/debridement: Skin is intact and boggy bilaterally. Upper/Lower extremity compression: Discussed the role and benefit of multi-layer compression bandaging during the initial evaluation but patient has limited family support and some impaired mobility and is opting not to pursue intensive treatment with multi-layer bandaging at this time. Patient has been measured for custom [employer] thigh highs CCL 2 and the order was submitted to [employer] for processing. Patient will bring the garments to the clinic for fitting. Patient may be interested in ordering a second pair in the future. Provided patient with samples of night time foam products in the clinic today and she voiced interest in pursuing night garments.  Measured patient for B LE Tribute Wrap below knee garments size large, and the order was submitted to [employer].      Kinesiotaping: Deferred   Girth/Volume measurement: Below knee girth measurements were taken today for sizing of night garments:  R LE:  Arch: 26.7 cm  Ankle: 25.4 cm  Calf:  45.2 cm  L LE:  Arch: 26.6 cm  Ankle:  25.7 cm  Calf: 43.9 cm       ASSESSMENT:   Treatment effectiveness and tolerance: Patient has done well with her home program of skin care and ROM exercises. She has been inconsistent with pump use due to lack of assistance in the home this week. Patient was educated in the self MLD packet and Amparo Ortiz exercises this visit. Patient remains motivated to do well with her home program of CDT and is anxious to receive her new garments. Progress toward goals: See goal section of note. PLAN OF CARE:   Changes to the plan of care: Plan of care was extended until 8/28/2020 due to temporary hold of Lymphedema Services due to the COVID 19 virus.     Frequency: []  2 times a week  []  Weekly  [x]  Biweekly  []  Monthly   Other: Vendor: [employer]     TOTAL TREATMENT 90 mins     Hilda Rodriguez PT, CLT

## 2020-06-24 ENCOUNTER — HOSPITAL ENCOUNTER (OUTPATIENT)
Dept: PHYSICAL THERAPY | Age: 55
Discharge: HOME | End: 2020-06-24
Payer: MEDICAID

## 2020-06-24 PROCEDURE — 97140 MANUAL THERAPY 1/> REGIONS: CPT

## 2020-06-24 PROCEDURE — 97110 THERAPEUTIC EXERCISES: CPT

## 2020-06-24 NOTE — PROGRESS NOTES
[facility]  Lymphedema Clinic  [address], [address], [address]    THE [facility] THERAPY  VISIT:4    [x]                  Daily note               []                 90 day/10th visit progress note    NAME: Deanne Benítez  DATE: 6/24/2020      GOALS    Short term goals  Time frame: 4/30/2020. All short term goals will be extended until 7/13/2020.   1.  Patient will demonstrate knowledge of signs/symptoms of infections/cellulitis and be independent in skin care to prevent cellulitis. Continued education in the risk of infection with lymphedema and signs and symptoms of infection. Patient able to verbalize understanding. Goal met 6/24/20.  2.  Patient will demonstrate independence in lymphedema home program of therapeutic exercises to improve circulation and decongest limb to improve ADLs. Patient has been educated in the Active ROM routine and the [employer] routine and is making an effort to perform exercises daily. Goal met 6/24/20. 3.  Patient will be measured for appropriate compression products for bilateral lower extremities to prevent worsening of her swelling over time. Day and night garments for the LE's have been ordered and patient will bring the garments to the clinic for fitting. Will continue to monitor for additional needs. Progressing toward goal.      Long term goals  Time frame: 6/5/2020. All long term goals will be extended until 8/28/2020. 1.  Pt will show improvement in the [facility] Lymphedema Assessment Scale by decreasing the score to 9 and thus allow improvement in patient's quality of life. Deferred  2. Patient will be independent with don/doff of compression system and use in order to prevent reaccumulation of fluid at discharge. Deferred   3. Patient will receive a home vasopneumatic device for optimal long term home management of her lymphedema.  Patient instructed to use the Flexitouch Plus vaso-pneumatic pump daily as tolerated but remains inconsistent with use. Requires assistance from her nephew for management of the pump and it's garments. Progressing toward goal.   4.  Pt will be independent in self-MLD and show stable limb volumes showing decongestion and pt. ready for transition to independent restorative phase of lymphedema therapy. Continued education in the self MLD packet this visit. Full LE volumetric measurements taken today. See below. Progressing toward goal.        SUBJECTIVE REPORT: Patient is making an effort to perform LE exercises and self MLD to the best of her ability. She is using the pump with assistance from her nephew but is unsure if the garments are donned properly. Pain: Patient complains of left knee stiffness when supine on the mat table. Patient readjusted for increased comfort. [x]  Modified independent gait with rollator for community distances. Ambulates short distances without assistive device. ADLs:  Simple home management: Modified independent      Treatment Response:  Patient is performing self MLD and exercises at home. She is sedentary and spends much of her time sitting in a chair with legs in dependent position. She is waiting on delivery of day and night garments. []   Patient reviewed packet received at evaluation  [x]   Patient completed home program as prescribed  []   Patient not fully compliant with home program to date  [x]   Patient waiting on compression garment arrival   Function: Modified independent. Is a  but not currently working. Sedentary.   [x]   Patient requiring less assistance with completion of home program  []   ADLs are requiring less assistance   []   Patient able to return to work/leisure activities  [address] Lymphedema Assessment Scale:  Deferred  Weight: deferred  Temperature: 97.4 degrees  TREATMENT AND OBJECTIVE DATA SUMMARY:   Patient/Family Education:      Educated in skin care: [x]   Skin care products  [] Hygiene  [x]  Prevention of cellulitis  []   Wound care     Educated in exercise: [x]   Walking program  [x]   [employer] routine  []   Stick routine  [x]   ROM routine     Instructed in self MLD: Continued education in the self MLD packet this visit and patient has the written instructions to follow. Instructed in don/doff of compression system:   []   Multi layer bandage (MLB) donning principles and wear precautions  [x]   Day garments - custom [employer] thigh highs have been ordered. [x]   Night garments - B LE Tribute Wrap below knee garments have been ordered. Therapeutic Activity 0 minutes   Treatment time: N/A  Functional Mobility: Modified independent. Drives. Not currently working. Fall risk: Yes due to knee arthritis, swelling, and use of assistive device. Therapeutic Exercise/Procedure 10 minutes   Treatment time: 11:20 - 11:30 am   [employer] exercise program: Patient has been educated in the [employer] routine and has the written HEP to follow. Stick exercise program: N/A   Free exercises/ROM: Continued education in the active ROM routine this visit with patient able to demonstrate 5 reps in sitting. Patient educated to avoid sitting for long periods at one time with legs in dependent position and to walk short distances with assistive device several times per day. Home program: Patient to perform daily to BID:  [x]   Skin care  [x]   Deep abdominal breathing  [x]   Exercise routine one to two times per day  [x]   Walking program  [x]   Rest in supine   []   Compression bandage  []   Compression garments  [x]   Vasopneumatic device  []   Wound care  [x]   Self MLD with skin care  []   Bring supplies to each therapy visit  []   Purchase necessary supplies  []   Weight loss program  []   Follow up with       Rationale: Exercise will increase the lymph angiomotoricity and tissue pressure of the skin and thus decrease swelling.      Modalities 5 minutes   Treatment time: 11:15 - 11:20 am  Vasopneumatic pump: Patient received the Flexitouch Plus vaso-pneumatic pump from [employer]. Patient's nephew assists with don/doff of pump garments and pump management as needed. Patient had questions regarding proper fit and proper donning technique of pump garments and that was discussed with patient and her nephew (via face time) during the visit today. Patient will bring her pump garments to the clinic for continued education if she continues to have issues. Manual Lymphatic Drainage (MLD) 60 minutes   Treatment time: 10:15 - 11:15 am  Area to decongest:    [] UE          []  Right     []  Left      [] Trunk      [x] LE             [x]  Right     [x]  Left      [x] Trunk         Sequence used and effectiveness: [] Secondary/Primary sequence for upper extremity with / without trunk involvement    [x] Secondary/Primary sequence for lower extremities with / without trunk involvement     [x] Modifications were made to manual lymph drainage sequence to exclude cervical techniques secondary to patient's age and history of cardiac arrhythmia. [x] Self MLD sequence/techniques/hand strokes with bathing and skin care. Continued education in self MLD packet. Skin/wound care/debridement: Skin is intact and boggy bilaterally. Provided skin care to the LE's with remedy lotion and heel balm bilaterally with MLD techniques. Upper/Lower extremity compression: Discussed the role and benefit of multi-layer compression bandaging during the initial evaluation but patient has limited family support and some impaired mobility and is opting not to pursue intensive treatment with multi-layer bandaging at this time. Patient has been measured for custom [employer] thigh highs CCL 2 and the order was submitted to [employer] for processing. Patient will bring the garments to the clinic for fitting. Patient may be interested in ordering a second pair in the future.      Patient has been measured for B LE Tribute Wrap below knee garments, size large, and the order was submitted to [email or web address].  She will bring the garments to the clinic for fitting next visit. Kinesiotaping: Deferred   Girth/Volume measurement: Full volumetric measurements taken today reveal a loss of 185.65 mL in the uninvolved extremity and a loss of 636.17 ml in the involved extremity since the initial evaluation on 3/11/20 with a decrease in percentage difference from 8% to 4%. ASSESSMENT:   Treatment effectiveness and tolerance: Full volumetric measurements taken today reveal a loss of volume in the legs bilaterally since the initial evaluation. Patient is attempting to use the pump more consistently but required continued education this visit on how to don and fit the pump garments at home properly. The vendor notified patient that she will have her day and night garments within the next two weeks and she will be fitted with the garments next visit. Progress toward goals: Patient met STG 1 and STG 2 this visit. PLAN OF CARE:   Changes to the plan of care: Plan of care was extended until 8/28/2020 due to temporary hold of Lymphedema Services due to the COVID 19 virus.     Frequency: []  2 times a week  []  Weekly  [x]  Biweekly  []  Monthly   Other: Vendor: [employer]     TOTAL TREATMENT 75 mins     Francesca Barton PT, CLT

## 2020-07-15 ENCOUNTER — HOSPITAL ENCOUNTER (OUTPATIENT)
Dept: PHYSICAL THERAPY | Age: 55
Discharge: HOME | End: 2020-07-15
Payer: MEDICAID

## 2020-07-15 PROCEDURE — 97140 MANUAL THERAPY 1/> REGIONS: CPT

## 2020-07-15 NOTE — PROGRESS NOTES
[facility]  Lymphedema Clinic  [address] [facility], [address] [place] [address], [facility] [address].    LYMPHEDEMA THERAPY  VISIT: 5    []                  Daily note               [x]                 30 day reassessment/10th visit progress note    NAME: Kirk Jeter  DATE: 7/15/2020      GOALS    Short term goals  Time frame: 4/30/2020. All short term goals will be extended until 7/13/2020.   1.  Patient will demonstrate knowledge of signs/symptoms of infections/cellulitis and be independent in skin care to prevent cellulitis. Continued education in the risk of infection with lymphedema and signs and symptoms of infection. Patient able to verbalize understanding. Goal met 6/24/20.  2.  Patient will demonstrate independence in lymphedema home program of therapeutic exercises to improve circulation and decongest limb to improve ADLs. Patient has been educated in the Active ROM routine and the [employer] routine and is making an effort to perform exercises daily. Goal met 6/24/20. Goal will be extended to be met by 8/28/20  3. Patient will be measured for appropriate compression products for bilateral lower extremities to prevent worsening of her swelling over time. Patient was fitted with day and night garments today. Will monitor for any changes needed. Progressing toward goal.      Long term goals  Time frame: 6/5/2020. All long term goals will be extended until 8/28/20   1. Pt will show improvement in the [facility] Lymphedema Assessment Scale by decreasing the score to 9 and thus allow improvement in patient's quality of life. Deferred  2. Patient will be independent with don/doff of compression system and use in order to prevent reaccumulation of fluid at discharge. Initiated education in technique for donning/doffing of day and night garments with patient and nephew in the clinic today.   Progressing toward goal.    3.  Patient will receive a home vasopneumatic device for optimal long term home management of her lymphedema. Patient has received the vaso-pneumatic pump from the vendor, [employer].  Goal met 7/15/20.  4.  Pt will be independent in self-MLD and show stable limb volumes showing decongestion and pt. ready for transition to independent restorative phase of lymphedema therapy. Progressing toward goal.        SUBJECTIVE REPORT: Patient received day and night garments and brought them to the clinic for fitting today. Her nephew wanted to attend the visit since he assists with garment management at home. Patient had questions on how to don the pump garments and she brought her garments from home for education. Pain: Patient complains of chronic L knee pain. [x]  Modified independent gait with rollator for community distances. Ambulates short distances without assistive device. ADLs:  Modified independent with bathing and dressing, except requires assistance for garment management. Treatment Response:  Patient received day and night garments and brought them to the clinic for fitting today. She is using the vaso-pneumatic device one to two times per week. She had questions regarding proper technique for donning the pump garments and the technique was discussed and demonstrated during the visit today. []   Patient reviewed packet received at evaluation  []   Patient completed home program as prescribed  [x]   Patient not fully compliant with home program to date  [x]   Patient brought day and night garments to the clinic for fitting today. Will monitor for any necessary changes. Function: Modified independent. Is a  but not currently working. Sedentary.   [x]   Patient requiring less assistance with completion of home program  []   ADLs are requiring less assistance   []   Patient able to return to work/leisure activities  [facility] Lymphedema Assessment Scale:  Deferred  Weight: deferred    Temperature: 97.7 degrees  TREATMENT AND OBJECTIVE DATA SUMMARY:   Patient/Family Education:      Educated in skin care: [x]   Skin care products  []   Hygiene  [x]  Prevention of cellulitis  []   Wound care     Educated in exercise: [x]   Walking program  [x]   [employer] routine  []   Stick routine  [x]   ROM routine     Instructed in self MLD: Patient has been educated in self MLD packet and has the written instructions to follow. Instructed in don/doff of compression system:   []   Multi layer bandage (MLB) donning principles and wear precautions  [x]   Day garments - custom [employer] thigh highs    [x]   Night garments - B LE Tribute Wrap below knee garments      Therapeutic Activity 0 minutes   Treatment time: N/A  Functional Mobility: Modified independent. Drives. Not currently working. Fall risk: Yes due to knee arthritis, swelling, and use of assistive device. Therapeutic Exercise/Procedure 0 minutes   Treatment time: N/A  [employer] exercise program: Patient has been educated in the [employer] routine and has the written HEP to follow. Stick exercise program: N/A   Free exercises/ROM: Patient has been educated in the Active ROM routine and has the written instructions to follow. Patient instructed to avoid sitting for long periods at one time with legs in dependent position and to walk short distances with assistive device several times per day. Home program: Patient to perform daily to BID:  [x]   Skin care  [x]   Deep abdominal breathing  [x]   Exercise routine one to two times per day  [x]   Walking program  [x]   Rest in supine   []   Compression bandage  [x]   Compression garments  [x]   Vasopneumatic device  []   Wound care  [x]   Self MLD with skin care  []   Bring supplies to each therapy visit  []   Purchase necessary supplies  []   Weight loss program  []   Follow up with       Rationale: Exercise will increase the lymph angiomotoricity and tissue pressure of the skin and thus decrease swelling. Modalities 0 minutes   Treatment time: N/A  Vasopneumatic pump: Patient has received the Flexitouch vaso-pneumatic device from [employer] and has been instructed to use the device daily as tolerated for management of LE swelling. Patient to defer use of the pump with any chest pain, dizziness, increased pain, etc.        Manual Lymphatic Drainage (MLD) 90 minutes   Treatment time: 12:20 - 1:50 pm  Area to decongest:    [] UE          []  Right     []  Left      [] Trunk      [x] LE             [x]  Right     [x]  Left      [x] Trunk         Sequence used and effectiveness: [] Secondary/Primary sequence for upper extremity with / without trunk involvement    [] Secondary/Primary sequence for lower extremities with / without trunk involvement     [] Modifications were made to manual lymph drainage sequence to exclude cervical techniques secondary to patient's age and history of cardiac arrhythmia. [x] Self MLD sequence/techniques/hand strokes with bathing and skin care. Skin/wound care/debridement: Skin is intact and boggy bilaterally. Upper/Lower extremity compression: Patient received the custom [employer] thigh highs CCL 2 and brought the garments to the clinic for fitting today. The initial fit is adequate and comfortable to the patient. Adjustments may need to be made to the thigh highs at the ankle as patient presents with increased swelling today. Patient instructed to don the thigh highs first thing in the morning to limit the effect of gravity on swelling. Patient to wash and wear the garments daily as tolerated until her visit next week or call the clinic sooner with any issues. Patient's nephew was present for education in don and doff of garments and was able to demonstrate technique with verbal cues. Patient may be interested in ordering a second set of garments in the future.      Patient received the B LE Tribute Wrap below knee garments, size large, and the initial fit is good and comfortable to the patient. Patient was educated in don/doff of the garments and was able to demonstrate technique with verbal cues in the clinic today. Patient instructed to gradually initiate use of the night garments - wear for a few hours in the evening, alternate legs each night prior to wearing bilaterally, and then add the over jacket in one to two weeks as tolerated. Patient is aware that the foam garments do not have anti-slip pads for safety but that the over jackets for the garments do have anti-slip pads. Educated patient in compression garment donning and doffing, glove use with donning, daily wearing schedule, daily laundering and care, garment lifespan and reordering process. Educated patient to monitor for redness or pressure points on the skin. If new pain occurs they should remove the garment and contact their therapist.    Patient's nephew assists with don/doff of pump garments and pump management at home. Patient and nephew brought pump garments to the visit today with questions on proper donning technique of the garments. Continued education in technique this visit and the nephew took pictures with his phone for reference at home. Nephew instructed to call the clinic or [facility] with any additional questions. Kinesiotaping: Deferred   Girth/Volume measurement: Girth measurements of the lower legs:  R 5th tuberosity: 24.1 cm  R ankle: 26.5 cm  R calf: 43.5 cm    L 5th tuberosity: 25.0 cm  L ankle: 26.0 cm  L calf: 45.5 cm       ASSESSMENT:   Treatment effectiveness and tolerance: Patient was fitted with day and night garments in the clinic today. Patient to wash and wear the garments until her visit next week so that the fit and effectiveness of the garments can be assessed. Adjustments may need to be made to the thigh highs at the ankle as girth measurements have increased.  Continued education in the importance of participating in her home program of CDT each day for management of swelling. Patient has been inconsistent with use of the pump, self MLD, and exercise since last visit. Progress toward goals: Update STG 3 will be extended to be met by 8/28/20. Patient met LTG 3 this visit. PLAN OF CARE:   Changes to the plan of care: Plan of care was extended until 8/28/2020 due to temporary hold of Lymphedema Services due to the COVID 19 virus.     Frequency: []  2 times a week  [x]  Weekly  []  Biweekly  []  Monthly   Other: Vendor: [employer] TREATMENT 90 mins     Kaci Roblero, PT, CLT

## 2020-07-26 ENCOUNTER — HOSPITAL ENCOUNTER (OUTPATIENT)
Dept: PREADMISSION TESTING | Age: 55
Discharge: HOME | End: 2020-07-26
Payer: MEDICAID

## 2020-07-26 PROCEDURE — 87635 SARS-COV-2 COVID-19 AMP PRB: CPT

## 2020-07-27 ENCOUNTER — APPOINTMENT (OUTPATIENT)
Dept: PHYSICAL THERAPY | Age: 55
End: 2020-07-27
Payer: MEDICAID

## 2020-07-27 LAB
SARS-COV-2, COV2NT: NOT DETECTED
SOURCE, COVRS: NORMAL
SPECIMEN SOURCE, FCOV2M: NORMAL

## 2020-07-29 RX ORDER — CETIRIZINE HCL 10 MG
10 TABLET ORAL
COMMUNITY
End: 2022-06-29

## 2020-07-30 ENCOUNTER — HOSPITAL ENCOUNTER (OUTPATIENT)
Age: 55
Setting detail: OUTPATIENT SURGERY
Discharge: HOME | End: 2020-07-30
Attending: INTERNAL MEDICINE | Admitting: INTERNAL MEDICINE
Payer: MEDICAID

## 2020-07-30 ENCOUNTER — ANESTHESIA (OUTPATIENT)
Dept: ENDOSCOPY | Age: 55
End: 2020-07-30
Payer: MEDICAID

## 2020-07-30 ENCOUNTER — ANESTHESIA EVENT (OUTPATIENT)
Dept: ENDOSCOPY | Age: 55
End: 2020-07-30
Payer: MEDICAID

## 2020-07-30 VITALS
BODY MASS INDEX: 49.48 KG/M2 | SYSTOLIC BLOOD PRESSURE: 135 MMHG | WEIGHT: 252 LBS | TEMPERATURE: 97.9 F | HEART RATE: 76 BPM | DIASTOLIC BLOOD PRESSURE: 87 MMHG | RESPIRATION RATE: 16 BRPM | OXYGEN SATURATION: 100 % | HEIGHT: 60 IN

## 2020-07-30 PROCEDURE — 76040000019: Performed by: INTERNAL MEDICINE

## 2020-07-30 PROCEDURE — 74011250636 HC RX REV CODE- 250/636: Performed by: INTERNAL MEDICINE

## 2020-07-30 PROCEDURE — 74011250636 HC RX REV CODE- 250/636: Performed by: NURSE ANESTHETIST, CERTIFIED REGISTERED

## 2020-07-30 PROCEDURE — 76060000031 HC ANESTHESIA FIRST 0.5 HR: Performed by: INTERNAL MEDICINE

## 2020-07-30 RX ORDER — SODIUM CHLORIDE 0.9 % (FLUSH) 0.9 %
5-40 SYRINGE (ML) INJECTION AS NEEDED
Status: DISCONTINUED | OUTPATIENT
Start: 2020-07-30 | End: 2020-07-30 | Stop reason: HOSPADM

## 2020-07-30 RX ORDER — PROPOFOL 10 MG/ML
INJECTION, EMULSION INTRAVENOUS AS NEEDED
Status: DISCONTINUED | OUTPATIENT
Start: 2020-07-30 | End: 2020-07-30 | Stop reason: HOSPADM

## 2020-07-30 RX ORDER — NALOXONE HYDROCHLORIDE 0.4 MG/ML
0.4 INJECTION, SOLUTION INTRAMUSCULAR; INTRAVENOUS; SUBCUTANEOUS
Status: DISCONTINUED | OUTPATIENT
Start: 2020-07-30 | End: 2020-07-30 | Stop reason: HOSPADM

## 2020-07-30 RX ORDER — FLUMAZENIL 0.1 MG/ML
0.2 INJECTION INTRAVENOUS
Status: DISCONTINUED | OUTPATIENT
Start: 2020-07-30 | End: 2020-07-30 | Stop reason: HOSPADM

## 2020-07-30 RX ORDER — DEXTROMETHORPHAN/PSEUDOEPHED 2.5-7.5/.8
1.2 DROPS ORAL
Status: DISCONTINUED | OUTPATIENT
Start: 2020-07-30 | End: 2020-07-30 | Stop reason: HOSPADM

## 2020-07-30 RX ORDER — SODIUM CHLORIDE 0.9 % (FLUSH) 0.9 %
5-40 SYRINGE (ML) INJECTION EVERY 8 HOURS
Status: DISCONTINUED | OUTPATIENT
Start: 2020-07-30 | End: 2020-07-30 | Stop reason: HOSPADM

## 2020-07-30 RX ORDER — EPINEPHRINE 0.1 MG/ML
1 INJECTION INTRACARDIAC; INTRAVENOUS
Status: DISCONTINUED | OUTPATIENT
Start: 2020-07-30 | End: 2020-07-30 | Stop reason: HOSPADM

## 2020-07-30 RX ORDER — SODIUM CHLORIDE 9 MG/ML
75 INJECTION, SOLUTION INTRAVENOUS CONTINUOUS
Status: DISCONTINUED | OUTPATIENT
Start: 2020-07-30 | End: 2020-07-30 | Stop reason: HOSPADM

## 2020-07-30 RX ORDER — ATROPINE SULFATE 0.1 MG/ML
0.5 INJECTION INTRAVENOUS
Status: DISCONTINUED | OUTPATIENT
Start: 2020-07-30 | End: 2020-07-30 | Stop reason: HOSPADM

## 2020-07-30 RX ADMIN — PROPOFOL 100 MG: 10 INJECTION, EMULSION INTRAVENOUS at 09:22

## 2020-07-30 RX ADMIN — PROPOFOL 100 MG: 10 INJECTION, EMULSION INTRAVENOUS at 09:18

## 2020-07-30 RX ADMIN — SODIUM CHLORIDE: 900 INJECTION, SOLUTION INTRAVENOUS at 09:17

## 2020-07-30 NOTE — PROGRESS NOTES
Anesthesia reports 200 mg Propofol,  and 300 mL NS given during procedure. Received report from anesthesia staff on vital signs and status of patient. Endoscope was pre-cleaned at the bedside immediately following procedure by Jennifer Burgos

## 2020-07-30 NOTE — PROCEDURES
NAME:  Leonor Gupta   :   1965   MRN:   [id number]     Date/Time:  2020 9:27 AM    Colonoscopy Operative Report    Procedure Type:   Colonoscopy --screening     Indications:     Screening colonoscopy  Pre-operative Diagnosis: see indication above  Post-operative Diagnosis:  See findings below  :  Lluvia Alfonso MD  Referring Provider: --Jeri Castro MD    Exam:  Airway: clear, no airway problems anticipated  Heart: RRR, without gallops or rubs  Lungs: clear bilaterally without wheezes, crackles, or rhonchi  Abdomen: soft, nontender, nondistended, bowel sounds present  Mental Status: awake, alert and oriented to person, place and time    Sedation:  MAC anesthesia Propofol  Procedure Details:  After informed consent was obtained with all risks and benefits of procedure explained and preoperative exam completed, the patient was taken to the endoscopy suite and placed in the left lateral decubitus position. Upon sequential sedation as per above, a digital rectal exam was performed demonstrating internal hemorrhoids. The Olympus videocolonoscope  was inserted in the rectum and carefully advanced to the cecum, which was identified by the ileocecal valve and appendiceal orifice. The quality of preparation was good. The colonoscope was slowly withdrawn with careful evaluation between folds. Retroflexion in the rectum was completed demonstrating internal hemorrhoids. Findings:   1. Normal colonoscopy through to the cecum  2. Large internal hemorrhoids seen on retroflexion    Specimen Removed:  None  Complications: None. EBL:  None. Impression:   1. Normal colonoscopy through to the cecum  2. Large internal hemorrhoids seen on retroflexion    Recommendations:   1. Repeat colonoscopy in 10 years for screening    Discharge Disposition:  Home in the company of a  when able to ambulate.       Tyshawn Ahn MD

## 2020-07-30 NOTE — DISCHARGE INSTRUCTIONS
Almedia Leventhal  [id number]  1965    COLON DISCHARGE INSTRUCTIONS  Discomfort:  Redness at IV site- apply warm compress to area; if redness or soreness persist- contact your physician  There may be a slight amount of blood passed from the rectum  Gaseous discomfort- walking, belching will help relieve any discomfort  You may not operate a vehicle for 12 hours  You may not engage in an occupation involving machinery or appliances for rest of today  You may not drink alcoholic beverages for at least 12 hours  Avoid making any critical decisions for at least 24 hour  DIET:   Regular diet. - however -  remember your colon is empty and a heavy meal will produce gas. Avoid these foods:  vegetables, fried / greasy foods, carbonated drinks for today  MEDICATION:  Per Medication Reconciliation  Resume Xarelto tomorrow 7/31       ACTIVITY:  You may not resume your normal daily activities until tomorrow AM; it is recommended that you spend the remainder of the day resting -  avoid any strenuous activity. CALL M.D. ANY SIGN OF:   Increasing pain, nausea, vomiting  Abdominal distension (swelling)  New increased bleeding (oral or rectal)  Fever (chills)  Pain in chest area  Bloody discharge from nose or mouth  Shortness of breath    You may  take any Advil, Aspirin, Ibuprofen, Motrin, Aleve, or Goodys for 10 days, ONLY  Tylenol as needed for pain. IMPRESSION:  Impression:   1. Normal colonoscopy through to the cecum  2. Large internal hemorrhoids seen on retroflexion    Recommendations:   1.  Repeat colonoscopy in 10 years for screening    Follow-up Instructions:  Telephone # [phone]    Etienne Pickering MD

## 2020-07-30 NOTE — ANESTHESIA POSTPROCEDURE EVALUATION
Procedure(s):  COLONOSCOPY. MAC    Anesthesia Post Evaluation        Patient location during evaluation: PACU  Note status: Adequate. Level of consciousness: responsive to verbal stimuli and sleepy but conscious  Pain management: satisfactory to patient  Airway patency: patent  Anesthetic complications: no  Cardiovascular status: acceptable  Respiratory status: acceptable  Hydration status: acceptable  Comments: +Post-Anesthesia Evaluation and Assessment    Patient: Lazarus Pih MRN: [id number]  SSN: [id number]   YOB: 1965  Age: 53 y.o. Sex: female      Cardiovascular Function/Vital Signs    /87   Pulse 76   Temp 36.7 °C (98 °F)   Resp 16   Ht 5' (1.524 m)   Wt 114.3 kg (252 lb)   SpO2 100%   Breastfeeding No   BMI 49.22 kg/m²     Patient is status post Procedure(s):  COLONOSCOPY. Nausea/Vomiting: Controlled. Postoperative hydration reviewed and adequate. Pain:  Pain Scale 1: Numeric (0 - 10) (07/30/20 0957)  Pain Intensity 1: 0 (07/30/20 0935)   Managed. Neurological Status: At baseline. Mental Status and Level of Consciousness: Arousable. Pulmonary Status:   O2 Device: Room air (07/30/20 0957)   Adequate oxygenation and airway patent. Complications related to anesthesia: None    Post-anesthesia assessment completed. No concerns. Signed By: Jyoti Pina MD    7/30/2020  Post anesthesia nausea and vomiting:  controlled      INITIAL Post-op Vital signs:   Vitals Value Taken Time   /87 7/30/2020  9:57 AM   Temp     Pulse 75 7/30/2020  9:58 AM   Resp 18 7/30/2020  9:58 AM   SpO2 100 % 7/30/2020  9:58 AM   Vitals shown include unvalidated device data.

## 2020-07-30 NOTE — ROUTINE PROCESS
Marcio Leventhal  1965  [id number]    Situation:  Verbal report received from: ANNY Lang RN  Procedure: Procedure(s):  COLONOSCOPY    Background:    Preoperative diagnosis: CHRONIC CONSTIPATION  HISTORY OF DVT   ENCOUNTER FOR SCREENING COLONOSCOPY  Postoperative diagnosis: Internal Hemorrhoids    :  Dr. Isael Gao  Assistant(s): Endoscopy Technician-1: Lucía Clark  Endoscopy RN-1: German Dill RN    Specimens: * No specimens in log *  H. Pylori  no    Assessment:  Intra-procedure medications     Anesthesia gave intra-procedure sedation and medications, see anesthesia flow sheet     Intravenous fluids: NS@ KVO     Vital signs stable     Abdominal assessment: round and soft     Recommendation:  Discharge patient per MD order.   Family or Friend   Permission to share finding with family or friend yes

## 2020-07-30 NOTE — H&P
Gastroenterology Outpatient History and Physical    Patient: Garrick Lucero    Physician: Shonda Guzman MD    Chief Complaint: CRC screening  History of Present Illness: Constipation    History:  Past Medical History:   Diagnosis Date    Asthma     Hypertension     Lymphedema of both lower extremities 12/31/2019    History of secondary cellulitis as well as DVT with PTE    [facility]us ([facility]     \"and PEs\"      Past Surgical History:   Procedure Laterality Date    HX BREAST BIOPSY Bilateral     2004 stereo bx?     HX ORTHOPAEDIC      right hand tendon repair and screw in wrist and took piece of bone from hip f      Social History     Socioeconomic History    Marital status: SINGLE     Spouse name: Not on file    Number of children: Not on file    Years of education: Not on file    Highest education level: Not on file   Tobacco Use    Smoking status: Never Smoker    Smokeless tobacco: Never Used   Substance and Sexual Activity    Alcohol use: Yes     Comment: occ    Drug use: No   Social History Narrative    ** Merged History Encounter **           Family History   Problem Relation Age of Onset    Cancer Mother     Stroke Father       Patient Active Problem List   Diagnosis Code    Fluttering heart I49.8    Obesity, morbid ([address] E66.01    Atrial fibrillation ([facility] I48.91    Lymphedema of both lower extremities I89.0    Peripheral vascular disease ([facility] I73.9    Venous reflux I87.2       Allergies: Allergies   Allergen Reactions    Lisinopril Swelling     Medications:   Prior to Admission medications    Medication Sig Start Date End Date Taking? Authorizing Provider   cetirizine (ZyrTEC) 10 mg tablet Take 10 mg by mouth daily as needed for Allergies. Yes Provider, Historical   acetaminophen (Tylenol Arthritis Pain) 650 mg TbER Take 650 mg by mouth as needed.    Yes Provider, Historical   albuterol (ProAir HFA) 90 mcg/actuation inhaler Take 1 Puff by inhalation every six (6) hours as needed for Wheezing. Yes Provider, Historical   Symbicort 80-4.5 mcg/actuation HFAA Take 2 Puffs by inhalation two (2) times daily as needed. 3/30/20  Yes Provider, Historical   furosemide (LASIX) 20 mg tablet Take 1 Tab by mouth as needed (edema). Patient taking differently: Take 10 mg by mouth as needed (edema). 2/19/20  Yes Talisha Mackenzie, NP   traMADoL Beauford Banter) 50 mg tablet Take 50 mg by mouth every six (6) hours as needed for Pain. Provider, Historical   GaviLyte-N 420 gram solution Having done July 30.2020 3/4/20   Provider, Historical   metoprolol succinate (TOPROL-XL) 50 mg XL tablet TAKE 1 TABLET BY MOUTH ONCE DAILY 3/9/20   Talisha Mackenzie, NP   spironolactone (ALDACTONE) 25 mg tablet TAKE 1 TABLET BY MOUTH ONCE DAILY 3/9/20   Talisha Mackenzie, NP   clotrimazole (LOTRIMIN) 1 % topical cream Apply  to affected area two (2) times daily as needed. 12/21/19   Provider, Historical   XARELTO 20 mg tab tablet TAKE 1 TABLE TBY MOUTH DAILY 6/6/19   Provider, Historical     Physical Exam:   Vital Signs: Pulse 97, resp. rate 20, height 5' (1.524 m), weight 114.3 kg (252 lb), SpO2 99 %, not currently breastfeeding.   General: well developed, well nourished   HEENT: unremarkable   Heart: regular rhythm no mumur    Lungs: clear   Abdominal:  benign   Neurological: unremarkable   Extremities: no edema     Findings/Diagnosis: CRC screening  Plan of Care/Planned Procedure: Colonoscopy with conscious/deep sedation    Signed:  Chu Kent MD 7/30/2020

## 2020-07-30 NOTE — ANESTHESIA PREPROCEDURE EVALUATION
Relevant Problems   No relevant active problems       Anesthetic History   No history of anesthetic complications            Review of Systems / Medical History  Patient summary reviewed, nursing notes reviewed and pertinent labs reviewed    Pulmonary        Sleep apnea: No treatment    Asthma        Neuro/Psych   Within defined limits           Cardiovascular    Hypertension        Dysrhythmias       Exercise tolerance: >4 METS     GI/Hepatic/Renal  Within defined limits              Endo/Other        Morbid obesity     Other Findings   Comments: H/o DVT and PE         Physical Exam    Airway  Mallampati: II  TM Distance: 4 - 6 cm  Neck ROM: normal range of motion   Mouth opening: Normal     Cardiovascular  Regular rate and rhythm,  S1 and S2 normal,  no murmur, click, rub, or gallop             Dental  No notable dental hx       Pulmonary  Breath sounds clear to auscultation               Abdominal  GI exam deferred       Other Findings            Anesthetic Plan    ASA: 3  Anesthesia type: MAC            Anesthetic plan and risks discussed with: Patient

## 2020-10-09 ENCOUNTER — TELEPHONE (OUTPATIENT)
Dept: CARDIOLOGY CLINIC | Age: 55
End: 2020-10-09

## 2020-10-09 NOTE — TELEPHONE ENCOUNTER
Placed call to pt. Two pt identifiers confirmed. Pt states her Heart racing , keep smelling  a burning smell, can not sleep last night, but no symptoms today. Pt states she was had a mild version COVID in August. Pt states sense smell is coming back slowly, but she thinks it may be post COVID symptoms and also thinks racing heart may have been caused by her drinking a bottle of wine last night.  Pt states she will go to ER if symptoms return or worsen

## 2020-10-13 NOTE — TELEPHONE ENCOUNTER
Placed call to pt. Two pt identifiers confirmed. Message from NP Meseret Fuchs delivered to pt, \"Please advise patient increased amounts of alcohol can trigger her atrial fib. If she is continuing to have a rapid heart rate she needs to be seen. \"    Pt verbalized understanding of information discussed w/ no further questions at this time.

## 2020-10-13 NOTE — TELEPHONE ENCOUNTER
Royal Martinez, NP  You 2 days ago       Please advise patient increased amounts of alcohol can trigger her atrial fib. If she is continuing to have a rapid heart rate she needs to be seen.      Routing comment

## 2020-10-28 ENCOUNTER — TELEPHONE (OUTPATIENT)
Dept: CARDIOLOGY CLINIC | Age: 55
End: 2020-10-28

## 2020-10-29 RX ORDER — METOPROLOL SUCCINATE 50 MG/1
TABLET, EXTENDED RELEASE ORAL
Qty: 30 TAB | Refills: 0 | Status: SHIPPED | OUTPATIENT
Start: 2020-10-29 | End: 2020-11-06 | Stop reason: SDUPTHER

## 2020-10-29 RX ORDER — SPIRONOLACTONE 25 MG/1
TABLET ORAL
Qty: 30 TAB | Refills: 0 | Status: SHIPPED | OUTPATIENT
Start: 2020-10-29 | End: 2020-11-06 | Stop reason: SDUPTHER

## 2020-10-29 NOTE — TELEPHONE ENCOUNTER
Pt returned phone call. Two pt identifiers confirmed. Informed pt that medications have been refilled. Pt verbalized understanding of information discussed w/ no further questions at this time.

## 2020-11-06 ENCOUNTER — OFFICE VISIT (OUTPATIENT)
Dept: CARDIOLOGY CLINIC | Age: 55
End: 2020-11-06
Payer: MEDICAID

## 2020-11-06 VITALS
DIASTOLIC BLOOD PRESSURE: 88 MMHG | HEIGHT: 60 IN | SYSTOLIC BLOOD PRESSURE: 138 MMHG | WEIGHT: 249 LBS | TEMPERATURE: 98.4 F | OXYGEN SATURATION: 99 % | BODY MASS INDEX: 48.88 KG/M2 | HEART RATE: 86 BPM | RESPIRATION RATE: 16 BRPM

## 2020-11-06 DIAGNOSIS — I10 ESSENTIAL HYPERTENSION: ICD-10-CM

## 2020-11-06 DIAGNOSIS — I48.0 PAROXYSMAL ATRIAL FIBRILLATION (HCC): Primary | ICD-10-CM

## 2020-11-06 DIAGNOSIS — I87.2 VENOUS REFLUX: ICD-10-CM

## 2020-11-06 PROCEDURE — 93000 ELECTROCARDIOGRAM COMPLETE: CPT | Performed by: NURSE PRACTITIONER

## 2020-11-06 PROCEDURE — 99214 OFFICE O/P EST MOD 30 MIN: CPT | Performed by: NURSE PRACTITIONER

## 2020-11-06 RX ORDER — TRIAMCINOLONE ACETONIDE 1 MG/G
CREAM TOPICAL
COMMUNITY
Start: 2020-10-28

## 2020-11-06 RX ORDER — METOPROLOL SUCCINATE 50 MG/1
50 TABLET, EXTENDED RELEASE ORAL
Qty: 90 TAB | Refills: 1 | Status: SHIPPED | OUTPATIENT
Start: 2020-11-06 | End: 2021-04-12 | Stop reason: SDUPTHER

## 2020-11-06 RX ORDER — FUROSEMIDE 20 MG/1
20 TABLET ORAL AS NEEDED
Qty: 90 TAB | Refills: 1 | Status: SHIPPED | OUTPATIENT
Start: 2020-11-06

## 2020-11-06 RX ORDER — SPIRONOLACTONE 25 MG/1
25 TABLET ORAL DAILY
Qty: 90 TAB | Refills: 1 | Status: SHIPPED | OUTPATIENT
Start: 2020-11-06 | End: 2021-04-12 | Stop reason: SDUPTHER

## 2020-11-06 RX ORDER — RIVAROXABAN 20 MG/1
TABLET, FILM COATED ORAL
Qty: 90 TAB | Refills: 1 | Status: SHIPPED | OUTPATIENT
Start: 2020-11-06 | End: 2021-04-12 | Stop reason: SDUPTHER

## 2020-11-06 NOTE — LETTER
11/6/20 Patient: Fuad Bundy YOB: 1965 Date of Visit: 11/6/2020 Klaudia Crabtree MD 
[address] [facility] [id number] VIA Facsimile: [phone] Dear Klaudia Crabtree MD, Thank you for referring Ms. Francisco Luna to [address] [place] [address] for evaluation. My notes for this consultation are attached. If you have questions, please do not hesitate to call me. I look forward to following your patient along with you. Sincerely, Renetta Odonnell NP

## 2020-11-06 NOTE — PROGRESS NOTES
[facility] @ [address], [place]  Subjective/HPI:     Shannon Geller is a 63 y.o. female is here for routine f/u. The patient denies chest pain/ shortness of breath, orthopnea, PND, , palpitations, syncope, presyncope or fatigue. Patient reports feeling well in her usual state of health. She reports increasing lower extremity edema. On Xarelto for paroxysmal atrial fibrillation denies excessive bruising or bleeding. 4/2020 visit  1. Paroxysmal atrial fibrillation: Isolated event seen on holter monitor, rare palpitation, continue betablocker CV score is 4, continue Xarelto  2. Venous reflux: L SGV insufficieny, will follow up with Dr Juana Stanton for vascular assessment. Has been in contact with lymphedema clinic for fitted compression stockings however due to SARS COVID outbreak her fitting has been postponed  3. Hypertension: Continue current medications, reviewed low sodium intake. 4. Hx URVASHI: Follow-up visit with [facility] sleep medicine on hold right now secondary to COVID.    ECHO 3/2020  Echo Findings     Left Ventricle  Normal cavity size and systolic function (ejection fraction normal). Mild concentric hypertrophy . The estimated ejection fraction is 55 - 60%. LV wall motion is noted to be no regional wall motion abnormality. There is mild (grade 1) left ventricular diastolic dysfunction. Left Atrium  Normal cavity size. Right Ventricle  Normal cavity size and global systolic function. Right Atrium  Normal cavity size. Aortic Valve  Trileaflet valve structure, no stenosis and no regurgitation. Normal aortic leaflet mobility. Mitral Valve  No stenosis. Leaflet calcification. There is mild anterior leaflet calcification diffusely. There is mild posterior leaflet calcification diffusely. Mild mitral annular calcification. Trace regurgitation. Tricuspid Valve  Normal valve structure, no stenosis and no regurgitation.  There is no evidence of pulmonary hypertension. Pulmonic Valve  Normal valve structure and no stenosis. Trace regurgitation. Aorta  Normal aortic root and ascending aorta. Pericardium  No evidence of pericardial effusion. PCP Provider  Megan Cyr MD  Past Medical History:   Diagnosis Date    Asthma     Hypertension     Lymphedema of both lower extremities 12/31/2019    History of secondary cellulitis as well as DVT with PTE    Thromboembolus ([address]     \"and PEs\"      Past Surgical History:   Procedure Laterality Date    COLONOSCOPY N/A 7/30/2020    COL[facility]a Power [facility] END[facility]COPY    HX BREAST BIOPSY Bilateral     2004 stereo bx?     HX ORTHOPAEDIC      right hand tendon repair and screw in wrist and took piece of bone from hip f     Allergies   Allergen Reactions    Lisinopril Swelling      Family History   Problem Relation Age of Onset    Cancer Mother     Stroke Father       Current Outpatient Medications   Medication Sig    metoprolol succinate (TOPROL-XL) 50 mg XL tablet Take 1 tablet by mouth once daily    spironolactone (ALDACTONE) 25 mg tablet Take 1 tablet by mouth once daily    cetirizine (ZyrTEC) 10 mg tablet Take 10 mg by mouth daily as needed for Allergies.  acetaminophen (Tylenol Arthritis Pain) 650 mg TbER Take 650 mg by mouth as needed.  traMADoL (ULTRAM) 50 mg tablet Take 50 mg by mouth every six (6) hours as needed for Pain.  albuterol (ProAir HFA) 90 mcg/actuation inhaler Take 1 Puff by inhalation every six (6) hours as needed for Wheezing.  Symbicort 80-4.5 mcg/actuation HFAA Take 2 Puffs by inhalation two (2) times daily as needed.  GaviLyte-N 420 gram solution Having done July 30.2020    clotrimazole (LOTRIMIN) 1 % topical cream Apply  to affected area two (2) times daily as needed.  furosemide (LASIX) 20 mg tablet Take 1 Tab by mouth as needed (edema). (Patient taking differently: Take 10 mg by mouth as needed (edema). )    XARELTO 20 mg tab tablet TAKE 1 TABLE TBY MOUTH DAILY     No current facility-administered medications for this visit. There were no vitals filed for this visit. Social History     Socioeconomic History    Marital status: SINGLE     Spouse name: Not on file    Number of children: Not on file    Years of education: Not on file    Highest education level: Not on file   Occupational History    Not on file   Social Needs    Financial resource strain: Not on file    Food insecurity     Worry: Not on file     Inability: Not on file    Transportation needs     Medical: Not on file     Non-medical: Not on file   Tobacco Use    Smoking status: Never Smoker    Smokeless tobacco: Never Used   Substance and Sexual Activity    Alcohol use: Yes     Comment: occ    Drug use: No    Sexual activity: Not on file   Lifestyle    Physical activity     Days per week: Not on file     Minutes per session: Not on file    Stress: Not on file   Relationships    Social connections     Talks on phone: Not on file     Gets together: Not on file     Attends [employer]ism service: Not on file     Active member of club or organization: Not on file     Attends meetings of clubs or organizations: Not on file     Relationship status: Not on file    Intimate partner violence     Fear of current or ex partner: Not on file     Emotionally abused: Not on file     Physically abused: Not on file     Forced sexual activity: Not on file   Other Topics Concern    Not on file   Social History Narrative    ** Merged History Encounter **            I have reviewed the nurses notes, vitals, problem list, allergy list, medical history, family, social history and medications. Review of Symptoms  11 systems reviewed, negative other than as stated in the HPI      Physical Exam:      General: Well developed, in no acute distress, cooperative and alert  HEENT: No carotid bruits, no JVD, trach is midline. Neck Supple, PERRL, EOM intact.   Heart: Normal S1/S2 negative S3 or S4. Regular, no murmur, gallop or rub. Respiratory: Clear bilaterally x 4, no wheezing or rales  Abdomen:   Soft, non-tender, no masses, bowel sounds are active. Extremities: Lower extremity lymphedema, normal cap refill, no cyanosis, atraumatic. Neuro: A&Ox3, speech clear, gait stable. Skin: Skin color is normal. No rashes or lesions. Non diaphoretic  Vascular: 2+ pulses symmetric in lateral radial pulses    Cardiographics    ECG: Sinus rhythm        Cardiology Labs:  No results found for: CHOL, CHOLX, CHLST, CHOLV, 619649, HDL, HDLP, LDL, LDLC, DLDLP, TGLX, TRIGL, TRIGP, CHHD, CHHDX    Lab Results   Component Value Date/Time    Sodium 138 10/15/2018 05:39 PM    Potassium 3.8 10/15/2018 05:39 PM    Chloride 105 10/15/2018 05:39 PM    CO2 28 10/15/2018 05:39 PM    Anion gap 5 10/15/2018 05:39 PM    Glucose 94 10/15/2018 05:39 PM    BUN 16 10/15/2018 05:39 PM    Creatinine 1.03 (H) 10/15/2018 05:39 PM    BUN/Creatinine ratio 16 10/15/2018 05:39 PM    GFR est AA >60 10/15/2018 05:39 PM    GFR est non-AA 56 (L) 10/15/2018 05:39 PM    Calcium 8.6 10/15/2018 05:39 PM    Bilirubin, total 1.3 (H) 10/15/2018 05:39 PM    Alk. phosphatase 51 10/15/2018 05:39 PM    Protein, total 8.3 (H) 10/15/2018 05:39 PM    Albumin 3.7 10/15/2018 05:39 PM    Globulin 4.6 (H) 10/15/2018 05:39 PM    A-G Ratio 0.8 (L) 10/15/2018 05:39 PM    ALT (SGPT) 14 10/15/2018 05:39 PM           Assessment:     Assessment:     Diagnoses and all orders for this visit:    1. Paroxysmal atrial fibrillation (HCC)    2. Venous reflux        ICD-10-CM ICD-9-CM    1. Paroxysmal atrial fibrillation (HCC)  I48.0 427.31    2. Venous reflux  I87.2 459.81      No orders of the defined types were placed in this encounter. Plan:     1. Paroxysmal atrial fibrillation: Isolated event seen on holter monitor, rare palpitation, continue betablocker CV score is 4, continue Xarelto/beta-blocker.   2.  Venous reflux: L SGV insufficieny, recommend follow-up with lymphedema clinic and compression stockings. 3.  Hypertension: Continue current medications, reviewed low sodium intake. Repeat blood pressure at end of visit 138/88  4. Hx URVASHI: Followed by [facility]  Stable from cardiac perspective, due to increasing lower extremity edema advised patient to resume furosemide daily until edema improves then transition back to as needed. Lab work followed by primary care, patient reports last drawn approximately 3 months ago. Stable from cardiac perspective follow-up in 6 months        Arabella Hernandez NP      Please note that this dictation was completed with [place], the [employer] voice recognition software. Quite often unanticipated grammatical, syntax, homophones, and other interpretive errors are inadvertently transcribed by the computer software. Please disregard these errors. Please excuse any errors that have escaped final proofreading. Thank you.

## 2020-11-06 NOTE — PROGRESS NOTES
Identified pt with two pt identifiers(name and ). Reviewed record in preparation for visit and have obtained necessary documentation. Chief Complaint   Patient presents with    Follow-up    Irregular Heart Beat           Visit Vitals  BP (!) 164/102 (BP 1 Location: Right arm, BP Patient Position: Sitting)   Pulse 86   Temp 98.4 °F (36.9 °C) (Temporal)   Resp 16   Ht 5' (1.524 m)   Wt 249 lb (112.9 kg)   SpO2 99%   BMI 48.63 kg/m²     Pain Scale: 0 - No pain/10    Coordination of Care Questionnaire:  :   1. Have you been to the ER, urgent care clinic since your last visit? Hospitalized since your last visit? No    2. Have you seen or consulted any other health care providers outside of the [address] [place] [address] since your last visit? Include any pap smears or colon screening.  No

## 2020-12-21 ENCOUNTER — TELEPHONE (OUTPATIENT)
Dept: CARDIOLOGY CLINIC | Age: 55
End: 2020-12-21

## 2020-12-21 NOTE — TELEPHONE ENCOUNTER
Spoke with pharmacy staff who states when pt initially tried to  medication it was too soon for a refill. But pt should be able to  medication today.

## 2020-12-21 NOTE — TELEPHONE ENCOUNTER
Placed call to pt. Two pt identifiers confirmed. Informed pt that I spoke with pharmacy and medication will ready for pick-up.

## 2020-12-23 NOTE — PROGRESS NOTES
Lymphedema Services: Patient was seen in the [address] [place] [address] for 5 visits from 3/10/20-7/15/20 but never returned for follow up. Since her plan of care  on 20, she will be discharged from Lymphedema Services at this time.    Neida Abad, PT, CLT

## 2021-03-02 NOTE — ED NOTES
Physical Therapy     Referred by: Delaney Hiu NP; Medical Diagnosis (from order):      Physical Therapy -  Daily Treatment Note    Visit:  4     SUBJECTIVE                                                                                                             Patient reports that her physical therapy session went well last week. States that she definitely feels more mobility with cervical rotation. Notes that she really has to extend past shoulder into right cervical rotation to feel pain.    Pain:  Location: cervical spine  Currently: 0/10   Worst: occasionally awakes with pain at 5/10   Functional Change: Per above.    Pain / Symptoms:  Pain rating (out of 10): Current: 0     OBJECTIVE                                                                                                                     Range of Motion (ROM)   (degrees unless noted; active unless noted; norms in ( ); negative=lacking to 0, positive=beyond 0)   Cervical:    - Rotation (60-80):        • Left: 80°        • Right: 78%      TREATMENT                                                                                                                  Therapeutic Exercise:  Supine:  Thoracic extension over foam roller 2 x 10 at varying levels   foam roll along spine for alternating shoulder flexion, horizontal abduction x 10 each   Open book stretch x 5 left/right    Prone:   Scapular retraction with shoulder extension x 10   Scapular retraction \"T\" 2 x 10     Seated:  Thoracic extension over chair x 10 - hands behind head   SNAG cervical rotation x 5 right/left  SNAG cervical extension x 10, x 5     Standing:  Cervical retraction against wall, UE abduction x 10  Bilateral shoulder external rotation with red TB, cervical retraction against wall 2 x 10     Quadruped:  Thoracic rotation with arm extended x 10 right/left     Manual Therapy:  Prone PA cervical and thoracic grade III mobilizations     Patient prone, soft tissue mobilization  Portable chest xray complete. to bilateral cervical paraspinals and upper trapezius to reduce tissue restrictions. Skin inspection okay before and after - no abnormal redness or skin issues.        Skilled input: verbal instruction/cues and as detailed above    Writer verbally educated and received verbal consent for hand placement, positioning of patient, and techniques to be performed today from patient for therapist position for techniques, clothing adjustments for techniques and hand placement and palpation for techniques as described above and how they are pertinent to the patient's plan of care.    Home Exercise Program: Previous session plus below:    Access Code: [email or web address]   URL: [email or web address]   Date: 03/02/2021   Prepared by: Priscilla Medina AAH: Zee Assisted Cervical Extension (Backward Bend) SNAG with Towel _Mobilization- 10 reps- 2 sets- 1x daily- 7x weekly   Hooklying Shoulder Flexion Extension AROM on Foam Roll- 10 reps - 2 sets- 1x daily- 7x weekly   Quadruped Full Range Thoracic Rotation with Reach- 10 reps- 1 sets- 1x daily- 7x weekly   open book stretch- 10 reps- 1 sets- 1x daily- 7x weekly        ASSESSMENT                                                                                                             No considerable increase in symptoms with any of today's treatment. Demonstrates good ability to maintain cervical retraction during activities. Pain only at very end range right cervical rotation. Patient to be scheduled out 2 weeks from today and instructed to cancel if symptoms continue to improve otherwise we will hold this appointment for follow up. Patient is progressing well and will continue to benefit from physical therapy to achieve all established long term goals.          PLAN                                                                                                                           Suggestions for next session as indicated: Progress per plan of  care         Therapy procedure time and total treatment time can be found documented on the Time Entry flowsheet

## 2021-04-12 ENCOUNTER — TELEPHONE (OUTPATIENT)
Dept: CARDIOLOGY CLINIC | Age: 56
End: 2021-04-12

## 2021-04-12 RX ORDER — RIVAROXABAN 20 MG/1
TABLET, FILM COATED ORAL
Qty: 90 TAB | Refills: 1 | Status: SHIPPED | OUTPATIENT
Start: 2021-04-12 | End: 2022-01-17 | Stop reason: SDUPTHER

## 2021-04-12 RX ORDER — METOPROLOL SUCCINATE 50 MG/1
50 TABLET, EXTENDED RELEASE ORAL
Qty: 90 TAB | Refills: 1 | Status: SHIPPED | OUTPATIENT
Start: 2021-04-12 | End: 2021-12-02

## 2021-04-12 RX ORDER — SPIRONOLACTONE 25 MG/1
25 TABLET ORAL DAILY
Qty: 90 TAB | Refills: 1 | Status: SHIPPED | OUTPATIENT
Start: 2021-04-12 | End: 2021-12-02

## 2021-04-12 NOTE — TELEPHONE ENCOUNTER
PCP: Devin Grijalva MD    Last appt: 11/6/2020  Future Appointments   Date Time Provider Martha Ram   5/10/2021  9:20 AM Mary Handy NP [facility] [address] [facility] [address] [facility] AMB       Requested Prescriptions     Pending Prescriptions Disp Refills    spironolactone (ALDACTONE) 25 mg tablet 90 Tab 1     Sig: Take 1 Tab by mouth daily.  Xarelto 20 mg tab tablet 90 Tab 1     Sig: TAKE 1 TABLE TBY MOUTH DAILY    metoprolol succinate (TOPROL-XL) 50 mg XL tablet 90 Tab 1     Sig: Take 1 Tab by mouth nightly.        Prior labs and Blood pressures:  BP Readings from Last 3 Encounters:   11/06/20 138/88   07/30/20 135/87   05/18/20 132/82     Lab Results   Component Value Date/Time    Sodium 138 10/15/2018 05:39 PM    Potassium 3.8 10/15/2018 05:39 PM    Chloride 105 10/15/2018 05:39 PM    CO2 28 10/15/2018 05:39 PM    Anion gap 5 10/15/2018 05:39 PM    Glucose 94 10/15/2018 05:39 PM    BUN 16 10/15/2018 05:39 PM    Creatinine 1.03 (H) 10/15/2018 05:39 PM    BUN/Creatinine ratio 16 10/15/2018 05:39 PM    GFR est AA >60 10/15/2018 05:39 PM    GFR est non-AA 56 (L) 10/15/2018 05:39 PM    Calcium 8.6 10/15/2018 05:39 PM     No results found for: HBA1C, HGBE8, EBT7CVHN, KNU4RTUF  No results found for: CHOL, CHOLPOCT, CHOLX, CHLST, CHOLV, HDL, HDLPOC, HDLP, LDL, LDLCPOC, LDLC, DLDLP, VLDLC, VLDL, TGLX, TRIGL, TRIGP, TGLPOCT, CHHD, CHHDX  No results found for: VITD3, XQVID2, XQVID3, XQVID, VD3RIA    No results found for: TSH, TSH2, TSH3, TSHP, TSHEXT

## 2021-04-12 NOTE — TELEPHONE ENCOUNTER
Refills:    furosemide (LASIX) 20 mg tablet    metoprolol succinate (TOPROL-XL) 50 mg XL tablet    spironolactone (ALDACTONE) 25 mg tablet      Xarelto 20 mg tab tablet    Thanks

## 2021-05-09 NOTE — PROGRESS NOTES
[facility] @ [address], [place]  Subjective/HPI:     Vinny Trujillo is a 63 y.o. female is here for routine f/u. The patient denies chest pain/ shortness of breath, orthopnea, PND, syncope, presyncope. Patient reporting an occasional palpitation. Remains on Xarelto denies any bruising or bleeding. In regards to her venous reflux she has not been seen by vascular clinic at [facility] in well over a year. She has a history of obstructive sleep apnea she does not like wearing the CPAP machine as she reports it makes her more tired has not worn in 3 years. 11/2020 Visit  1.  Paroxysmal atrial fibrillation: Isolated event seen on holter monitor, rare palpitation, continue betablocker CV score is 4, continue Xarelto/beta-blocker. 2.  Venous reflux: L SGV insufficieny, recommend follow-up with lymphedema clinic and compression stockings. 3.  Hypertension: Continue current medications, reviewed low sodium intake. Repeat blood pressure at end of visit 138/88  4.  Hx URVASHI: Followed by [facility]  Stable from cardiac perspective, due to increasing lower extremity edema advised patient to resume furosemide daily until edema improves then transition back to as needed. Lab work followed by primary care, patient reports last drawn approximately 3 months ago. Stable from cardiac perspective follow-up in 6 months    ECHO 3/2020  Echo Findings      Left Ventricle  Normal cavity size and systolic function (ejection fraction normal). Mild concentric hypertrophy .  The estimated ejection fraction is 55 - 60%. LV wall motion is noted to be no regional wall motion abnormality. There is mild (grade 1) left ventricular diastolic dysfunction. Left Atrium  Normal cavity size. Right Ventricle  Normal cavity size and global systolic function. Right Atrium  Normal cavity size. Aortic Valve  Trileaflet valve structure, no stenosis and no regurgitation.  Normal aortic leaflet mobility. Mitral Valve  No stenosis. Leaflet calcification. There is mild anterior leaflet calcification diffusely. There is mild posterior leaflet calcification diffusely. Mild mitral annular calcification. Trace regurgitation. Tricuspid Valve  Normal valve structure, no stenosis and no regurgitation. There is no evidence of pulmonary hypertension. Pulmonic Valve  Normal valve structure and no stenosis. Trace regurgitation. Aorta  Normal aortic root and ascending aorta. Pericardium  No evidence of pericardial effusion. PCP Provider  Ananth Gonsalves MD  Past Medical History:   Diagnosis Date    Asthma     Hypertension     Lymphedema of both lower extremities 12/31/2019    History of secondary cellulitis as well as DVT with PTE    Thromboembolus ([address]     \"and PEs\"      Past Surgical History:   Procedure Laterality Date    COLONOSCOPY N/A 7/30/2020    COL[facility]COPY    HX BREAST BIOPSY Bilateral     2004 stereo bx?     HX ORTHOPAEDIC      right hand tendon repair and screw in wrist and took piece of bone from hip f     Allergies   Allergen Reactions    Lisinopril Swelling      Family History   Problem Relation Age of Onset    Cancer Mother     Stroke Father       Current Outpatient Medications   Medication Sig    spironolactone (ALDACTONE) 25 mg tablet Take 1 Tab by mouth daily.  Xarelto 20 mg tab tablet TAKE 1 TABLE TBY MOUTH DAILY    metoprolol succinate (TOPROL-XL) 50 mg XL tablet Take 1 Tab by mouth nightly.  triamcinolone acetonide (KENALOG) 0.1 % topical cream     furosemide (LASIX) 20 mg tablet Take 1 Tab by mouth as needed (edema).  cetirizine (ZyrTEC) 10 mg tablet Take 10 mg by mouth daily as needed for Allergies.  acetaminophen (Tylenol Arthritis Pain) 650 mg TbER Take 650 mg by mouth as needed.  traMADoL (ULTRAM) 50 mg tablet Take 50 mg by mouth every six (6) hours as needed for Pain.     albuterol (ProAir HFA) 90 mcg/actuation inhaler Take 1 Puff by inhalation every six (6) hours as needed for Wheezing.  Symbicort 80-4.5 mcg/actuation HFAA Take 2 Puffs by inhalation two (2) times daily as needed.  GaviLyte-N 420 gram solution Having done July 30.2020    clotrimazole (LOTRIMIN) 1 % topical cream Apply  to affected area two (2) times daily as needed. No current facility-administered medications for this visit. There were no vitals filed for this visit.   Social History     Socioeconomic History    Marital status: SINGLE     Spouse name: Not on file    Number of children: Not on file    Years of education: Not on file    Highest education level: Not on file   Occupational History    Not on file   Social Needs    Financial resource strain: Not on file    Food insecurity     Worry: Not on file     Inability: Not on file    Transportation needs     Medical: Not on file     Non-medical: Not on file   Tobacco Use    Smoking status: Never Smoker    Smokeless tobacco: Never Used   Substance and Sexual Activity    Alcohol use: Yes     Comment: occ    Drug use: No    Sexual activity: Not on file   Lifestyle    Physical activity     Days per week: Not on file     Minutes per session: Not on file    Stress: Not on file   Relationships    Social connections     Talks on phone: Not on file     Gets together: Not on file     Attends Moravian service: Not on file     Active member of club or organization: Not on file     Attends meetings of clubs or organizations: Not on file     Relationship status: Not on file    Intimate partner violence     Fear of current or ex partner: Not on file     Emotionally abused: Not on file     Physically abused: Not on file     Forced sexual activity: Not on file   Other Topics Concern    Not on file   Social History Narrative    ** Merged History Encounter **            I have reviewed the nurses notes, vitals, problem list, allergy list, medical history, family, social history and medications. Review of Symptoms  11 systems reviewed, negative other than as stated in the HPI      Physical Exam:      General: Well developed, in no acute distress, cooperative and alert  HEENT: No carotid bruits, no JVD, trach is midline. Neck Supple, PERRL, EOM intact. Heart:  Normal S1/S2 negative S3 or S4. Regular, no murmur, gallop or rub. Respiratory: Clear bilaterally x 4, no wheezing or rales  Abdomen:   Soft, non-tender, no masses, bowel sounds are active. Extremities: Nonpitting bilateral lower extremity edema, normal cap refill, no cyanosis, atraumatic. Neuro: A&Ox3, speech clear, gait stable. Skin: Skin color is normal. No rashes or lesions. Non diaphoretic  Vascular: 2+ pulses symmetric in all extremities    Cardiographics    ECG: Sinus rhythm        Cardiology Labs:  No results found for: CHOL, CHOLX, CHLST, CHOLV, [id number], HDL, HDLP, LDL, LDLC, DLDLP, TGLX, TRIGL, TRIGP, CHHD, CHHDX    Lab Results   Component Value Date/Time    Sodium 138 10/15/2018 05:39 PM    Potassium 3.8 10/15/2018 05:39 PM    Chloride 105 10/15/2018 05:39 PM    CO2 28 10/15/2018 05:39 PM    Anion gap 5 10/15/2018 05:39 PM    Glucose 94 10/15/2018 05:39 PM    BUN 16 10/15/2018 05:39 PM    Creatinine 1.03 (H) 10/15/2018 05:39 PM    BUN/Creatinine ratio 16 10/15/2018 05:39 PM    GFR est AA >60 10/15/2018 05:39 PM    GFR est non-AA 56 (L) 10/15/2018 05:39 PM    Calcium 8.6 10/15/2018 05:39 PM    Bilirubin, total 1.3 (H) 10/15/2018 05:39 PM    Alk. phosphatase 51 10/15/2018 05:39 PM    Protein, total 8.3 (H) 10/15/2018 05:39 PM    Albumin 3.7 10/15/2018 05:39 PM    Globulin 4.6 (H) 10/15/2018 05:39 PM    A-G Ratio 0.8 (L) 10/15/2018 05:39 PM    ALT (SGPT) 14 10/15/2018 05:39 PM           Assessment:     Assessment:     Diagnoses and all orders for this visit:    1. Paroxysmal atrial fibrillation (HCC)    2. Peripheral vascular disease ([facility] [address]        ICD-10-CM ICD-9-CM    1.  [facility]oxysmal atrial fibrillation ([facility]  I48.0 427.31    2. Peripheral vascular disease (HCC)  I73.9 443.9      No orders of the defined types were placed in this encounter. Plan:     1.  Paroxysmal atrial fibrillation: Presenting in sinus rhythm today, continue Xarelto and beta-blocker  2.  Venous reflux: L SGV insufficieny, recommend follow-up with lymphedema clinic and compression stockings. She will call [facility] to reestablish as she has new [employer] insurance  3.  Hypertension: Controlled 118/82 continue current therapy  4.  Hx URVASHI: Defers wearing CPAP machine. Stable from cardiac perspective follow-up in 6 months. She will see her primary care Dr. Cristal Brigth tomorrow for routine lab work. Counseled patient on obtaining Covid vaccine. Jyoti Willoughby NP      Please note that this dictation was completed with [employer], the computer voice recognition software. Quite often unanticipated grammatical, syntax, homophones, and other interpretive errors are inadvertently transcribed by the computer software. Please disregard these errors. Please excuse any errors that have escaped final proofreading. Thank you.

## 2021-05-10 ENCOUNTER — OFFICE VISIT (OUTPATIENT)
Dept: CARDIOLOGY CLINIC | Age: 56
End: 2021-05-10
Payer: MEDICARE

## 2021-05-10 VITALS
SYSTOLIC BLOOD PRESSURE: 118 MMHG | BODY MASS INDEX: 48.49 KG/M2 | WEIGHT: 247 LBS | DIASTOLIC BLOOD PRESSURE: 82 MMHG | RESPIRATION RATE: 18 BRPM | OXYGEN SATURATION: 99 % | HEIGHT: 60 IN | HEART RATE: 83 BPM

## 2021-05-10 DIAGNOSIS — I73.9 PERIPHERAL VASCULAR DISEASE (HCC): ICD-10-CM

## 2021-05-10 DIAGNOSIS — I48.0 PAROXYSMAL ATRIAL FIBRILLATION (HCC): Primary | ICD-10-CM

## 2021-05-10 PROCEDURE — 93000 ELECTROCARDIOGRAM COMPLETE: CPT | Performed by: NURSE PRACTITIONER

## 2021-05-10 PROCEDURE — G8427 DOCREV CUR MEDS BY ELIG CLIN: HCPCS | Performed by: NURSE PRACTITIONER

## 2021-05-10 PROCEDURE — G8432 DEP SCR NOT DOC, RNG: HCPCS | Performed by: NURSE PRACTITIONER

## 2021-05-10 PROCEDURE — G8417 CALC BMI ABV UP PARAM F/U: HCPCS | Performed by: NURSE PRACTITIONER

## 2021-05-10 PROCEDURE — G9899 SCRN MAM PERF RSLTS DOC: HCPCS | Performed by: NURSE PRACTITIONER

## 2021-05-10 PROCEDURE — 99214 OFFICE O/P EST MOD 30 MIN: CPT | Performed by: NURSE PRACTITIONER

## 2021-05-10 PROCEDURE — 3017F COLORECTAL CA SCREEN DOC REV: CPT | Performed by: NURSE PRACTITIONER

## 2021-05-10 NOTE — LETTER
5/10/2021 Patient: Tate Pittman YOB: 1965 Date of Visit: 5/10/2021 Lina Roberts MD 
[facility] [id number] Via Fax: [phone] Dear Lina Roberts MD, Thank you for referring Ms. Lisa Vargas to [address] [place] [address] for evaluation. My notes for this consultation are attached. If you have questions, please do not hesitate to call me. I look forward to following your patient along with you. Sincerely, Jael Tolbert NP

## 2021-05-10 NOTE — PROGRESS NOTES
Chief Complaint   Patient presents with    Irregular Heart Beat     6m f/u, Pt c/o palpitations. 1. Have you been to the ER, urgent care clinic since your last visit? Hospitalized since your last visit? No    2. Have you seen or consulted any other health care providers outside of the [address] [place] [address] since your last visit? Include any pap smears or colon screening. No      Pt has been forgetting to take her medications and will try and take it the next day. Pt had Covid 8/2020.

## 2021-09-16 DIAGNOSIS — M25.561 RIGHT KNEE PAIN, UNSPECIFIED CHRONICITY: ICD-10-CM

## 2021-09-16 DIAGNOSIS — M25.562 LEFT KNEE PAIN, UNSPECIFIED CHRONICITY: Primary | ICD-10-CM

## 2021-09-17 ENCOUNTER — OFFICE VISIT (OUTPATIENT)
Dept: ORTHOPEDIC SURGERY | Age: 56
End: 2021-09-17
Payer: MEDICARE

## 2021-09-17 ENCOUNTER — HOSPITAL ENCOUNTER (OUTPATIENT)
Dept: GENERAL RADIOLOGY | Age: 56
Discharge: HOME | End: 2021-09-17
Payer: MEDICARE

## 2021-09-17 VITALS
HEART RATE: 75 BPM | DIASTOLIC BLOOD PRESSURE: 87 MMHG | WEIGHT: 245 LBS | SYSTOLIC BLOOD PRESSURE: 129 MMHG | TEMPERATURE: 97.6 F | OXYGEN SATURATION: 99 % | BODY MASS INDEX: 48.1 KG/M2 | HEIGHT: 60 IN

## 2021-09-17 DIAGNOSIS — M17.0 BILATERAL PRIMARY OSTEOARTHRITIS OF KNEE: Primary | ICD-10-CM

## 2021-09-17 DIAGNOSIS — M25.562 LEFT KNEE PAIN, UNSPECIFIED CHRONICITY: ICD-10-CM

## 2021-09-17 DIAGNOSIS — M25.561 RIGHT KNEE PAIN, UNSPECIFIED CHRONICITY: ICD-10-CM

## 2021-09-17 PROCEDURE — G8510 SCR DEP NEG, NO PLAN REQD: HCPCS | Performed by: ORTHOPAEDIC SURGERY

## 2021-09-17 PROCEDURE — 73564 X-RAY EXAM KNEE 4 OR MORE: CPT

## 2021-09-17 PROCEDURE — G9899 SCRN MAM PERF RSLTS DOC: HCPCS | Performed by: ORTHOPAEDIC SURGERY

## 2021-09-17 PROCEDURE — 99203 OFFICE O/P NEW LOW 30 MIN: CPT | Performed by: ORTHOPAEDIC SURGERY

## 2021-09-17 PROCEDURE — G8417 CALC BMI ABV UP PARAM F/U: HCPCS | Performed by: ORTHOPAEDIC SURGERY

## 2021-09-17 PROCEDURE — G8427 DOCREV CUR MEDS BY ELIG CLIN: HCPCS | Performed by: ORTHOPAEDIC SURGERY

## 2021-09-17 PROCEDURE — 3017F COLORECTAL CA SCREEN DOC REV: CPT | Performed by: ORTHOPAEDIC SURGERY

## 2021-09-17 RX ORDER — MEDROXYPROGESTERONE ACETATE 10 MG/1
10 TABLET ORAL DAILY
COMMUNITY
Start: 2021-09-15 | End: 2022-06-29 | Stop reason: ALTCHOICE

## 2021-09-17 NOTE — PROGRESS NOTES
Identified pt with two pt identifiers (name and ). Reviewed chart in preparation for visit and have obtained necessary documentation. Evelyn Painter is a 63 y.o. female  Chief Complaint   Patient presents with    Knee Pain     Bilateral Knee     Visit Vitals  /87 (BP 1 Location: Right arm, BP Patient Position: Sitting, BP Cuff Size: Large adult)   Pulse 75   Temp 97.6 °F (36.4 °C) (Tympanic)   Ht 5' (1.524 m)   Wt 245 lb (111.1 kg)   SpO2 99%   BMI 47.85 kg/m²     1. Have you been to the ER, urgent care clinic since your last visit? Hospitalized since your last visit? No    2. Have you seen or consulted any other health care providers outside of the [address] [place] [address] since your last visit? Include any pap smears or colon screening.  No

## 2021-09-17 NOTE — LETTER
9/20/2021    Patient: Sushila Carlos   YOB: 1965   Date of Visit: 9/17/2021     Felix Hendricks MD  [address] [address]  Via Fax: [phone]    Dear Felix Hendricks MD,      Thank you for referring Ms. Danial Chaney to [facility] for evaluation. My notes for this consultation are attached. If you have questions, please do not hesitate to call me. I look forward to following your patient along with you.       Sincerely,    Rickie Smith, DO

## 2021-09-18 NOTE — PROGRESS NOTES
9/18/2021    Chief Complaint: Bilateral knee pain    HPI: This is a 63 y.o. female who complains of bilateral knee pain. Neither2 side is worse than the other side. Onset was gradual.  The patient has had activity dependent pain for years. The patient has tried activity modification, physical therapy exercises, injections have worked in the past.  The pain is in the medial knees, it is severe in intensity. The patient feels unstable with the knee, fears falling, and has significant limitation with activities of daily living, recreation, and walks with a limp. Past Medical History:   Diagnosis Date    Asthma     COVID-19 08/2020    Environmental allergies     Hypertension     Lymphedema of both lower extremities 12/31/2019    History of secondary cellulitis as well as DVT with PTE    Thromboembolus ([address]     \"and PEs\"       Past Surgical History:   Procedure Laterality Date    COLONOSCOPY N/A 7/30/2020    COLONOSCOPY performed by Sung Peck MD at [facility] ENDOSCOPY    HX BREAST BIOPSY Bilateral     2004 stereo bx?     HX ORTHOPAEDIC      right hand tendon repair and screw in wrist and took piece of bone from hip f       Current Outpatient Medications on File Prior to Visit   Medication Sig Dispense Refill    medroxyPROGESTERone (PROVERA) 10 mg tablet       spironolactone (ALDACTONE) 25 mg tablet Take 1 Tab by mouth daily. 90 Tab 1    Xarelto 20 mg tab tablet TAKE 1 TABLE TBY MOUTH DAILY 90 Tab 1    metoprolol succinate (TOPROL-XL) 50 mg XL tablet Take 1 Tab by mouth nightly. 90 Tab 1    triamcinolone acetonide (KENALOG) 0.1 % topical cream       cetirizine (ZyrTEC) 10 mg tablet Take 10 mg by mouth daily as needed for Allergies.  acetaminophen (Tylenol Arthritis Pain) 650 mg TbER Take 650 mg by mouth as needed.  traMADoL (ULTRAM) 50 mg tablet Take 50 mg by mouth every six (6) hours as needed for Pain.       albuterol (ProAir HFA) 90 mcg/actuation inhaler Take 1 Puff by inhalation every six (6) hours as needed for Wheezing.  Symbicort 80-4.5 mcg/actuation HFAA Take 2 Puffs by inhalation two (2) times daily as needed.  clotrimazole (LOTRIMIN) 1 % topical cream Apply  to affected area two (2) times daily as needed.  furosemide (LASIX) 20 mg tablet Take 1 Tab by mouth as needed (edema). (Patient not taking: Reported on 9/17/2021) 90 Tab 1     No current facility-administered medications on file prior to visit. Allergies   Allergen Reactions    Lisinopril Swelling       Family History   Problem Relation Age of Onset    Cancer Mother     Stroke Father        Social History     Socioeconomic History    Marital status: SINGLE     Spouse name: Not on file    Number of children: Not on file    Years of education: Not on file    Highest education level: Not on file   Tobacco Use    Smoking status: Never Smoker    Smokeless tobacco: Never Used   Vaping Use    Vaping Use: Never used   Substance and Sexual Activity    Alcohol use: Yes     Comment: occ    Drug use: No   Social History Narrative    ** Merged History Encounter **          Social Determinants of Health     Financial Resource Strain:     Difficulty of Paying Living Expenses:    Food Insecurity:     Worried About Running Out of Food in the Last Year:     Ran Out of Food in the Last Year:    Transportation Needs:     Lack of Transportation (Medical):      Lack of Transportation (Non-Medical):    Physical Activity:     Days of Exercise per Week:     Minutes of Exercise per Session:    Stress:     Feeling of Stress :    Social Connections:     Frequency of Communication with Friends and Family:     Frequency of Social Gatherings with Friends and Family:     Attends Rastafarian Services:     Active Member of Clubs or Organizations:     Attends Club or Organization Meetings:     Marital Status:          Review of Systems:       General: Denies headache, lethargy, fever, weight loss  Ears/Nose/Throat: Denies ear discharge, drainage, nosebleeds, hoarse voice, dental problems  Cardiovascular: Denies chest pain, shortness of breath  Lungs: Denies chest pain, breathing problems, wheezing, pneumonia  Stomach: Denies stomach pain, heartburn, constipation, irritable bowel  Skin: Denies rash, sores, open wounds  Musculoskeletal: Admits to bilateral knee pain, no deformity. Genitourinary: Denies dysuria, hematuria, polyuria  Gastrointestinal: Denies constipation, obstipation, diarrhea  Neurological: Denies changes in sight, smell, hearing, taste, seizures. Denies loss of consciousness. Psychiatric: Denies depression, sleep pattern changes, anxiety, change in personality  Endocrine: Denies mood swings, heat or cold intolerance  Hematologic/Lymphatic: Denies anemia, purpura, petechia  Allergic/Immunologic: Denies swelling of throat, pain or swelling at lymph nodes      Physical Examination:    Visit Vitals  /87 (BP 1 Location: Right arm, BP Patient Position: Sitting, BP Cuff Size: Large adult)   Pulse 75   Temp 97.6 °F (36.4 °C) (Tympanic)   Ht 5' (1.524 m)   Wt 245 lb (111.1 kg)   SpO2 99%   BMI 47.85 kg/m²        General: AOX3, no apparent distress  Psychiatric: mood and affect appropriate  Lungs: breathing is symmetric and unlabored bilaterally  Heart: regular rate and rhythm  Abdomen: no guarding  Head: normocephalic, atraumatic  Skin: No significant abnormalities, good turgor  Sensation intact to light touch: L1-S1 dermatomes  Muscular exam: 5/5 strength in all major muscle groups unless noted in specialty exam.    Extremities:      Left upper extremity: Full active and passive range of motion without pain, deformity, no open wound, strength 5/5 in all major muscle groups. Right upper extremity: Full active and passive range of motion without pain, deformity, no open wound, strength 5/5 in all major muscle groups. Right lower extremity: No deformity is noted. Range of motion of the knee is 0-90. Ligamentous testing of the knee indicates stability of the the MCL, LCL, PCL, and ACL. Lachman's, anterior and posterior drawer tests are specifically negative. Medial joint line tenderness to palpation is noted. Popliteal area is unremarkable. 1+  for effusion. + patellar crepitus. Patella tracks centrally. Pivot shift is negative. Strength testing is indicative of 5/5 strength at hip flexion, extension, knee flexion and extension, tibialis anterior, EHL, and FHL. Sensation is intact to light touch in the L1-S1 dermatomes. Capillary refill is less than 2 seconds in the toes. Left lower extremity:  No deformity is noted. Range of motion of the knee is 0-90. Ligamentous testing of the knee indicates stability of the the MCL, LCL, PCL, and ACL. Lachman's, anterior and posterior drawer tests are specifically negative. Medial joint line tenderness to palpation is noted. Popliteal area is unremarkable. 1+  for effusion. + patellar crepitus. Patella tracks centrally. Pivot shift is negative. Strength testing is indicative of 5/5 strength at hip flexion, extension, knee flexion and extension, tibialis anterior, EHL, and FHL. Sensation is intact to light touch in the L1-S1 dermatomes. Capillary refill is less than 2 seconds in the toes. Diagnostics:    Pertinent Diagnostics:  Xrays are available of the bilateral knee, they indicate severe tricompartmental osteoarthritis of the knee joints, no significant other findings, no other osseus abnormalities, fractures, or dislocations. Assessment: Osteoarthritis bilateral knee    Plan: This patient and I did discuss the many options in treating knee osteoarthritis. We did discuss that we could continue to seek out nonoperative modalities, such as: NSAIDs, oral and topical analgesics, knee injections, knee braces, physical therapy, stretching, strengthening, and weight loss strategies, activity modification, ambulatory assistive devices.   The patient stated their understanding with this, but and would like to proceed with nonsurgical management in the form of weight loss, follow up in 3 weeks, possible injections then. Procedures: none today    Ms. Gi Baugh has a reminder for a \"due or due soon\" health maintenance. I have asked that she contact her primary care provider for follow-up on this health maintenance.

## 2021-10-08 ENCOUNTER — OFFICE VISIT (OUTPATIENT)
Dept: ORTHOPEDIC SURGERY | Age: 56
End: 2021-10-08
Payer: MEDICARE

## 2021-10-08 VITALS
BODY MASS INDEX: 47.71 KG/M2 | HEART RATE: 76 BPM | SYSTOLIC BLOOD PRESSURE: 142 MMHG | TEMPERATURE: 97.8 F | WEIGHT: 243 LBS | DIASTOLIC BLOOD PRESSURE: 89 MMHG | OXYGEN SATURATION: 99 % | HEIGHT: 60 IN

## 2021-10-08 DIAGNOSIS — M17.0 BILATERAL PRIMARY OSTEOARTHRITIS OF KNEE: Primary | ICD-10-CM

## 2021-10-08 PROCEDURE — G8510 SCR DEP NEG, NO PLAN REQD: HCPCS | Performed by: ORTHOPAEDIC SURGERY

## 2021-10-08 PROCEDURE — G9899 SCRN MAM PERF RSLTS DOC: HCPCS | Performed by: ORTHOPAEDIC SURGERY

## 2021-10-08 PROCEDURE — 99213 OFFICE O/P EST LOW 20 MIN: CPT | Performed by: ORTHOPAEDIC SURGERY

## 2021-10-08 PROCEDURE — G8427 DOCREV CUR MEDS BY ELIG CLIN: HCPCS | Performed by: ORTHOPAEDIC SURGERY

## 2021-10-08 PROCEDURE — G8417 CALC BMI ABV UP PARAM F/U: HCPCS | Performed by: ORTHOPAEDIC SURGERY

## 2021-10-08 PROCEDURE — 3017F COLORECTAL CA SCREEN DOC REV: CPT | Performed by: ORTHOPAEDIC SURGERY

## 2021-10-08 PROCEDURE — 20610 DRAIN/INJ JOINT/BURSA W/O US: CPT | Performed by: ORTHOPAEDIC SURGERY

## 2021-10-08 RX ORDER — BETAMETHASONE SODIUM PHOSPHATE AND BETAMETHASONE ACETATE 3; 3 MG/ML; MG/ML
6 INJECTION, SUSPENSION INTRA-ARTICULAR; INTRALESIONAL; INTRAMUSCULAR; SOFT TISSUE ONCE
Status: COMPLETED | OUTPATIENT
Start: 2021-10-08 | End: 2021-10-08

## 2021-10-08 RX ADMIN — BETAMETHASONE SODIUM PHOSPHATE AND BETAMETHASONE ACETATE 6 MG: 3; 3 INJECTION, SUSPENSION INTRA-ARTICULAR; INTRALESIONAL; INTRAMUSCULAR; SOFT TISSUE at 14:45

## 2021-10-08 NOTE — PROGRESS NOTES
Identified pt with two pt identifiers (name and ). Reviewed chart in preparation for visit and have obtained necessary documentation. Karli Fontanez is a 61 y.o. female  Chief Complaint   Patient presents with    Follow-up     Bilateral knee     Visit Vitals  BP (!) 142/89 (BP 1 Location: Left upper arm, BP Patient Position: Sitting, BP Cuff Size: Large adult)   Pulse 76   Temp 97.8 °F (36.6 °C) (Tympanic)   Ht 5' (1.524 m)   Wt 243 lb (110.2 kg)   SpO2 99%   BMI 47.46 kg/m²     1. Have you been to the ER, urgent care clinic since your last visit? Hospitalized since your last visit? No    2. Have you seen or consulted any other health care providers outside of the [address] [place] [address] since your last visit? Include any pap smears or colon screening.  No

## 2021-10-09 NOTE — PROGRESS NOTES
10/8/2021      CC: bilateral knee pain    HPI:      This is a 61y.o. year old female who presents for a follow up visit. The patient was last seen and diagnosed with bilateral knee OA. The patient's treatments since the most recent visit have comprised of home pain medications, weight loss attempted. The patient has had no relief of the chief complaint. PMH:  Past Medical History:   Diagnosis Date    Asthma     COVID-19 08/2020    Environmental allergies     Hypertension     Lymphedema of both lower extremities 12/31/2019    History of secondary cellulitis as well as DVT with PTE    Thromboembolus ([address]     \"and PEs\"       PSxHx:  Past Surgical History:   Procedure Laterality Date    COLONOSCOPY N/A 7/30/2020    COLONOSCOPY performed by Travis Dee MD at [facility] ENDOSCOPY    HX BREAST BIOPSY Bilateral     2004 stereo bx?     HX ORTHOPAEDIC      right hand tendon repair and screw in wrist and took piece of bone from hip f       Meds:    Current Outpatient Medications:     medroxyPROGESTERone (PROVERA) 10 mg tablet, , Disp: , Rfl:     spironolactone (ALDACTONE) 25 mg tablet, Take 1 Tab by mouth daily. , Disp: 90 Tab, Rfl: 1    Xarelto 20 mg tab tablet, TAKE 1 TABLE TBY MOUTH DAILY, Disp: 90 Tab, Rfl: 1    metoprolol succinate (TOPROL-XL) 50 mg XL tablet, Take 1 Tab by mouth nightly., Disp: 90 Tab, Rfl: 1    triamcinolone acetonide (KENALOG) 0.1 % topical cream, , Disp: , Rfl:     cetirizine (ZyrTEC) 10 mg tablet, Take 10 mg by mouth daily as needed for Allergies. , Disp: , Rfl:     acetaminophen (Tylenol Arthritis Pain) 650 mg TbER, Take 650 mg by mouth as needed. , Disp: , Rfl:     traMADoL (ULTRAM) 50 mg tablet, Take 50 mg by mouth every six (6) hours as needed for Pain., Disp: , Rfl:     albuterol (ProAir HFA) 90 mcg/actuation inhaler, Take 1 Puff by inhalation every six (6) hours as needed for Wheezing., Disp: , Rfl:     Symbicort 80-4.5 mcg/actuation HFAA, Take 2 Puffs by inhalation two (2) times daily as needed. , Disp: , Rfl:     clotrimazole (LOTRIMIN) 1 % topical cream, Apply  to affected area two (2) times daily as needed. , Disp: , Rfl:     furosemide (LASIX) 20 mg tablet, Take 1 Tab by mouth as needed (edema). (Patient not taking: Reported on 9/17/2021), Disp: 90 Tab, Rfl: 1  No current facility-administered medications for this visit. All:  Allergies   Allergen Reactions    Lisinopril Swelling       Social Hx:  Social History     Socioeconomic History    Marital status: SINGLE     Spouse name: Not on file    Number of children: Not on file    Years of education: Not on file    Highest education level: Not on file   Tobacco Use    Smoking status: Never Smoker    Smokeless tobacco: Never Used   Vaping Use    Vaping Use: Never used   Substance and Sexual Activity    Alcohol use: Yes     Comment: occ    Drug use: No   Social History Narrative    ** Merged History Encounter **          Social Determinants of Health     Financial Resource Strain:     Difficulty of Paying Living Expenses:    Food Insecurity:     Worried About Running Out of Food in the Last Year:     [address] in the Last Year:    Transportation Needs:     Lack of Transportation (Medical):      Lack of Transportation (Non-Medical):    Physical Activity:     Days of Exercise per Week:     Minutes of Exercise per Session:    Stress:     Feeling of Stress :    Social Connections:     Frequency of Communication with Friends and Family:     Frequency of Social Gatherings with Friends and Family:     Attends Taoism Services:     Active Member of Clubs or Organizations:     Attends Club or Organization Meetings:     Marital Status:        Family Hx:  Family History   Problem Relation Age of Onset    Cancer Mother     Stroke Father          Review of Systems:       General: Denies headache, lethargy, fever, weight loss  Ears/Nose/Throat: Denies ear discharge, drainage, nosebleeds, hoarse voice, dental problems  Cardiovascular: Denies chest pain, shortness of breath  Lungs: Denies chest pain, breathing problems, wheezing, pneumonia  Stomach: Denies stomach pain, heartburn, constipation, irritable bowel  Skin: Denies rash, sores, open wounds  Musculoskeletal: bilateral knee pain  Genitourinary: Denies dysuria, hematuria, polyuria  Gastrointestinal: Denies constipation, obstipation, diarrhea  Neurological: Denies changes in sight, smell, hearing, taste, seizures. Denies loss of consciousness. Psychiatric: Denies depression, sleep pattern changes, anxiety, change in personality  Endocrine: Denies mood swings, heat or cold intolerance  Hematologic/Lymphatic: Denies anemia, purpura, petechia  Allergic/Immunologic: Denies swelling of throat, pain or swelling at lymph nodes      Physical Examination:    Visit Vitals  BP (!) 142/89 (BP 1 Location: Left upper arm, BP Patient Position: Sitting, BP Cuff Size: Large adult)   Pulse 76   Temp 97.8 °F (36.6 °C) (Tympanic)   Ht 5' (1.524 m)   Wt 243 lb (110.2 kg)   SpO2 99%   BMI 47.46 kg/m²        General: AOX3, no apparent distress  Psychiatric: mood and affect appropriate  Lungs: breathing is symmetric and unlabored bilaterally  Heart: regular rate and rhythm  Abdomen: no guarding  Head: normocephalic, atraumatic  Skin: No significant abnormalities, good turgor  Sensation intact to light touch: C5-T1 dermatomes  Muscular exam: 5/5 strength in all major muscle groups unless noted in specialty exam.    Extremities        Right lower extremity: Knee is noted to have a mild effusion. Medial joint line tenderness to palpation with a positive varus deformity. Patellar crepitus with range of motion is noted. Range of motion is 0 to 90. Sensation is intact to light touch L1-S1 dermatomes. Knee flexion and extension strength is 5/5 with Tibialis anterior, EHL, FHL being 5/5 as well. No other gross deformity or deficit is noted.     Left lower extremity:  Knee is noted to have a mild effusion. Medial joint line tenderness to palpation with a varus deformity. Patellar crepitus with range of motion is noted. Range of motion is 0 to 90. Sensation is intact to light touch L1-S1 dermatomes. Knee flexion and extension strength is 5/5 with Tibialis anterior, EHL, FHL being 5/5 as well. No other gross deformity or deficit is noted. Diagnostics:    Pertinent Diagnostics: none today    Assessment: bilateral primary osteoarthritis of the knees  Plan: This patient and I had a long discussion regarding her treatment options, she should continue her weight loss efforts, she will continue to find a medication that will help her with her pain, she has now accepted to an injection could be helpful for her. We have done this in the office today. I will call her in a week for repeat clinical check. She stated her understanding and satisfaction. Date of Procedure: 10/8/2021  PROCEDURE NOTE - Bilateral knee injection of Celestone    Consent was obtained from the patient. The correct site was identified after confirmation with the patient. Following identification and confirmation of the correct site with the patient, the superolateral right knee was prepped in the normal sterile fashion. A local anesthetic of 1% lidocaine without epinephrine was then administered to the local tissues. Following, an injection of a mixture of  6 mg Celestone and 1% lidocaine without epinephrine was administered to the right knee. The needle was then withdrawn and the injection site dressed with a sterile bandage at the conclusion. The procedure was well tolerated by the patient. No immediate adverse reactions were noted. Attention was then turned to the left knee. Following identification and confirmation of the correct site with the patient, the superolateral left knee was prepped in the normal sterile fashion.   A local anesthetic of 1% lidocaine without epinephrine was then administered to the local tissues. Following, an injection of a mixture of  6 mg Celestone and 1% lidocaine without epinephrine was administered to the left knee. The needle was then withdrawn and the injection site dressed with a sterile bandage at the conclusion. The procedure was well tolerated by the patient. No immediate adverse reactions were noted. Post injection instructions were given        Ms. Tayo Carrillo has a reminder for a \"due or due soon\" health maintenance. I have asked that she contact her primary care provider for follow-up on this health maintenance.

## 2021-10-18 ENCOUNTER — VIRTUAL VISIT (OUTPATIENT)
Dept: ORTHOPEDIC SURGERY | Age: 56
End: 2021-10-18
Payer: MEDICARE

## 2021-10-18 DIAGNOSIS — M17.0 BILATERAL PRIMARY OSTEOARTHRITIS OF KNEE: Primary | ICD-10-CM

## 2021-10-18 PROCEDURE — 99441 PR PHYS/QHP TELEPHONE EVALUATION 5-10 MIN: CPT | Performed by: ORTHOPAEDIC SURGERY

## 2021-10-18 RX ORDER — DICLOFENAC SODIUM 10 MG/G
4 GEL TOPICAL 4 TIMES DAILY
Qty: 1 EACH | Refills: 1 | Status: SHIPPED | OUTPATIENT
Start: 2021-10-18

## 2021-10-19 NOTE — PROGRESS NOTES
10/18/2021      CC: bilateral knee pain    HPI:      This is a 61y.o. year old female who presents for follow up of their bilateral knee injection. The patient states that they have had no relief of their pain. The time since injection has been one week. PMH:  Past Medical History:   Diagnosis Date    Asthma     COVID-19 08/2020    Environmental allergies     Hypertension     Lymphedema of both lower extremities 12/31/2019    History of secondary cellulitis as well as DVT with PTE    Thromboembolus ([address]     \"and PEs\"       PSxHx:  Past Surgical History:   Procedure Laterality Date    COLONOSCOPY N/A 7/30/2020    COL[facility]OSCOPY performed by Michael [facility] MD at [facility] ENDOSCOPY    HX BREAST BIOPSY Bilateral     2004 stereo bx?     HX ORTHOPAEDIC      right hand tendon repair and screw in wrist and took piece of bone from hip f       Meds:    Current Outpatient Medications:     diclofenac (VOLTAREN) 1 % gel, Apply 4 g to affected area four (4) times daily. , Disp: 1 Each, Rfl: 1    medroxyPROGESTERone (PROVERA) 10 mg tablet, , Disp: , Rfl:     spironolactone (ALDACTONE) 25 mg tablet, Take 1 Tab by mouth daily. , Disp: 90 Tab, Rfl: 1    Xarelto 20 mg tab tablet, TAKE 1 TABLE TBY MOUTH DAILY, Disp: 90 Tab, Rfl: 1    metoprolol succinate (TOPROL-XL) 50 mg XL tablet, Take 1 Tab by mouth nightly., Disp: 90 Tab, Rfl: 1    triamcinolone acetonide (KENALOG) 0.1 % topical cream, , Disp: , Rfl:     furosemide (LASIX) 20 mg tablet, Take 1 Tab by mouth as needed (edema). (Patient not taking: Reported on 9/17/2021), Disp: 90 Tab, Rfl: 1    cetirizine (ZyrTEC) 10 mg tablet, Take 10 mg by mouth daily as needed for Allergies. , Disp: , Rfl:     acetaminophen (Tylenol Arthritis Pain) 650 mg TbER, Take 650 mg by mouth as needed. , Disp: , Rfl:     traMADoL (ULTRAM) 50 mg tablet, Take 50 mg by mouth every six (6) hours as needed for Pain., Disp: , Rfl:     albuterol (ProAir HFA) 90 mcg/actuation inhaler, Take 1 Puff by inhalation every six (6) hours as needed for Wheezing., Disp: , Rfl:     Symbicort 80-4.5 mcg/actuation HFAA, Take 2 Puffs by inhalation two (2) times daily as needed. , Disp: , Rfl:     clotrimazole (LOTRIMIN) 1 % topical cream, Apply  to affected area two (2) times daily as needed. , Disp: , Rfl:     All:  Allergies   Allergen Reactions    Lisinopril Swelling       Social Hx:  Social History     Socioeconomic History    Marital status: SINGLE     Spouse name: Not on file    Number of children: Not on file    Years of education: Not on file    Highest education level: Not on file   Tobacco Use    Smoking status: Never Smoker    Smokeless tobacco: Never Used   Vaping Use    Vaping Use: Never used   Substance and Sexual Activity    Alcohol use: Yes     Comment: occ    Drug use: No   Social History Narrative    ** Merged History Encounter **          Social Determinants of Health     Financial Resource Strain:     Difficulty of Paying Living Expenses:    Food Insecurity:     Worried About Running Out of Food in the Last Year:     [address] in the Last Year:    Transportation Needs:     Lack of Transportation (Medical):      Lack of Transportation (Non-Medical):    Physical Activity:     Days of Exercise per Week:     Minutes of Exercise per Session:    Stress:     Feeling of Stress :    Social Connections:     Frequency of Communication with Friends and Family:     Frequency of Social Gatherings with Friends and Family:     Attends Scientologist Services:     Active Member of Clubs or Organizations:     Attends Club or Organization Meetings:     Marital Status:        Family Hx:  Family History   Problem Relation Age of Onset    Cancer Mother     Stroke Father          Review of Systems:       General: Denies headache, lethargy, fever, weight loss  Ears/Nose/Throat: Denies ear discharge, drainage, nosebleeds, hoarse voice, dental problems  Cardiovascular: Denies chest pain, shortness of breath  Lungs: Denies chest pain, breathing problems, wheezing, pneumonia  Stomach: Denies stomach pain, heartburn, constipation, irritable bowel  Skin: Denies rash, sores, open wounds  Musculoskeletal: bilateral knee pain  Genitourinary: Denies dysuria, hematuria, polyuria  Gastrointestinal: Denies constipation, obstipation, diarrhea  Neurological: Denies changes in sight, smell, hearing, taste, seizures. Denies loss of consciousness. Psychiatric: Denies depression, sleep pattern changes, anxiety, change in personality  Endocrine: Denies mood swings, heat or cold intolerance  Hematologic/Lymphatic: Denies anemia, purpura, petechia  Allergic/Immunologic: Denies swelling of throat, pain or swelling at lymph nodes      Physical Examination:    There were no vitals taken for this visit. General: AOX3, no apparent distress  Psychiatric: mood and affect appropriate        Diagnostics:    Pertinent Diagnostics: none    Assessment: Status post bilateral knee injection  Plan: This patient and I discussed the normal course for injections, we discussed that pain relief will likely be temporary to some degree, but I cannot predict the longevity of relief. We also discussed the limitations of injections, and that I cannot inject the same area any more often than every three months. We will proceed with continued weight loss efforts, otc medications for now. Follow up prn. Patient was in [place] at the time of encounter. I was in the office while conducting this encounter. Consent:  She and/or her healthcare decision maker is aware that this patient-initiated Telehealth encounter is a billable service, with coverage as determined by her insurance carrier. She is aware that she may receive a bill and has provided verbal consent to proceed: Yes    This virtual visit was conducted telephone encounter only.  -  I affirm this is a Patient Initiated Episode with an Established Patient who has not had a related appointment within my department in the past 7 days or scheduled within the next 24 hours. Note: this encounter is not billable if this call serves to triage the patient into an appointment for the relevant concern. Total Time: minutes: 5-10 minutes. Ms. Mitra Hardin has a reminder for a \"due or due soon\" health maintenance. I have asked that she contact her primary care provider for follow-up on this health maintenance.

## 2021-11-12 NOTE — PROGRESS NOTES
[facility] @ [address] [place], [place]  Subjective/HPI:     Sarah Swift is a 61 y.o. female hx  paroxysmal atrial fibrillation, hypertension, URVASHI deferring treatment venous reflux/lymphedema is here for routine f/u. The patient denies shortness of breath, orthopnea, PND,palpitations, syncope, presyncope or fatigue. Patient reports she has left arm numbness and tingling when she is sleeping left lateral recumbent will wake with numbness resolves with stretching the arm and range of motion. She is also reporting intermittent chest tightness pointing to the midsternal chest area and underneath the left breast.  The symptoms wax and wane and are not precipitated by physical activity. 5/10/21 Visit  1.  Paroxysmal atrial fibrillation: Presenting in sinus rhythm today, continue Xarelto and beta-blocker  2.  Venous reflux: L SGV insufficieny, recommend follow-up with lymphedema clinic and compression stockings. She will call [facility] to reestablish as she has new [employer] insurance  3.  Hypertension: Controlled 118/82 continue current therapy  4.  Hx URVASHI: Defers wearing CPAP machine. Stable from cardiac perspective follow-up in 6 months. She will see her primary care Dr. Khadijah Newell tomorrow for routine lab work. Counseled patient on obtaining Covid vaccine. ECHO 3/2020  Echo Findings      Left Ventricle  Normal cavity size and systolic function (ejection fraction normal). Mild concentric hypertrophy .  The estimated ejection fraction is 55 - 60%. LV wall motion is noted to be no regional wall motion abnormality. There is mild (grade 1) left ventricular diastolic dysfunction. Left Atrium  Normal cavity size. Right Ventricle  Normal cavity size and global systolic function. Right Atrium  Normal cavity size. Aortic Valve  Trileaflet valve structure, no stenosis and no regurgitation. Normal aortic leaflet mobility. Mitral Valve  No stenosis.  Leaflet calcification. There is mild anterior leaflet calcification diffusely. There is mild posterior leaflet calcification diffusely. Mild mitral annular calcification. Trace regurgitation. Tricuspid Valve  Normal valve structure, no stenosis and no regurgitation. There is no evidence of pulmonary hypertension. Pulmonic Valve  Normal valve structure and no stenosis. Trace regurgitation. Aorta  Normal aortic root and ascending aorta. Pericardium  No evidence of pericardial effusion.            PCP Provider  Malena Noriega MD  Past Medical History:   Diagnosis Date    Asthma     COVID-19 08/2020    Environmental allergies     Hypertension     Lymphedema of both lower extremities 12/31/2019    History of secondary cellulitis as well as DVT with PTE    Thromboembolus ([address]     \"and PEs\"      Past Surgical History:   Procedure Laterality Date    COLONOSCOPY N/A 7/30/2020    COLONOSCOPY performed by Daniela Garcia MD at [facility] ENDOSCOPY    HX BREAST BIOPSY Bilateral     2004 stereo bx?     HX ORTHOPAEDIC      right hand tendon repair and screw in wrist and took piece of bone from hip f     Allergies   Allergen Reactions    Lisinopril Swelling      Family History   Problem Relation Age of Onset    Cancer Mother     Stroke Father       Current Outpatient Medications   Medication Sig    diclofenac (VOLTAREN) 1 % gel Apply 4 g to affected area four (4) times daily.  medroxyPROGESTERone (PROVERA) 10 mg tablet Take 10 mg by mouth daily. Two pills daily    spironolactone (ALDACTONE) 25 mg tablet Take 1 Tab by mouth daily.  Xarelto 20 mg tab tablet TAKE 1 TABLE TBY MOUTH DAILY    metoprolol succinate (TOPROL-XL) 50 mg XL tablet Take 1 Tab by mouth nightly.  triamcinolone acetonide (KENALOG) 0.1 % topical cream     furosemide (LASIX) 20 mg tablet Take 1 Tab by mouth as needed (edema).  acetaminophen (Tylenol Arthritis Pain) 650 mg TbER Take 650 mg by mouth as needed.     traMADoL (ULTRAM) 50 mg tablet Take 50 mg by mouth every six (6) hours as needed for Pain.  albuterol (ProAir HFA) 90 mcg/actuation inhaler Take 1 Puff by inhalation every six (6) hours as needed for Wheezing.  Symbicort 80-4.5 mcg/actuation HFAA Take 2 Puffs by inhalation two (2) times daily as needed.  clotrimazole (LOTRIMIN) 1 % topical cream Apply  to affected area two (2) times daily as needed.  cetirizine (ZyrTEC) 10 mg tablet Take 10 mg by mouth daily as needed for Allergies. (Patient not taking: Reported on 11/15/2021)     No current facility-administered medications for this visit. Vitals:    11/15/21 0911   BP: 129/78   Pulse: 66   Resp: 19   SpO2: 98%   Weight: 243 lb 12.8 oz (110.6 kg)   Height: 5' (1.524 m)     Social History     Socioeconomic History    Marital status: SINGLE     Spouse name: Not on file    Number of children: Not on file    Years of education: Not on file    Highest education level: Not on file   Occupational History    Not on file   Tobacco Use    Smoking status: Never Smoker    Smokeless tobacco: Never Used   Vaping Use    Vaping Use: Never used   Substance and Sexual Activity    Alcohol use: Yes     Comment: occ    Drug use: No    Sexual activity: Not on file   Other Topics Concern    Not on file   Social History Narrative    ** Merged History Encounter **          Social Determinants of Health     Financial Resource Strain:     Difficulty of Paying Living Expenses: Not on file   Food Insecurity:     Worried About Running Out of Food in the Last Year: Not on file    Margarita of Food in the Last Year: Not on file   Transportation Needs:     Lack of Transportation (Medical): Not on file    Lack of Transportation (Non-Medical):  Not on file   Physical Activity:     Days of Exercise per Week: Not on file    Minutes of Exercise per Session: Not on file   Stress:     Feeling of Stress : Not on file   Social Connections:     Frequency of Communication with Friends and Family: Not on file    Frequency of Social Gatherings with Friends and Family: Not on file    Attends Pent[employer]ism Services: Not on file    Active Member of Clubs or Organizations: Not on file    Attends Club or Organization Meetings: Not on file    Marital Status: Not on file   Intimate Partner Violence:     Fear of Current or Ex-Partner: Not on file    Emotionally Abused: Not on file    Physically Abused: Not on file    Sexually Abused: Not on file   Housing Stability:     Unable to Pay for Housing in the Last Year: Not on file    Number of [place] in the Last Year: Not on file    Unstable Housing in the Last Year: Not on file       I have reviewed the nurses notes, vitals, problem list, allergy list, medical history, family, social history and medications. Review of Symptoms  11 systems reviewed, negative other than as stated in the HPI      Physical Exam:      General: Well developed, in no acute distress, cooperative and alert  HEENT: No carotid bruits, no JVD, trach is midline. Neck Supple, PERRL, EOM intact. Heart:  Normal S1/S2 negative S3 or S4. Regular, no murmur, gallop or rub. Respiratory: Clear bilaterally x 4, no wheezing or rales  Abdomen:   Soft, non-tender, no masses, bowel sounds are active. Extremities: Lateral lower extremity lymphedema left greater than right, normal cap refill, no cyanosis, atraumatic. Neuro: A&Ox3, speech clear, gait assisted with rolling walker  Skin: Skin color is normal. No rashes or lesions.  Non diaphoretic  Vascular: 2+ pulses symmetric in all extremities    Cardiographics    ECG: Sinus rhythm        Cardiology Labs:  No results found for: CHOL, CHOLX, CHLST, CHOLV, 404098, HDL, HDLP, LDL, LDLC, DLDLP, TGLX, TRIGL, TRIGP, CHHD, CHHDX    Lab Results   Component Value Date/Time    Sodium 138 10/15/2018 05:39 PM    Potassium 3.8 10/15/2018 05:39 PM    Chloride 105 10/15/2018 05:39 PM    CO2 28 10/15/2018 05:39 PM    Anion gap 5 10/15/2018 05:39 PM Glucose 94 10/15/2018 05:39 PM    BUN 16 10/15/2018 05:39 PM    Creatinine 1.03 (H) 10/15/2018 05:39 PM    BUN/Creatinine ratio 16 10/15/2018 05:39 PM    GFR est AA >60 10/15/2018 05:39 PM    GFR est non-AA 56 (L) 10/15/2018 05:39 PM    Calcium 8.6 10/15/2018 05:39 PM    Bilirubin, total 1.3 (H) 10/15/2018 05:39 PM    Alk. phosphatase 51 10/15/2018 05:39 PM    Protein, total 8.3 (H) 10/15/2018 05:39 PM    Albumin 3.7 10/15/2018 05:39 PM    Globulin 4.6 (H) 10/15/2018 05:39 PM    A-G Ratio 0.8 (L) 10/15/2018 05:39 PM    ALT (SGPT) 14 10/15/2018 05:39 PM           Assessment:     Assessment:     Diagnoses and all orders for this visit:    1. Paroxysmal atrial fibrillation (HCC)  -     AMB POC EKG ROUTINE W/ 12 LEADS, INTER & REP  -     NUCLEAR CARDIAC STRESS TEST; Future    2. Peripheral vascular disease ([facility] [address]  -     NUCLEAR CARDIAC STRESS TEST; Future    3. Venous reflux  -     NUCLEAR CARDIAC STRESS TEST; Future    4. Chest pain in adult  -     NUCLEAR CARDIAC STRESS TEST; Future        ICD-10-CM ICD-9-CM    1. Paroxysmal atrial fibrillation (HCC)  I48.0 427.31 AMB POC EKG ROUTINE W/ 12 LEADS, INTER & REP      NUCLEAR CARDIAC STRESS TEST   2. Peripheral vascular disease (HCC)  I73.9 443.9 NUCLEAR CARDIAC STRESS TEST   3. Venous reflux  I87.2 459.81 NUCLEAR CARDIAC STRESS TEST   4. Chest pain in adult  R07.9 786.50 NUCLEAR CARDIAC STRESS TEST     Orders Placed This Encounter    AMB POC EKG ROUTINE W/ 12 LEADS, INTER & REP     Order Specific Question:   Reason for Exam:     Answer:   routine        Plan:     1.  Paroxysmal atrial fibrillation: Presenting in sinus rhythm today, continue Xarelto and beta-blocker  2.  Venous reflux: L SGV insufficieny, recommend follow-up with lymphedema clinic and compression stockings.     After discussion does not seem interested in following back up with lymphedema clinic but willing to trial compression stockings and boots again after we discussed potential long-term consequences of venous dermal ulcers  3.  Hypertension: Normotensive 129/78  4.  Hx URVASHI: Defers wearing CPAP machine. 5.  Chest pain: We will evaluate for ischemia with Lexiscan nuclear stress test  Follow-up in 6 months sooner if abnormal stress test.   Jhony Sky NP      Please note that this dictation was completed with [employer], the computer voice recognition software. Quite often unanticipated grammatical, syntax, homophones, and other interpretive errors are inadvertently transcribed by the computer software. Please disregard these errors. Please excuse any errors that have escaped final proofreading. Thank you.

## 2021-11-15 ENCOUNTER — OFFICE VISIT (OUTPATIENT)
Dept: CARDIOLOGY CLINIC | Age: 56
End: 2021-11-15
Payer: MEDICARE

## 2021-11-15 VITALS
WEIGHT: 243.8 LBS | HEIGHT: 60 IN | HEART RATE: 66 BPM | SYSTOLIC BLOOD PRESSURE: 129 MMHG | OXYGEN SATURATION: 98 % | DIASTOLIC BLOOD PRESSURE: 78 MMHG | BODY MASS INDEX: 47.87 KG/M2 | RESPIRATION RATE: 19 BRPM

## 2021-11-15 DIAGNOSIS — I48.0 PAROXYSMAL ATRIAL FIBRILLATION (HCC): Primary | ICD-10-CM

## 2021-11-15 DIAGNOSIS — I73.9 PERIPHERAL VASCULAR DISEASE (HCC): ICD-10-CM

## 2021-11-15 DIAGNOSIS — R07.9 CHEST PAIN IN ADULT: ICD-10-CM

## 2021-11-15 DIAGNOSIS — I87.2 VENOUS REFLUX: ICD-10-CM

## 2021-11-15 PROCEDURE — 99214 OFFICE O/P EST MOD 30 MIN: CPT | Performed by: NURSE PRACTITIONER

## 2021-11-15 PROCEDURE — G8427 DOCREV CUR MEDS BY ELIG CLIN: HCPCS | Performed by: NURSE PRACTITIONER

## 2021-11-15 PROCEDURE — 93000 ELECTROCARDIOGRAM COMPLETE: CPT | Performed by: NURSE PRACTITIONER

## 2021-11-15 PROCEDURE — 3017F COLORECTAL CA SCREEN DOC REV: CPT | Performed by: NURSE PRACTITIONER

## 2021-11-15 PROCEDURE — G8432 DEP SCR NOT DOC, RNG: HCPCS | Performed by: NURSE PRACTITIONER

## 2021-11-15 PROCEDURE — G8417 CALC BMI ABV UP PARAM F/U: HCPCS | Performed by: NURSE PRACTITIONER

## 2021-11-15 PROCEDURE — G9899 SCRN MAM PERF RSLTS DOC: HCPCS | Performed by: NURSE PRACTITIONER

## 2021-11-15 NOTE — LETTER
11/15/2021    Patient: Jaclyn Wood   YOB: 1965   Date of Visit: 11/15/2021     Moni العراقي MD  [address] [address]  Via Fax: [phone]    Dear Moni العراقي MD,      Thank you for referring Ms. Royann Schlatter to [facility] for evaluation. My notes for this consultation are attached. If you have questions, please do not hesitate to call me. I look forward to following your patient along with you.       Sincerely,    Magui Sutton NP
I have reviewed the history, physical exam, assessment and management plans.  I concur with or have edited all elements of her note.

## 2021-11-15 NOTE — PROGRESS NOTES
1. Have you been to the ER, urgent care clinic since your last visit? Hospitalized since your last visit? No    2. Have you seen or consulted any other health care providers outside of the [address] [place] [address] since your last visit? Include any pap smears or colon screening. OB GYN, Joel Blandon MD at [facility] on August 09, 2021. Chief Complaint   Patient presents with    Follow-up     6 mo appt.  \"I feel funny feeling on my left chest, left arm goes numb\"    Leg Swelling

## 2021-12-02 RX ORDER — SPIRONOLACTONE 25 MG/1
TABLET ORAL
Qty: 90 TABLET | Refills: 0 | Status: SHIPPED | OUTPATIENT
Start: 2021-12-02 | End: 2022-03-15

## 2021-12-02 RX ORDER — METOPROLOL SUCCINATE 50 MG/1
TABLET, EXTENDED RELEASE ORAL
Qty: 90 TABLET | Refills: 0 | Status: SHIPPED | OUTPATIENT
Start: 2021-12-02 | End: 2022-03-15

## 2021-12-09 ENCOUNTER — HOSPITAL ENCOUNTER (OUTPATIENT)
Dept: NON INVASIVE DIAGNOSTICS | Age: 56
Discharge: HOME | End: 2021-12-09
Payer: MEDICARE

## 2021-12-09 ENCOUNTER — HOSPITAL ENCOUNTER (OUTPATIENT)
Dept: NUCLEAR MEDICINE | Age: 56
Discharge: HOME | End: 2021-12-09
Payer: MEDICARE

## 2021-12-09 VITALS
WEIGHT: 243 LBS | HEIGHT: 60 IN | BODY MASS INDEX: 47.71 KG/M2 | SYSTOLIC BLOOD PRESSURE: 149 MMHG | DIASTOLIC BLOOD PRESSURE: 97 MMHG

## 2021-12-09 DIAGNOSIS — I73.9 PERIPHERAL VASCULAR DISEASE (HCC): ICD-10-CM

## 2021-12-09 DIAGNOSIS — R07.9 CHEST PAIN IN ADULT: ICD-10-CM

## 2021-12-09 DIAGNOSIS — I48.0 PAROXYSMAL ATRIAL FIBRILLATION (HCC): ICD-10-CM

## 2021-12-09 DIAGNOSIS — I87.2 VENOUS REFLUX: ICD-10-CM

## 2021-12-09 LAB
STRESS BASELINE DIAS BP: 90 MMHG
STRESS BASELINE HR: 67 BPM
STRESS BASELINE SYS BP: 149 MMHG
STRESS ESTIMATED WORKLOAD: 1 METS
STRESS EXERCISE DUR MIN: NORMAL
STRESS O2 SAT PEAK: 99 %
STRESS O2 SAT REST: 100 %
STRESS PEAK DIAS BP: 90 MMHG
STRESS PEAK SYS BP: 149 MMHG
STRESS PERCENT HR ACHIEVED: 72 %
STRESS POST PEAK HR: 118 BPM
STRESS RATE PRESSURE PRODUCT: NORMAL BPM*MMHG
STRESS ST DEPRESSION: 0 MM
STRESS ST ELEVATION: 0 MM
STRESS TARGET HR: 163 BPM

## 2021-12-09 PROCEDURE — 74011250636 HC RX REV CODE- 250/636

## 2021-12-09 PROCEDURE — A9500 TC99M SESTAMIBI: HCPCS

## 2021-12-09 RX ORDER — SODIUM CHLORIDE 0.9 % (FLUSH) 0.9 %
10 SYRINGE (ML) INJECTION AS NEEDED
Status: DISCONTINUED | OUTPATIENT
Start: 2021-12-09 | End: 2021-12-13 | Stop reason: HOSPADM

## 2021-12-09 RX ORDER — TETRAKIS(2-METHOXYISOBUTYLISOCYANIDE)COPPER(I) TETRAFLUOROBORATE 1 MG/ML
11 INJECTION, POWDER, LYOPHILIZED, FOR SOLUTION INTRAVENOUS
Status: COMPLETED | OUTPATIENT
Start: 2021-12-09 | End: 2021-12-09

## 2021-12-09 RX ORDER — AMINOPHYLLINE 25 MG/ML
INJECTION, SOLUTION INTRAVENOUS
Status: DISCONTINUED
Start: 2021-12-09 | End: 2021-12-09 | Stop reason: WASHOUT

## 2021-12-09 RX ORDER — NITROGLYCERIN 0.4 MG/1
TABLET SUBLINGUAL
Status: DISCONTINUED
Start: 2021-12-09 | End: 2021-12-09 | Stop reason: WASHOUT

## 2021-12-09 RX ORDER — TETRAKIS(2-METHOXYISOBUTYLISOCYANIDE)COPPER(I) TETRAFLUOROBORATE 1 MG/ML
30 INJECTION, POWDER, LYOPHILIZED, FOR SOLUTION INTRAVENOUS
Status: COMPLETED | OUTPATIENT
Start: 2021-12-09 | End: 2021-12-09

## 2021-12-09 RX ORDER — ATROPINE SULFATE 0.1 MG/ML
INJECTION INTRAVENOUS
Status: DISCONTINUED
Start: 2021-12-09 | End: 2021-12-09 | Stop reason: WASHOUT

## 2021-12-09 RX ADMIN — TETRAKIS(2-METHOXYISOBUTYLISOCYANIDE)COPPER(I) TETRAFLUOROBORATE 30 MILLICURIE: 1 INJECTION, POWDER, LYOPHILIZED, FOR SOLUTION INTRAVENOUS at 09:30

## 2021-12-09 RX ADMIN — TETRAKIS(2-METHOXYISOBUTYLISOCYANIDE)COPPER(I) TETRAFLUOROBORATE 11 MILLICURIE: 1 INJECTION, POWDER, LYOPHILIZED, FOR SOLUTION INTRAVENOUS at 07:40

## 2021-12-09 RX ADMIN — Medication 10 ML: at 09:31

## 2021-12-09 RX ADMIN — REGADENOSON 0.4 MG: 0.08 INJECTION, SOLUTION INTRAVENOUS at 09:30

## 2021-12-14 ENCOUNTER — TELEPHONE (OUTPATIENT)
Dept: CARDIOLOGY CLINIC | Age: 56
End: 2021-12-14

## 2021-12-14 NOTE — TELEPHONE ENCOUNTER
I called and spoke with the patient. Patient's two identifiers verified. I discussed Dr. Nicole Dunne message below with the patient:  \"Stress test is negative.  Please apologize to the patient. Paige Mast will have to check why these results did not come directly to me. \"    I explained to her that Dr. Jessica Dominguez will check why the result did not go to him directly. Patient verbalized understanding. No other concerns at this time.

## 2021-12-14 NOTE — TELEPHONE ENCOUNTER
Patient called me back. Two identifiers verified. Patient stated she had her stress done on 12/09/21 and checking for the result. I will call her back. Patient requested to leave a detailed message about the result of the test on her VM if she cant  and she will call me back to confirm once received.

## 2022-01-17 DIAGNOSIS — I48.0 PAROXYSMAL ATRIAL FIBRILLATION (HCC): Primary | ICD-10-CM

## 2022-01-17 RX ORDER — RIVAROXABAN 20 MG/1
TABLET, FILM COATED ORAL
Qty: 90 TABLET | Refills: 1 | Status: SHIPPED | OUTPATIENT
Start: 2022-01-17 | End: 2022-01-18

## 2022-01-17 NOTE — TELEPHONE ENCOUNTER
Requested Prescriptions     Pending Prescriptions Disp Refills    Xarelto 20 mg tab tablet 90 Tablet 1     Sig: TAKE 1 TABLE TBY MOUTH DAILY         Seen on: 11/15/2021  Future appt: 05/16/2022      Routing Dr. Jasmin Leong.

## 2022-01-18 DIAGNOSIS — I48.0 PAROXYSMAL ATRIAL FIBRILLATION (HCC): ICD-10-CM

## 2022-01-18 RX ORDER — RIVAROXABAN 20 MG/1
TABLET, FILM COATED ORAL
Qty: 90 TABLET | Refills: 0 | Status: SHIPPED | OUTPATIENT
Start: 2022-01-18

## 2022-03-15 RX ORDER — METOPROLOL SUCCINATE 50 MG/1
TABLET, EXTENDED RELEASE ORAL
Qty: 90 TABLET | Refills: 0 | Status: SHIPPED | OUTPATIENT
Start: 2022-03-15

## 2022-03-15 RX ORDER — SPIRONOLACTONE 25 MG/1
TABLET ORAL
Qty: 90 TABLET | Refills: 0 | Status: SHIPPED | OUTPATIENT
Start: 2022-03-15

## 2022-03-18 PROBLEM — E66.01 OBESITY, MORBID (HCC): Status: ACTIVE | Noted: 2018-08-31

## 2022-03-19 PROBLEM — I48.91 ATRIAL FIBRILLATION (HCC): Status: ACTIVE | Noted: 2018-11-14

## 2022-03-19 PROBLEM — I73.9 PERIPHERAL VASCULAR DISEASE (HCC): Status: ACTIVE | Noted: 2019-12-31

## 2022-03-20 PROBLEM — I89.0 LYMPHEDEMA OF BOTH LOWER EXTREMITIES: Status: ACTIVE | Noted: 2019-12-31

## 2022-03-20 PROBLEM — I49.8 FLUTTERING HEART: Status: ACTIVE | Noted: 2018-08-31

## 2022-03-20 PROBLEM — I87.2 VENOUS REFLUX: Status: ACTIVE | Noted: 2020-04-20

## 2022-04-14 ENCOUNTER — HOSPITAL ENCOUNTER (EMERGENCY)
Age: 57
Discharge: HOME | End: 2022-04-14
Attending: EMERGENCY MEDICINE
Payer: MEDICARE

## 2022-04-14 VITALS
BODY MASS INDEX: 51.03 KG/M2 | HEIGHT: 60 IN | RESPIRATION RATE: 18 BRPM | SYSTOLIC BLOOD PRESSURE: 148 MMHG | HEART RATE: 84 BPM | OXYGEN SATURATION: 100 % | DIASTOLIC BLOOD PRESSURE: 80 MMHG | WEIGHT: 259.92 LBS | TEMPERATURE: 98.1 F

## 2022-04-14 DIAGNOSIS — M79.601 PARESTHESIA AND PAIN OF BOTH UPPER EXTREMITIES: Primary | ICD-10-CM

## 2022-04-14 DIAGNOSIS — R20.2 PARESTHESIA AND PAIN OF BOTH UPPER EXTREMITIES: Primary | ICD-10-CM

## 2022-04-14 DIAGNOSIS — M79.602 PARESTHESIA AND PAIN OF BOTH UPPER EXTREMITIES: Primary | ICD-10-CM

## 2022-04-14 PROCEDURE — 99283 EMERGENCY DEPT VISIT LOW MDM: CPT

## 2022-04-14 PROCEDURE — 99282 EMERGENCY DEPT VISIT SF MDM: CPT

## 2022-04-14 RX ORDER — METHYLPREDNISOLONE 4 MG/1
TABLET ORAL
Qty: 1 DOSE PACK | Refills: 0 | Status: SHIPPED | OUTPATIENT
Start: 2022-04-14

## 2022-04-14 NOTE — ED PROVIDER NOTES
EMERGENCY DEPARTMENT HISTORY AND PHYSICAL EXAM      Date: 4/14/2022  Patient Name: Lizette Mitchell    History of Presenting Illness     Chief Complaint   Patient presents with    Numbness     couple of weeks when she wakes up has numbness right thumb and left second and third finger; when she raise arms up this goes away but does have a pain with this. History Provided By: Patient    HPI: Lizette Mitchell, 61 y.o. female with PMHx of HTN, asthma, lymphedema, paroxsymal afib and h/o DVT on xarelto, bilateral knee arthritis, presents BIB self to the ED with cc of intermittent tingling and numb sensation of the left hand and right thumb, ongoing for the last few weeks. The patient reportedly wakes up with this sensation, and when she lifts her arms above her head her symptoms eventually resolve. Symptoms seem to be worse in the early morning, and are less bothersome during the day and in the evening. Pain seems to radiate up from the hands towards her elbows. She does not have any symptoms at present. She denies skin color changes, decreased range of motion, weakness, neck pain, headache, dizziness, speech or vision change, chest pain, shortness of breath, fevers, rashes. She is not taking anything for her pain or paresthesias. There are no other complaints, changes, or physical findings at this time. PCP: Fabián Jaquez MD    No current facility-administered medications on file prior to encounter. Current Outpatient Medications on File Prior to Encounter   Medication Sig Dispense Refill    metoprolol succinate (TOPROL-XL) 50 mg XL tablet Take 1 tablet by mouth nightly 90 Tablet 0    spironolactone (ALDACTONE) 25 mg tablet Take 1 tablet by mouth once daily 90 Tablet 0    Xarelto 20 mg tab tablet Take 1 tablet by mouth once daily 90 Tablet 0    diclofenac (VOLTAREN) 1 % gel Apply 4 g to affected area four (4) times daily.  1 Each 1    medroxyPROGESTERone (PROVERA) 10 mg tablet Take 10 mg by mouth daily. Two pills daily      triamcinolone acetonide (KENALOG) 0.1 % topical cream       furosemide (LASIX) 20 mg tablet Take 1 Tab by mouth as needed (edema). 90 Tab 1    cetirizine (ZyrTEC) 10 mg tablet Take 10 mg by mouth daily as needed for Allergies. (Patient not taking: Reported on 11/15/2021)      acetaminophen (Tylenol Arthritis Pain) 650 mg TbER Take 650 mg by mouth as needed.  traMADoL (ULTRAM) 50 mg tablet Take 50 mg by mouth every six (6) hours as needed for Pain.  albuterol (ProAir HFA) 90 mcg/actuation inhaler Take 1 Puff by inhalation every six (6) hours as needed for Wheezing.  Symbicort 80-4.5 mcg/actuation HFAA Take 2 Puffs by inhalation two (2) times daily as needed.  clotrimazole (LOTRIMIN) 1 % topical cream Apply  to affected area two (2) times daily as needed. Past History     Past Medical History:  Past Medical History:   Diagnosis Date    Asthma     COVID-19 08/2020    Environmental allergies     Hypertension     Lymphedema of both lower extremities 12/31/2019    History of secondary cellulitis as well as DVT with PTE    Thromboembolus ([address]     \"and PEs\"       Past Surgical History:  Past Surgical History:   Procedure Laterality Date    COLONOSCOPY N/A 7/30/2020    [facility]COPY    HX BREAST BIOPSY Bilateral     2004 stereo bx?     HX ORTHOPAEDIC      right hand tendon repair and screw in wrist and took piece of bone from hip f       Family History:  Family History   Problem Relation Age of Onset    Cancer Mother     Stroke Father        Social History:  Social History     Tobacco Use    Smoking status: Never Smoker    Smokeless tobacco: Never Used   Vaping Use    Vaping Use: Never used   Substance Use Topics    Alcohol use: Yes     Comment: occ    Drug use: No       Allergies:   Allergies   Allergen Reactions    Lisinopril Swelling         Review of Systems   Review of Systems Constitutional: Negative for diaphoresis and fever. Respiratory: Negative for shortness of breath. Cardiovascular: Negative for chest pain. Gastrointestinal: Negative for nausea and vomiting. Musculoskeletal: Positive for arthralgias (b/l knees). Negative for neck pain and neck stiffness. Neurological: Positive for numbness. Negative for dizziness, weakness and headaches. Hematological: Bruises/bleeds easily. All other systems reviewed and are negative. Physical Exam   Physical Exam  Vitals and nursing note reviewed. Constitutional:       General: She is not in acute distress. Appearance: Normal appearance. She is well-developed. She is obese. HENT:      Head: Normocephalic and atraumatic. Nose: Nose normal.   Eyes:      General: Lids are normal.      Extraocular Movements: Extraocular movements intact. Conjunctiva/sclera: Conjunctivae normal.      Pupils: Pupils are equal, round, and reactive to light. Neck:      Comments: No midline or paraspinal tenderness  Cardiovascular:      Rate and Rhythm: Normal rate and regular rhythm. Pulmonary:      Effort: Pulmonary effort is normal.      Breath sounds: Normal breath sounds. Abdominal:      Palpations: Abdomen is soft. Tenderness: There is no abdominal tenderness. There is no guarding. Musculoskeletal:         General: No swelling or tenderness. Normal range of motion. Cervical back: Normal range of motion and neck supple. No rigidity or tenderness. Comments: 5/5 strength and sensation b/l UE/LEs. Gait is steady and symmetrical.   Skin:     General: Skin is warm and dry. Neurological:      General: No focal deficit present. Mental Status: She is alert and oriented to person, place, and time. Cranial Nerves: No cranial nerve deficit. Sensory: No sensory deficit. Motor: No weakness.       Coordination: Coordination normal.      Gait: Gait normal.      Comments: Sensation intact to light touch throughout. Psychiatric:         Mood and Affect: Mood normal.         Behavior: Behavior normal. Behavior is cooperative. Diagnostic Study Results     Labs -   No results found for this or any previous visit (from the past 12 hour(s)). Radiologic Studies -   No orders to display     CT Results  (Last 48 hours)    None        CXR Results  (Last 48 hours)    None            Medical Decision Making   I am the first provider for this patient. I reviewed the vital signs, available nursing notes, past medical history, past surgical history, family history and social history. Vital Signs-Reviewed the patient's vital signs. Patient Vitals for the past 12 hrs:   Temp Pulse Resp BP SpO2   04/14/22 1454 98.1 °F (36.7 °C) 84 18 (!) 148/80 100 %       Records Reviewed: Nursing Notes    Provider Notes (Medical Decision Making): The patient is well-appearing and in no acute distress. Vital signs are stable. She has no midline cervical tenderness. Her neurological exam is normal and she denies having numbness or tingling at this time. Her symptoms are felt in the left hand and right thumb, predominantly in the morning time over the last few weeks. I think a cervical spine pathology/nerve compression is most likely given bilateral distribution, onset after laying in a supine position, and improvement several minutes after lifting her arms up. Also discussed other possible etiologies such as nutritional deficiency, electrolyte abnormality, peripheral neuropathy, though I think these are less likely. Not suspicious for TIA/CVA given bilateral distribution and lack of other associated symptoms. The patient is agreeable to discharge with plans for outpatient PCP follow-up, which she has scheduled for this upcoming Tuesday. She may also desire to follow-up with neurology or Ortho/spine, will provide referrals.   In the meantime, will discharge with Medrol Dosepak to see if this improves her symptoms. ED return precautions given. Patient is in agreement this plan. ED Course:   Initial assessment performed. The patients presenting problems have been discussed, and they are in agreement with the care plan formulated and outlined with them. I have encouraged them to ask questions as they arise throughout their visit. Critical Care Time: None    Disposition:  D/c    PLAN:  1. Discharge Medication List as of 4/14/2022  4:15 PM        2. Follow-up Information     Follow up With Specialties Details Why Contact Info    [facility] EMERGENCY DEPT Emergency Medicine  As needed, If symptoms worsen [address] [facility] [address]  [facility] Neurology Call  For follow up [address]    Alfonso Bansal MD Orthopedic Surgery Call  For follow up [facility] [address]  [address] [address] [phone]      Raheem Taylor MD Family Medicine  For follow up on Tuesday as scheduled [address]  [phone]          Return to ED if worse     Diagnosis     Clinical Impression:   1. Paresthesia and pain of both upper extremities          Please note that this dictation was completed with [employer], the computer voice recognition software. Quite often unanticipated grammatical, syntax, homophones, and other interpretive errors are inadvertently transcribed by the computer software. Please disregards these errors. Please excuse any errors that have escaped final proofreading.

## 2022-06-29 ENCOUNTER — OFFICE VISIT (OUTPATIENT)
Dept: NEUROLOGY | Age: 57
End: 2022-06-29
Payer: MEDICARE

## 2022-06-29 VITALS
SYSTOLIC BLOOD PRESSURE: 131 MMHG | DIASTOLIC BLOOD PRESSURE: 76 MMHG | HEART RATE: 78 BPM | BODY MASS INDEX: 50.06 KG/M2 | WEIGHT: 255 LBS | OXYGEN SATURATION: 94 % | HEIGHT: 60 IN

## 2022-06-29 DIAGNOSIS — R20.0 BILATERAL HAND NUMBNESS: Primary | ICD-10-CM

## 2022-06-29 PROCEDURE — G8427 DOCREV CUR MEDS BY ELIG CLIN: HCPCS | Performed by: PSYCHIATRY & NEUROLOGY

## 2022-06-29 PROCEDURE — 3017F COLORECTAL CA SCREEN DOC REV: CPT | Performed by: PSYCHIATRY & NEUROLOGY

## 2022-06-29 PROCEDURE — G8510 SCR DEP NEG, NO PLAN REQD: HCPCS | Performed by: PSYCHIATRY & NEUROLOGY

## 2022-06-29 PROCEDURE — 99203 OFFICE O/P NEW LOW 30 MIN: CPT | Performed by: PSYCHIATRY & NEUROLOGY

## 2022-06-29 PROCEDURE — G8417 CALC BMI ABV UP PARAM F/U: HCPCS | Performed by: PSYCHIATRY & NEUROLOGY

## 2022-06-29 NOTE — PROGRESS NOTES
Neurology Consult Note      HISTORY PROVIDED BY: patient    Chief Complaint:   Chief Complaint   Patient presents with    New Patient     fingertips, started 3 months ago, sensitive to cold and hot    Tingling      Subjective:    Landon Vera is a 61 y.o. right handed female who presents in consultation for tingling in fingers. Right thumb and left thumb, now less so on left, and 3rd and 4th fingers on left for the last 2-3 months. She may notice numbness in right thumb more if playing games on phone. Has noticed a change when in cold or heat. Felt funny in hands when picking up her grandchild, possibly weakness, not clear. No weakness. Did have pain down arms, mostly when going to sleep and would noticed tingling in hands, able to move hands and relieve sxs. Has been doing thoracic outlet exercises that she found on You Tube at the suggestion of her niece.      Past Medical History:   Diagnosis Date    Asthma     COVID-19 08/2020    Still has impaired smell (6/2022)    DVT (deep venous thrombosis) ([facility])     Environmental allergies     Hypertension     Lymphedema of both lower extremities 12/31/2019    History of secondary cellulitis as well as DVT with PTE    OA (osteoarthritis) of knee     Bilaterally, severe, using walker    Pulmonary embolism [facility])       Past Surgical History:   Procedure Laterality Date    COLONOSCOPY N/A 7/30/2020    COLONOSCOPY performed by Araseli Aguero MD at [facility] ENDOSCOPY    HX BREAST BIOPSY Bilateral     2004 stereo bx?    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 Social Determinants of Health     Financial Resource Strain:     Difficulty of Paying Living Expenses: Not on file   Food Insecurity:     Worried About Running Out of Food in the Last Year: Not on file    Margarita of Food in the Last Year: Not on file   Transportation Needs:     Lack of Transportation (Medical): Not on file    Lack of Transportation (Non-Medical):  Not on file   Physical Activity:     Days of Exercise per Week: Not on file    Minutes of Exercise per Session: Not on file   Stress:     Feeling of Stress : Not on file   Social Connections:     Frequency of Communication with Friends and Family: Not on file    Frequency of Social Gatherings with Friends and Family: Not on file    Attends [employer] Services: Not on file    Active Member of [address] [place] [address] [employer] or Organizations: Not on file    Attends Club or Organization Meetings: Not on file    Marital Status: Not on file   Intimate Partner Violence:     Fear of Current or Ex-Partner: Not on file    Emotionally Abused: Not on file    Physically Abused: Not on file    Sexually Abused: Not on file   Housing Stability:     Unable to Pay for Housing in the Last Year: Not on file    Number of [place] in the Last Year: Not on file    Unstable Housing in the Last Year: Not on file     Family History   Problem Relation Age of Onset    Cancer Mother         lung    Stroke Father         50yo    Other Father         old age   Nuria Current Other Sister         aneurysm    Liver Cancer Brother     No Known Problems Son          Objective:   Review of Systems   Cardiovascular: Positive for leg swelling. Gastrointestinal: Positive for abdominal pain. Musculoskeletal: Positive for joint pain. All other systems reviewed and are negative. Allergies   Allergen Reactions    Lisinopril Swelling        Meds:  Outpatient Medications Prior to Visit   Medication Sig Dispense Refill    methylPREDNISolone (Medrol, Felix,) 4 mg tablet Take as directed on package. 1 Dose Pack 0    metoprolol succinate (TOPROL-XL) 50 mg XL tablet Take 1 tablet by mouth nightly 90 Tablet 0    spironolactone (ALDACTONE) 25 mg tablet Take 1 tablet by mouth once daily 90 Tablet 0    Xarelto 20 mg tab tablet Take 1 tablet by mouth once daily 90 Tablet 0    diclofenac (VOLTAREN) 1 % gel Apply 4 g to affected area four (4) times daily. 1 Each 1    triamcinolone acetonide (KENALOG) 0.1 % topical cream       furosemide (LASIX) 20 mg tablet Take 1 Tab by mouth as needed (edema). 90 Tab 1    acetaminophen (Tylenol Arthritis Pain) 650 mg TbER Take 650 mg by mouth as needed.  albuterol (ProAir HFA) 90 mcg/actuation inhaler Take 1 Puff by inhalation every six (6) hours as needed for Wheezing.  Symbicort 80-4.5 mcg/actuation HFAA Take 2 Puffs by inhalation two (2) times daily as needed.  clotrimazole (LOTRIMIN) 1 % topical cream Apply  to affected area two (2) times daily as needed.  medroxyPROGESTERone (PROVERA) 10 mg tablet Take 10 mg by mouth daily. Two pills daily (Patient not taking: Reported on 6/29/2022)      cetirizine (ZyrTEC) 10 mg tablet Take 10 mg by mouth daily as needed for Allergies. (Patient not taking: Reported on 11/15/2021)      traMADoL (ULTRAM) 50 mg tablet Take 50 mg by mouth every six (6) hours as needed for Pain. (Patient not taking: Reported on 6/29/2022)       No facility-administered medications prior to visit.        Imaging:  MRI Results (most recent):  No results found for this or any previous visit. CT Results (most recent):  Results from Hospital Encounter encounter on 07/02/18    CTA CHEST W OR W WO CONT    Narrative  EXAM:  CTA CHEST W OR W WO CONT    INDICATION:   Non-anginal chest pain; cp, h/o multiple pe's    COMPARISON: None. CONTRAST: 80 mL of Isovue-370. TECHNIQUE:  Precontrast  images were obtained to localize the volume for acquisition. Multislice helical CT arteriography was performed from the diaphragm to the  thoracic inlet during uneventful rapid bolus intravenous contrast  administration. Lung and soft tissue windows were generated. Coronal and  sagittal images were generated and 3D post processing consisting of coronal  maximum intensity images was performed. CT dose reduction was achieved through  use of a standardized protocol tailored for this examination and automatic  exposure control for dose modulation. FINDINGS:  The lungs are clear of mass, nodule, airspace disease or edema. The pulmonary arteries are well enhanced and no pulmonary emboli are identified. There is no mediastinal or hilar adenopathy or mass. The aorta enhances normally  without evidence of aneurysm or dissection. The visualized portions of the upper abdominal organs are normal.    There is a partially calcified right upper outer quadrant breast mass that  measures 11 mm.     Impression  IMPRESSION: No acute chest process identified       Reviewed records in [facility] tab today    Lab Review   Results for orders placed or performed during the hospital encounter of 12/09/21   NUCLEAR CARDIAC STRESS TEST   Result Value Ref Range    Stress Target  bpm    Baseline HR 67 BPM    Baseline Systolic  mmHg    Baseline O2 Sat 100 %    Stress Percent HR Achieved 72 %    Stress Peak  BPM    Stress O2 Sat 99 %    Stress ST Elevation 0 mm    Stress ST Depression 0 mm    Baseline Diastolic BP 90 mmHg    Stress Systolic  mmHg    Stress Diastolic BP 90 mmHg    Stress Rate Pressure Product 17,582 bpm*mmHg    Exercise Duration Time 00:03:05     Stress Estimated Workload 1.0 METS        Exam:  Visit Vitals  /76   Pulse 78   Ht 5' (1.524 m)   Wt 255 lb (115.7 kg)   SpO2 94%   BMI 49.80 kg/m²     General:  Alert, cooperative, no distress. Head:  Normocephalic, without obvious abnormality, atraumatic. Respiratory:  Heart:   Non labored breathing  Regular rate and rhythm, no murmurs   Neck:   2+ carotids, no bruits   Extremities: Warm, no cyanosis or edema. Pulses: 2+ radial pulses. Neurologic:  MS: Alert and oriented x 4, speech intact. Language intact. Attention and fund of knowledge appropriate. Recent and remote memory intact. Cranial Nerves:  II: visual fields Full to confrontation   II: pupils Equal, round, reactive to light   II: optic disc    III,VII: ptosis none   III,IV,VI: extraocular muscles  EOMI, no nystagmus or diplopia   V: facial light touch sensation  normal   VII: facial muscle function   symmetric   VIII: hearing intact   IX: soft palate elevation     XI: trapezius strength     XI: sternocleidomastoid strength    XII: tongue       Motor: normal bulk and tone, no tremor              Strength: 5/5 throughout, no PD  Sensory: intact to LT, PP, neg Phalen's   Coordination: FTN and SARAN intact, HTS not attempted due to body habitus. Gait: Antalgic with seated walker  Reflexes: 3+ symmetric in UE, +Danielle's, 2+ in LE, toes downgoing           Assessment/Plan   Pt is a 61 y.o. right handed female with 2-3 month h/o numbness in bilateral thumbs, R>L, and left 3rd and 4th fingers. Has had pain down arms and possibly describing weakness.   Exam with BMI 49.8, brisk upper extremity reflexes with bilateral Danielle's, o/w non-focal. Possible mononeuropathies bilateral vs cervical radiculopathy, with hyperreflexia raising concern for cervical cord structural lesion, though no hyperreflexia in lower extremities. Will start evaluation with NCS/EMG to assess for mononeuropathies or radiculopathies, if negative will order MRI C-spine. F/u prn for now, instructed to call after testing completed if she does not hear from our office within one week. ICD-10-CM ICD-9-CM    1. Bilateral hand numbness  R20.0 782. 0 EMG NCV MOTOR WITH F/WAVE PER NERVE       Signed:   Romel Donald MD  6/29/2022

## 2022-06-29 NOTE — LETTER
6/29/2022    Patient: Kev Davis   YOB: 1965   Date of Visit: 6/29/2022     Morris Valdes MD  [address] [place] [address]  Via Fax: [phone]    Dear Morris Valdes MD,      Thank you for referring Ms. Kurtis Ruffin to [address] [place] [address] for evaluation. My notes for this consultation are attached. If you have questions, please do not hesitate to call me. I look forward to following your patient along with you.       Sincerely,    Jessica Chase MD

## 2022-07-20 ENCOUNTER — OFFICE VISIT (OUTPATIENT)
Dept: NEUROLOGY | Age: 57
End: 2022-07-20
Payer: MEDICARE

## 2022-07-20 DIAGNOSIS — G56.03 BILATERAL CARPAL TUNNEL SYNDROME: Primary | ICD-10-CM

## 2022-07-20 PROCEDURE — 95886 MUSC TEST DONE W/N TEST COMP: CPT | Performed by: PSYCHIATRY & NEUROLOGY

## 2022-07-20 PROCEDURE — 95911 NRV CNDJ TEST 9-10 STUDIES: CPT | Performed by: PSYCHIATRY & NEUROLOGY

## 2022-07-20 NOTE — PROGRESS NOTES
[facility]  [address], [address] [facility] [address]  Phone [phone] Fax [phone]  Test Date:  2022    Patient: Eliseo Salinas : 1965 Physician: Bessie Evangelista. Torey Schumacher DO   Sex: Female Height: 5' 0\" Ref Phys: Irina Elliott MD   ID#: [id number]  Weight: 255 lbs. Technician: Fanny Abbott     Patient Complaints:  Bilateral hand paresthesias    NCV & EMG Findings:  Evaluation of the left median motor and the right median motor nerves showed prolonged distal onset latency (L5.3, R5.4 ms). The left median sensory and the right median sensory nerves showed prolonged distal peak latency (L6.3, R5.7 ms) and decreased conduction velocity (Wrist-2nd Digit, L22, R25 m/s). All remaining nerves  were within normal limits. All F Wave latencies were within normal limits. All examined muscles (as indicated in the following table) showed no evidence of electrical instability. Impression:  Extensive electrodiagnostic examination of the right upper extremity and additional nerve conduction studies of the left upper extremity reveals changes most consistent with the followin. Bilateral median neuropathies at or distal to the wrists, consistent with a clinical diagnosis of carpal tunnel syndrome, at least moderate in degree electrically on the right and moderate to severe on the left. 2. No evidence of a right cervical motor radiculopathy. ___________________________  Nilda Arnel Schumacher DO        Nerve Conduction Studies  Anti Sensory Summary Table     Stim Site NR Peak (ms) Norm Peak (ms) P-T Amp (µV) Norm P-T Amp Onset (ms) Site1 Site2 Delta-P (ms) Dist (cm) [place] (m/s) Norm [place] (m/s)   Left Median Anti Sensory (2nd Digit)  32.5°C   Wrist    6.3 <3.6 11.1 >10 5.3 Wrist 2nd Digit 6.3 14.0 22 >39   Right Median Anti Sensory (2nd Digit)  32.8°C   Wrist    5.7 <3.6 10.1 >10 4.3 Wrist 2nd Digit 5.7 14.0 25 >39 Left Radial Anti Sensory (Base 1st Digit)  32.6°C   Wrist    1.8 <3.1 53.5  1.2 Wrist Base 1st Digit 1.8 0.0     Right Radial Anti Sensory (Base 1st Digit)  33.1°C   Wrist    1.5 <3.1 58.3  1.1 Wrist Base 1st Digit 1.5 0.0     Left Ulnar Anti Sensory (5th Digit)  32.6°C   Wrist    2.5 <3.7 18.0 >15.0 2.0 Wrist 5th Digit 2.5 14.0 56 >38   Right Ulnar Anti Sensory (5th Digit)  33.1°C   Wrist    2.5 <3.7 23.4 >15.0 1.9 Wrist 5th Digit 2.5 14.0 56 >38     Motor Summary Table     Stim Site NR Onset (ms) Norm Onset (ms) O-P Amp (mV) Norm O-P Amp Site1 Site2 Delta-0 (ms) Dist (cm) [place] (m/s) Norm [place] (m/s)   Left Median Motor (Abd Poll Brev)  32.7°C   Wrist    5.3 <4.2 5.4 >5 Elbow Wrist 4.5 23.0 51 >50   Elbow    9.8  4.1          Right Median Motor (Abd Poll Brev)  33.1°C   Wrist    5.4 <4.2 5.5 >5 Elbow Wrist 4.3 23.0 53 >50   Elbow    9.7  3.8          Left Ulnar Motor (Abd Dig Minimi)  33.7°C   Wrist    2.8 <4.2 9.0 >3 B Elbow Wrist 2.5 15.0 60 >53   B Elbow    5.3  8.6  A Elbow B Elbow 1.6 10.0 62 >53   A Elbow    6.9  7.6          Right Ulnar Motor (Abd Dig Minimi)  33.6°C   Wrist    2.7 <4.2 9.9 >3 B Elbow Wrist 2.3 15.0 65 >53   B Elbow    5.0  9.5  A Elbow B Elbow 1.9 10.0 53 >53   A Elbow    6.9  8.4            F Wave Studies     NR F-Lat (ms) Lat Norm (ms) L-R F-Lat (ms) L-R Lat Norm   Left Ulnar (Mrkrs) (Abd Dig Min)  33.9°C      23.59 <36  <2.5     EMG     Side Muscle Nerve Root Ins Act Fibs Psw Amp Dur Poly Recrt Int Yvonnie Ropes Comment   Right 1stDorInt Ulnar C8-T1 Nml Nml Nml Nml Nml 0 Nml Nml    Right Abd Poll Brev Median C8-T1 Nml Nml Nml Nml Nml 0 Nml Nml    Right FlexPolLong Median (Ant Int) C7-8 Nml Nml Nml Nml Nml 0 Nml Nml    Right Ext Indicis Radial (Post Int) C7-8 Nml Nml Nml Nml Nml 0 Nml Nml    Right Biceps Musculocut C5-6 Nml Nml Nml Nml Nml 0 Nml Nml    Right Triceps Radial C6-7-8 Nml Nml Nml Nml Nml 0 Nml Nml    Right Deltoid Axillary C5-6 Nml Nml Nml Nml Nml 0 Nml Nml          Waveforms:

## 2022-08-01 ENCOUNTER — TELEPHONE (OUTPATIENT)
Dept: NEUROLOGY | Age: 57
End: 2022-08-01

## 2022-08-01 DIAGNOSIS — G56.03 BILATERAL CARPAL TUNNEL SYNDROME: Primary | ICD-10-CM

## 2022-08-01 DIAGNOSIS — R20.0 BILATERAL HAND NUMBNESS: ICD-10-CM

## 2022-08-03 NOTE — TELEPHONE ENCOUNTER
Called pt and discussed mod CTS on right mod to severe on left. Recommend CTS brace at bedtime to both wrists and OT. She is instructed to call if no improvement with OT or if sxs worsen, then will refer to hand surgeon. Marguerite - Send referral to [facility] on [facility] for OT for bilateral CTS.

## 2022-08-04 NOTE — TELEPHONE ENCOUNTER
Referral faxed to [facility], with OV Note, Demographics and Insurance Card. Confirmation received.

## 2023-01-09 ENCOUNTER — HOSPITAL ENCOUNTER (EMERGENCY)
Age: 58
Discharge: HOME | End: 2023-01-09
Attending: EMERGENCY MEDICINE
Payer: MEDICARE

## 2023-01-09 VITALS
OXYGEN SATURATION: 100 % | SYSTOLIC BLOOD PRESSURE: 183 MMHG | HEART RATE: 98 BPM | WEIGHT: 240 LBS | BODY MASS INDEX: 47.12 KG/M2 | DIASTOLIC BLOOD PRESSURE: 97 MMHG | HEIGHT: 60 IN | TEMPERATURE: 97.9 F

## 2023-01-09 DIAGNOSIS — H10.31 ACUTE CONJUNCTIVITIS OF RIGHT EYE, UNSPECIFIED ACUTE CONJUNCTIVITIS TYPE: Primary | ICD-10-CM

## 2023-01-09 PROCEDURE — 99283 EMERGENCY DEPT VISIT LOW MDM: CPT

## 2023-01-09 PROCEDURE — 74011000250 HC RX REV CODE- 250: Performed by: PHYSICIAN ASSISTANT

## 2023-01-09 RX ORDER — GENTAMICIN SULFATE 3 MG/ML
1 SOLUTION/ DROPS OPHTHALMIC
Status: COMPLETED | OUTPATIENT
Start: 2023-01-09 | End: 2023-01-09

## 2023-01-09 RX ORDER — GENTAMICIN SULFATE 3 MG/ML
1 SOLUTION/ DROPS OPHTHALMIC 4 TIMES DAILY
Qty: 15 ML | Refills: 0 | Status: SHIPPED | OUTPATIENT
Start: 2023-01-09

## 2023-01-09 RX ORDER — TRAMADOL HYDROCHLORIDE 50 MG/1
50 TABLET ORAL
COMMUNITY

## 2023-01-09 RX ADMIN — GENTAMICIN SULFATE 1 DROP: 3 SOLUTION/ DROPS OPHTHALMIC at 23:43

## 2023-01-10 NOTE — ED NOTES
Discharge instructions given to patient by Nisa Hinds. Verbalized understanding of instructions. Patient discharged without difficulty. Patient discharged in stable condition via ambulation accompanied by self.

## 2023-01-10 NOTE — ED PROVIDER NOTES
[facility] EMERGENCY DEPT  EMERGENCY DEPARTMENT ENCOUNTER       Pt Name: Anu Del Cid  MRN: [id number]  [place] 1965  Date of evaluation: 1/9/2023  Provider: CARLIE Valles   PCP: Rick Chan MD  Note Started: 11:06 PM 1/9/23     CHIEF COMPLAINT       Chief Complaint   Patient presents with    Eye Pain        HISTORY OF PRESENT ILLNESS: 1 or more elements      History From: Patient  HPI Limitations : None     Anu Del Cid is a 61 y.o. female who presents by POV with irritation to the right eye. This started yesterday after spending the night with her granddaughter. She reports that her granddaughter was sneezing and coughing in her face. Patient notes that her eye is red, slightly itchy, and photophobic. She has no change in vision. There is been no drainage. She denies foreign body sensation. She reports some pain but to the eyelid and not eyeball. She wears glasses but does not use corrective lenses. There is been no treatment prior to arrival.     Nursing Notes were all reviewed and agreed with or any disagreements were addressed in the HPI. REVIEW OF SYSTEMS      Review of Systems   Constitutional:  Negative for chills, diaphoresis and fever. HENT:  Negative for congestion, ear pain, rhinorrhea and sore throat. Eyes:  Positive for photophobia, pain and redness. Negative for discharge, itching and visual disturbance. Respiratory:  Negative for cough and shortness of breath. Cardiovascular:  Negative for chest pain. Gastrointestinal:  Negative for abdominal pain, constipation, diarrhea, nausea and vomiting. Genitourinary:  Negative for difficulty urinating, dysuria, frequency and hematuria. Musculoskeletal:  Negative for arthralgias and myalgias. Neurological:  Negative for headaches. All other systems reviewed and are negative. Positives and Pertinent negatives as per HPI.     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 HX ORTHOPAEDIC      right hand tendon repair and screw in wrist and took piece of bone from hip f       Family History:  Family History   Problem Relation Age of Onset    Cancer Mother         lung    Stroke Father         49yo    Other Father         old age    Other Sister         aneurysm    Liver Cancer Brother     No Known Problems Son        Social History:  Social History     Tobacco Use    Smoking status: Never    Smokeless tobacco: Never   Vaping Use    Vaping Use: Never used   Substance Use Topics    Alcohol use: Yes     Alcohol/week: 21.0 standard drinks     Types: 21 Glasses of wine per week    Drug use: Not Currently     Types: Marijuana     Comment: rarely, is smoking CBD       Allergies: Allergies   Allergen Reactions    Lisinopril Swelling       CURRENT MEDICATIONS      Previous Medications    ACETAMINOPHEN (TYLENOL) 650 MG TBER    Take 650 mg by mouth as needed. ALBUTEROL (PROVENTIL HFA, VENTOLIN HFA, PROAIR HFA) 90 MCG/ACTUATION INHALER    Take 1 Puff by inhalation every six (6) hours as needed for Wheezing. CLOTRIMAZOLE (LOTRIMIN) 1 % TOPICAL CREAM    Apply  to affected area two (2) times daily as needed. DICLOFENAC (VOLTAREN) 1 % GEL    Apply 4 g to affected area four (4) times daily. FUROSEMIDE (LASIX) 20 MG TABLET    Take 1 Tab by mouth as needed (edema).     METOPROLOL SUCCINATE (TOPROL-XL) 50 MG XL TABLET    Take 1 tablet by mouth nightly    SPIRONOLACTONE (ALDACTONE) 25 MG TABLET    Take 1 tablet by mouth once daily    SYMBICORT 80-4.5 MCG/ACTUATION HFAA    Take 2 Puffs by inhalation two (2) times daily as needed. TRAMADOL (ULTRAM) 50 MG TABLET    Take 50 mg by mouth every six (6) hours as needed for Pain. TRIAMCINOLONE ACETONIDE (KENALOG) 0.1 % TOPICAL CREAM        XARELTO 20 MG TAB TABLET    Take 1 tablet by mouth once daily       SCREENINGS               No data recorded         PHYSICAL EXAM      ED Triage Vitals [01/09/23 1957]   ED Encounter Vitals Group      BP (!) 183/97      Pulse (Heart Rate) 98      Resp       Temp 97.9 °F (36.6 °C)      Temp src       O2 Sat (%) 100 %      Weight 240 lb      Height 5'        Physical Exam  Vitals and nursing note reviewed. Constitutional:       General: She is not in acute distress. Appearance: She is well-developed. She is not diaphoretic. Comments: Very pleasant 61 y.o. -American female    HENT:      Head: Normocephalic and atraumatic. Eyes:      General:         Right eye: No discharge. Left eye: No discharge. Extraocular Movements: Extraocular movements intact. Conjunctiva/sclera:      Right eye: Right conjunctiva is injected. No chemosis. Left eye: Left conjunctiva is not injected. No chemosis. Pupils: Pupils are equal, round, and reactive to light. Cardiovascular:      Rate and Rhythm: Normal rate and regular rhythm. Heart sounds: Normal heart sounds. No murmur heard. Pulmonary:      Effort: Pulmonary effort is normal. No respiratory distress. Breath sounds: Normal breath sounds. Musculoskeletal:      Cervical back: Normal range of motion and neck supple. Skin:     General: Skin is warm and dry. Neurological:      Mental Status: She is alert and oriented to person, place, and time.    Psychiatric:         Behavior: Behavior normal.        DIAGNOSTIC RESULTS   LABS:     No results found for this or any previous visit (from the past 12 hour(s)). EKG: When ordered, EKG's are interpreted by the Emergency Department Physician in the absence of a cardiologist.  Please see their note for interpretation of EKG. RADIOLOGY:  Non-plain film images such as CT, Ultrasound and MRI are read by the radiologist. Plain radiographic images are visualized and preliminarily interpreted by the ED Provider with the below findings:     N/A     Interpretation per the Radiologist below, if available at the time of this note:     No results found. PROCEDURES   Unless otherwise noted below, none  Procedures     CRITICAL CARE TIME   none    EMERGENCY DEPARTMENT COURSE and DIFFERENTIAL DIAGNOSIS/MDM   Vitals:    Vitals:    01/09/23 1957   BP: (!) 183/97   Pulse: 98   Temp: 97.9 °F (36.6 °C)   SpO2: 100%   Weight: 108.9 kg (240 lb)   Height: 5' (1.524 m)        Patient was given the following medications:  Medications   gentamicin (GARAMYCIN) 0.3 % ophthalmic solution 1 Drop (1 Drop Right Eye Given 1/9/23 0862)       CONSULTS: (Who and What was discussed)  None    Chronic Conditions: noncontributory     Social Determinants affecting Dx or Tx: None    Records Reviewed (source and summary of external records): Nursing Notes    CC/HPI Summary, DDx, ED Course, and Reassessment: Patient presents ED with erythema and itching to her right eye. There is been no known trauma or foreign body sensation. Likely diagnosis is conjunctivitis due to recent exposure. Will treat with antibiotic drops and patient should follow-up with primary care medicine if not getting better. She can always return to the ED for deterioration. Disposition Considerations (Tests not done, Shared Decision Making, Pt Expectation of Test or Tx.): Consider doing Woods lamp evaluation for corneal abrasion versus foreign body. However, patient has no change in vision, no foreign body sensation, and no injury.   She feels that this is more of an infectious etiology and agrees with treatment for pinkeye. FINAL IMPRESSION     1. Acute conjunctivitis of right eye, unspecified acute conjunctivitis type          DISPOSITION/PLAN       Discharge Note: The patient is stable for discharge home. The signs, symptoms, diagnosis, and discharge instructions have been discussed, understanding conveyed, and agreed upon. The patient is to follow up as recommended or return to ER should their symptoms worsen. PATIENT REFERRED TO:  Follow-up Information       Follow up With Specialties Details Why Contact Info    Tess Parsons MD Family Medicine In 1 week As needed, If not improved [address] [place] [address]  [facility] [phone]      [facility] EMERGENCY DEPT Emergency Medicine  If symptoms worsen [address] [place] [address]  [phone]              DISCHARGE MEDICATIONS:  Discharge Medication List as of 1/9/2023 11:54 PM        START taking these medications    Details   gentamicin (GARAMYCIN) 0.3 % ophthalmic solution Administer 1 Drop to right eye four (4) times daily. , Normal, Disp-15 mL, R-0           CONTINUE these medications which have NOT CHANGED    Details   traMADoL (ULTRAM) 50 mg tablet Take 50 mg by mouth every six (6) hours as needed for Pain., Historical Med      metoprolol succinate (TOPROL-XL) 50 mg XL tablet Take 1 tablet by mouth nightly, Normal, Disp-90 Tablet, R-0      spironolactone (ALDACTONE) 25 mg tablet Take 1 tablet by mouth once daily, Normal, Disp-90 Tablet, R-0      Xarelto 20 mg tab tablet Take 1 tablet by mouth once daily, Normal, Disp-90 Tablet, R-0, LEON      diclofenac (VOLTAREN) 1 % gel Apply 4 g to affected area four (4) times daily. , Normal, Disp-1 Each, R-1      triamcinolone acetonide (KENALOG) 0.1 % topical cream Historical Med      furosemide (LASIX) 20 mg tablet Take 1 Tab by mouth as needed (edema). , Normal, Disp-90 Tab,R-1      acetaminophen (TYLENOL) 650 mg TbER Take 650 mg by mouth as needed., Historical Med      albuterol (PROVENTIL HFA, VENTOLIN HFA, PROAIR HFA) 90 mcg/actuation inhaler Take 1 Puff by inhalation every six (6) hours as needed for Wheezing., Historical Med      Symbicort 80-4.5 mcg/actuation HFAA Take 2 Puffs by inhalation two (2) times daily as needed., Historical Med, LEON      clotrimazole (LOTRIMIN) 1 % topical cream Apply  to affected area two (2) times daily as needed., Historical Med               DISCONTINUED MEDICATIONS:  Discharge Medication List as of 1/9/2023 11:54 PM          Shared Not Shared FREDDIE: I have seen and evaluated the patient. My supervision physician was available for consultation. I am the Primary Clinician of Record. CARLIE Ward (electronically signed)    (Please note that parts of this dictation were completed with voice recognition software. Quite often unanticipated grammatical, syntax, homophones, and other interpretive errors are inadvertently transcribed by the computer software. Please disregards these errors.  Please excuse any errors that have escaped final proofreading.)

## 2023-01-10 NOTE — ED TRIAGE NOTES
Pt ambulatory to triage with cane. Pt c/o right eye pain, photophobia, and redness since exposure to sick grand child since yesterday. Pt denies eye discharge, does not believe she has a foreign body.

## 2023-01-10 NOTE — DISCHARGE INSTRUCTIONS
It was a pleasure taking care of you at [facility] Emergency Department today. We know that when you come to [facility], you are entrusting us with your health, comfort, and safety. Our physicians and nurses honor that trust, and we truly appreciate the opportunity to care for you and your loved ones. We also value your feedback. If you receive a survey about your Emergency Department experience today, please fill it out. We care about our patients' feedback, and we listen to what you have to say. Thank you!

## 2023-01-26 ENCOUNTER — OFFICE VISIT (OUTPATIENT)
Dept: NEUROLOGY | Age: 58
End: 2023-01-26
Payer: MEDICARE

## 2023-01-26 VITALS
HEART RATE: 76 BPM | BODY MASS INDEX: 49.08 KG/M2 | OXYGEN SATURATION: 97 % | DIASTOLIC BLOOD PRESSURE: 90 MMHG | HEIGHT: 60 IN | WEIGHT: 250 LBS | SYSTOLIC BLOOD PRESSURE: 142 MMHG

## 2023-01-26 DIAGNOSIS — G56.03 BILATERAL CARPAL TUNNEL SYNDROME: Primary | ICD-10-CM

## 2023-01-26 DIAGNOSIS — R20.0 BILATERAL HAND NUMBNESS: ICD-10-CM

## 2023-01-26 PROCEDURE — 99214 OFFICE O/P EST MOD 30 MIN: CPT | Performed by: PSYCHIATRY & NEUROLOGY

## 2023-01-26 PROCEDURE — G8510 SCR DEP NEG, NO PLAN REQD: HCPCS | Performed by: PSYCHIATRY & NEUROLOGY

## 2023-01-26 PROCEDURE — G8427 DOCREV CUR MEDS BY ELIG CLIN: HCPCS | Performed by: PSYCHIATRY & NEUROLOGY

## 2023-01-26 PROCEDURE — G8417 CALC BMI ABV UP PARAM F/U: HCPCS | Performed by: PSYCHIATRY & NEUROLOGY

## 2023-01-26 PROCEDURE — 3017F COLORECTAL CA SCREEN DOC REV: CPT | Performed by: PSYCHIATRY & NEUROLOGY

## 2023-01-26 NOTE — PROGRESS NOTES
Neurology Clinic Follow up Note    Patient ID:  Briseida Paul  [id number]  61 y.o.  1965      Ms. Moisés Lester is here for follow up today of  Chief Complaint   Patient presents with    Other     Both wrists have pain, tingling and numbness  former Pt of Dr. Almanza Shown          Last Appointment With Me:  None, former pt Dr. Almanza Shown    Interval History:   Pt returns for follow up of b/l wrist pain/hand paresthesias related to b/l CTS moderate to severe. She is wearing braces b/l during the morning and evening. She also engages in passive stretching exercises with some relief. Paresthesias are worse on the left side. No associated hand weakness. PMHx/ PSHx/ FHx/ SHx:  Reviewed and unchanged previous visit. 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 Objective:       Meds:  Current Outpatient Medications   Medication Sig Dispense Refill    traMADoL (ULTRAM) 50 mg tablet Take 50 mg by mouth every six (6) hours as needed for Pain.      metoprolol succinate (TOPROL-XL) 50 mg XL tablet Take 1 tablet by mouth nightly 90 Tablet 0    spironolactone (ALDACTONE) 25 mg tablet Take 1 tablet by mouth once daily 90 Tablet 0    Xarelto 20 mg tab tablet Take 1 tablet by mouth once daily 90 Tablet 0    diclofenac (VOLTAREN) 1 % gel Apply 4 g to affected area four (4) times daily. 1 Each 1    triamcinolone acetonide (KENALOG) 0.1 % topical cream       furosemide (LASIX) 20 mg tablet Take 1 Tab by mouth as needed (edema).  90 Tab 1    acetaminophen (TYLENOL) 650 mg TbER Take 650 mg by mouth as needed. albuterol (PROVENTIL HFA, VENTOLIN HFA, PROAIR HFA) 90 mcg/actuation inhaler Take 1 Puff by inhalation every six (6) hours as needed for Wheezing. Symbicort 80-4.5 mcg/actuation HFAA Take 2 Puffs by inhalation two (2) times daily as needed. clotrimazole (LOTRIMIN) 1 % topical cream Apply  to affected area two (2) times daily as needed. Exam:  Visit Vitals  BP (!) 142/90   Pulse 76   Ht 5' (1.524 m)   Wt 250 lb (113.4 kg)   SpO2 97%   BMI 48.82 kg/m²     NEUROLOGICAL EXAM:  General: Awake, alert, speech fluent  CN: PERRL, EOMI without nystagmus, VFF to confrontation, facial sensation and strength are normal and symmetric, hearing is intact to finger rub bilaterally, palate and tongue movements are intact and symmetric. Motor: Normal tone, bulk and strength bilaterally. Reflexes: 2/4 and symmetric, plantar stimulation is flexor. Coordination: FNF, SARAN, HTS intact. Sensation: LT intact throughout. Gait: Slow, slightly antalgic appearing, uses cane to assist.    LABS  Results for orders placed or performed during the hospital encounter of 21   NUCLEAR CARDIAC STRESS TEST   Result Value Ref Range    Stress Target  bpm    Baseline HR 67 BPM    Baseline Systolic  mmHg    Baseline O2 Sat 100 %    Stress Percent HR Achieved 72 %    Stress Peak  BPM    Stress O2 Sat 99 %    Stress ST Elevation 0 mm    Stress ST Depression 0 mm    Baseline Diastolic BP 90 mmHg    Stress Systolic  mmHg    Stress Diastolic BP 90 mmHg    Stress Rate Pressure Product 17,582 bpm*mmHg    Exercise Duration Time 00:03:05     Stress Estimated Workload 1.0 METS       IMAGING:  MRI Results (most recent):  No results found for this or any previous visit. EMG:  Impression:  Extensive electrodiagnostic examination of the right upper extremity and additional nerve conduction studies of the left upper extremity reveals changes most consistent with the followin.  Bilateral median neuropathies at or distal to the wrists, consistent with a clinical diagnosis of carpal tunnel syndrome, at least moderate in degree electrically on the right and moderate to severe on the left. 2. No evidence of a right cervical motor radiculopathy. Assessment:     Encounter Diagnoses     ICD-10-CM ICD-9-CM   1. Bilateral carpal tunnel syndrome  G56.03 354.0   2. Bilateral hand numbness  R20.0 66.0   61year old AAF seen in follow today for progressive hand pain/paresthesias. EMG c/w b/l median neuropathy (CTS), moderate to severe in the left and at least moderate in the right. She has not found significant relief with conservative management (wrist bracing, passive stretching). Discussed next steps which would include referral to hand surgery for consideration of CTS release. I have provided her with a referral today. Plan:   Referral to hand surgery for b/l CTS and related paresthesias      Follow-up and Dispositions    Return if symptoms worsen or fail to improve. I have discussed the diagnosis with the patient today and the intended plan as seen in the above orders with both the patient as well as referring provider and/or PCP via electronic correspondence. The patient has received an after-visit summary and questions were answered concerning future plans. I have discussed medication side effects and warnings with the patient as well.       Signed:  Sabi Espinoza DO  1/26/2023  10:47 AM

## 2023-01-30 ENCOUNTER — DOCUMENTATION ONLY (OUTPATIENT)
Dept: NEUROLOGY | Age: 58
End: 2023-01-30

## 2023-03-13 DIAGNOSIS — M17.0 BILATERAL PRIMARY OSTEOARTHRITIS OF KNEE: Primary | ICD-10-CM

## 2023-03-14 ENCOUNTER — HOSPITAL ENCOUNTER (OUTPATIENT)
Dept: GENERAL RADIOLOGY | Age: 58
Discharge: HOME | End: 2023-03-14
Payer: MEDICARE

## 2023-03-14 DIAGNOSIS — M17.0 BILATERAL PRIMARY OSTEOARTHRITIS OF KNEE: ICD-10-CM

## 2023-03-14 PROCEDURE — 73564 X-RAY EXAM KNEE 4 OR MORE: CPT

## 2023-03-16 ENCOUNTER — TELEPHONE (OUTPATIENT)
Dept: NEUROLOGY | Age: 58
End: 2023-03-16

## 2023-03-16 NOTE — TELEPHONE ENCOUNTER
Called and spoke to the Pt. Cancelling the next appt. With Dr. Gabriella Young she was to follow up with Dr. Leti Ramirez hand surgeon. Gave he the number to call.

## 2023-03-17 ENCOUNTER — OFFICE VISIT (OUTPATIENT)
Dept: ORTHOPEDIC SURGERY | Age: 58
End: 2023-03-17

## 2023-03-17 VITALS
TEMPERATURE: 98 F | BODY MASS INDEX: 47.12 KG/M2 | OXYGEN SATURATION: 97 % | HEIGHT: 60 IN | WEIGHT: 240 LBS | DIASTOLIC BLOOD PRESSURE: 87 MMHG | HEART RATE: 79 BPM | SYSTOLIC BLOOD PRESSURE: 122 MMHG

## 2023-03-17 DIAGNOSIS — M17.0 BILATERAL PRIMARY OSTEOARTHRITIS OF KNEE: Primary | ICD-10-CM

## 2023-03-17 DIAGNOSIS — E66.01 MORBID OBESITY WITH BMI OF 45.0-49.9, ADULT (HCC): ICD-10-CM

## 2023-03-17 NOTE — LETTER
3/17/2023    Patient: Cristine Silverman   YOB: 1965   Date of Visit: 3/17/2023     Al Schwartz MD  [address] [address]  Via Fax: [phone]     Noelle Mortimer, RT  Via In Basket    Dear Dutch Cabal, MD Noelle Mortimer, RT,      Thank you for referring Ms. Jefry Henley to [facility] for evaluation. My notes for this consultation are attached. If you have questions, please do not hesitate to call me. I look forward to following your patient along with you.       Sincerely,    Theresa Esparza, DO

## 2023-03-17 NOTE — PROGRESS NOTES
3/17/2023      CC: bilateral knee pain    HPI:      This is a 61y.o. year old female who presents for a follow up visit. The patient was last seen and diagnosed with bilateral knee osteoarthritis. The patient's treatments since the most recent visit have comprised of tramadol. The patient has had no relief of the chief complaint. PMH:  Past Medical History:   Diagnosis Date    Asthma     COVID-19 08/2020    Still has impaired smell (6/2022)    DVT (deep venous thrombosis) ([facility])     Environmental allergies     Hypertension     Lymphedema of both lower extremities 12/31/2019    History of secondary cellulitis as well as DVT with PTE    OA (osteoarthritis) of knee     Bilaterally, severe, using walker    Pulmonary embolism [facility])        PSxHx:  Past Surgical History:   Procedure Laterality Date    COLONOSCOPY N/A 7/30/2020    COLONOSCOPY performed by Abelino Kidd MD at [facility] ENDOSCOPY    HX BREAST BIOPSY Bilateral     2004 stereo bx? HX ORTHOPAEDIC      right hand tendon repair and screw in wrist and took piece of bone from hip f       Meds:    Current Outpatient Medications:     traMADoL (ULTRAM) 50 mg tablet, Take 50 mg by mouth every six (6) hours as needed for Pain., Disp: , Rfl:     metoprolol succinate (TOPROL-XL) 50 mg XL tablet, Take 1 tablet by mouth nightly, Disp: 90 Tablet, Rfl: 0    spironolactone (ALDACTONE) 25 mg tablet, Take 1 tablet by mouth once daily, Disp: 90 Tablet, Rfl: 0    Xarelto 20 mg tab tablet, Take 1 tablet by mouth once daily, Disp: 90 Tablet, Rfl: 0    diclofenac (VOLTAREN) 1 % gel, Apply 4 g to affected area four (4) times daily. , Disp: 1 Each, Rfl: 1    triamcinolone acetonide (KENALOG) 0.1 % topical cream, , Disp: , Rfl:     furosemide (LASIX) 20 mg tablet, Take 1 Tab by mouth as needed (edema). , Disp: 90 Tab, Rfl: 1    acetaminophen (TYLENOL) 650 mg TbER, Take 650 mg by mouth as needed. , Disp: , Rfl:     albuterol (PROVENTIL HFA, VENTOLIN HFA, PROAIR HFA) 90 mcg/actuation inhaler, Take 1 Puff by inhalation every six (6) hours as needed for Wheezing., Disp: , Rfl:     Symbicort 80-4.5 mcg/actuation HFAA, Take 2 Puffs by inhalation two (2) times daily as needed. , Disp: , Rfl:     clotrimazole (LOTRIMIN) 1 % topical cream, Apply  to affected area two (2) times daily as needed. 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 Family Hx:  Family History   Problem Relation Age of Onset    Cancer Mother         lung    Stroke Father         51yo    Other Father         old age    Other Sister         aneurysm    Liver Cancer Brother     No Known Problems Son          Review of Systems:       General: Denies headache, lethargy, fever, weight loss  Ears/Nose/Throat: Denies ear discharge, drainage, nosebleeds, hoarse voice, dental problems  Cardiovascular: Denies chest pain, shortness of breath  Lungs: Denies chest pain, breathing problems, wheezing, pneumonia  Stomach: Denies stomach pain, heartburn, constipation, irritable bowel  Skin: Denies rash, sores, open wounds  Musculoskeletal: bilateral knee pain  Genitourinary: Denies dysuria, hematuria, polyuria  Gastrointestinal: Denies constipation, obstipation, diarrhea  Neurological: Denies changes in sight, smell, hearing, taste, seizures.  Denies loss of consciousness. Psychiatric: Denies depression, sleep pattern changes, anxiety, change in personality  Endocrine: Denies mood swings, heat or cold intolerance  Hematologic/Lymphatic: Denies anemia, purpura, petechia  Allergic/Immunologic: Denies swelling of throat, pain or swelling at lymph nodes      Physical Examination:    Visit Vitals  /87 (BP 1 Location: Right upper arm, BP Patient Position: Sitting, BP Cuff Size: Large adult)   Pulse 79   Temp 98 °F (36.7 °C) (Temporal)   Ht 5' (1.524 m)   Wt 240 lb (108.9 kg)   SpO2 97%   BMI 46.87 kg/m²        General: AOX3, no apparent distress  Psychiatric: mood and affect appropriate  Lungs: breathing is symmetric and unlabored bilaterally  Heart: regular rate and rhythm  Abdomen: no guarding  Head: normocephalic, atraumatic  Skin: No significant abnormalities, good turgor  Sensation intact to light touch: C5-T1 dermatomes  Muscular exam: 5/5 strength in all major muscle groups unless noted in specialty exam.    Extremities        Left lower extremity:  Knee is noted to have a mild effusion. medial joint line tenderness to palpation with a varus deformity. Patellar crepitus with range of motion is noted. Range of motion is 0-90. Sensation is intact to light touch L1-S1 dermatomes. Knee flexion and extension strength is 5/5 with Tibialis anterior, EHL, FHL being 5/5 as well. No other gross deformity or deficit is noted. Right lower extremity: Knee is noted to have a mild effusion. Medial joint line tenderness to palpation with a negative deformity. Patellar crepitus with range of motion is noted. Range of motion is 0-90. Sensation is intact to light touch L1-S1 dermatomes. Knee flexion and extension strength is 5/5 with Tibialis anterior, EHL, FHL being 5/5 as well. No other gross deformity or deficit is noted.           Diagnostics:    Pertinent Diagnostics: Xrays are available of the bilateral knee, ordered and reviewed by myself, they indicate severe osteoarthritis of the knee joints, no significant other findings, no other osseus abnormalities, fractures, or dislocations. Assessment: Bilateral knee Osteoarthritis  Plan: This patient I had a long discussion regarding treatment options. We went over the nonoperative options, continued medications, injections of multiple types, bracing, physical therapy, maintenance of ideal body weight. Patient has elected to proceed with weight loss efforts, I put in a referral for nutritionist.  She is aware that she needs a knee replacement at some point, but we need to have her as safe as possible. She may do viscosupplementation when her weight comes down little bit more. Ms. Antonia Bright has a reminder for a \"due or due soon\" health maintenance. I have asked that she contact her primary care provider for follow-up on this health maintenance.

## 2023-03-17 NOTE — PROGRESS NOTES
Identified pt with two pt identifiers (name and ). Reviewed chart in preparation for visit and have obtained necessary documentation. Kraig Rosales is a 61 y.o. female  Chief Complaint   Patient presents with    Knee Pain     John Knee pain     Visit Vitals  /87 (BP 1 Location: Right upper arm, BP Patient Position: Sitting, BP Cuff Size: Large adult)   Pulse 79   Temp 98 °F (36.7 °C) (Temporal)   Ht 5' (1.524 m)   Wt 240 lb (108.9 kg)   SpO2 97%   BMI 46.87 kg/m²     1. Have you been to the ER, urgent care clinic since your last visit? Hospitalized since your last visit? Yes Where: [facility]    2. Have you seen or consulted any other health care providers outside of the [address] [place] [address] since your last visit? Include any pap smears or colon screening.  No

## 2023-04-13 ENCOUNTER — HOSPITAL ENCOUNTER (OUTPATIENT)
Dept: NUTRITION | Age: 58
Discharge: HOME | End: 2023-04-13
Payer: MEDICARE

## 2023-04-13 PROCEDURE — 97802 MEDICAL NUTRITION INDIV IN: CPT | Performed by: DIETITIAN, REGISTERED

## 2023-05-05 DIAGNOSIS — E66.01 MORBID (SEVERE) OBESITY DUE TO EXCESS CALORIES (HCC): Primary | ICD-10-CM

## 2023-05-25 ENCOUNTER — HOSPITAL ENCOUNTER (OUTPATIENT)
Facility: HOSPITAL | Age: 58
Setting detail: RECURRING SERIES
Discharge: HOME | End: 2023-05-28
Payer: MEDICARE

## 2023-05-25 PROCEDURE — 97803 MED NUTRITION INDIV SUBSEQ: CPT | Performed by: DIETITIAN, REGISTERED

## 2023-06-29 ENCOUNTER — HOSPITAL ENCOUNTER (OUTPATIENT)
Facility: HOSPITAL | Age: 58
Setting detail: RECURRING SERIES
End: 2023-06-29
Payer: MEDICARE

## 2023-06-29 PROCEDURE — 97803 MED NUTRITION INDIV SUBSEQ: CPT | Performed by: DIETITIAN, REGISTERED

## 2023-07-20 ENCOUNTER — HOSPITAL ENCOUNTER (OUTPATIENT)
Facility: HOSPITAL | Age: 58
Setting detail: RECURRING SERIES
Discharge: HOME | End: 2023-07-23
Payer: MEDICARE

## 2023-07-20 PROCEDURE — 97803 MED NUTRITION INDIV SUBSEQ: CPT | Performed by: DIETITIAN, REGISTERED

## 2023-07-20 NOTE — PROGRESS NOTES
to :    [x]  Next appt: 3 weeks     Dietitian: Rene Velazco MS, RD, CSSD    Date: 7/20/2023 Time: 1:46 PM

## 2023-08-10 ENCOUNTER — HOSPITAL ENCOUNTER (OUTPATIENT)
Facility: HOSPITAL | Age: 58
Setting detail: RECURRING SERIES
Discharge: HOME | End: 2023-08-13
Payer: MEDICARE

## 2023-08-10 PROCEDURE — 97803 MED NUTRITION INDIV SUBSEQ: CPT | Performed by: DIETITIAN, REGISTERED

## 2023-08-10 NOTE — PROGRESS NOTES
NUTRITION - FOLLOW-UP TREATMENT NOTE  Patient Name: Jodie Sorenson         Date: 8/10/2023  : 1965    YES Patient  Verified  Diagnosis: G64.82,W33.45 (ICD-10-CM) - Morbid obesity with BMI of 45.0-49.9, adult ([address])   In time:   2:20pm             Out time:   3:20ppm   Total Treatment Time (min):   60     SUBJECTIVE/ASSESSMENT  Current Wt: 240.6 Previous Wt: 241. 8 Wt Change: -1.2     Initial Wt: 241.2 Total Wt change: -0.6 Height: 61     Changes in medication or medical history? Any new allergies, surgeries or procedures? No    If yes, update Summary List   Inconsistent with using her fluid pills. Has not taken them today or yesterday. She will take her fluid medication tomorrow. Nutrition Diagnosis        Diagnosis Status:   Excessive energy intake R/T Food and nutrition related knowledge deficit for basics of weight loss, and lack of social support AEB pt request for counseling,  report of not understanding her weight gain and how to loose, and intake of 3L of wine per week for assistance sleeping and loneliness/boredom at home alone all day without reliable transportation. [x]  Improved []  No Change    []  Declined   []  Discontinued        Nutrition Monitoring and Evaluation: Swelling in her legs notable. Unable to wear compression socks due to them cutting off circulation at the top. Not consistent with taking fluid pills due to not wanting to be tied to the bathroom or having to watch grand kids. Fears that pads will not hold. Some sweet tea< 1 bottle every other day. No salt used in cooking. Sodium from processed or pre-seasoned meats when using. Reports small portions and more consistent with eating protein and vegetables during the day.     1-1.5 glasses of wine per night to help her sleep. Fears of if using melatonin she will sleep through needing to urinate at night. Pt reports \"trying to work on it. \"    Reports not over eating.  Using spray  Now using salt free

## 2023-08-21 ENCOUNTER — TELEPHONE (OUTPATIENT)
Age: 58
End: 2023-08-21

## 2023-08-21 RX ORDER — RIVAROXABAN 20 MG/1
TABLET, FILM COATED ORAL
Qty: 90 TABLET | Refills: 0 | OUTPATIENT
Start: 2023-08-21

## 2023-08-21 RX ORDER — SPIRONOLACTONE 25 MG/1
TABLET ORAL
Qty: 90 TABLET | Refills: 0 | OUTPATIENT
Start: 2023-08-21

## 2023-08-22 NOTE — TELEPHONE ENCOUNTER
LVM for patient to call back get answer to her questions and to potentially schedule an apt with Dr. Nette Suggs

## 2023-10-05 ENCOUNTER — HOSPITAL ENCOUNTER (OUTPATIENT)
Facility: HOSPITAL | Age: 58
Setting detail: RECURRING SERIES
Discharge: HOME | End: 2023-10-08
Payer: MEDICARE

## 2023-10-05 PROCEDURE — 97803 MED NUTRITION INDIV SUBSEQ: CPT | Performed by: DIETITIAN, REGISTERED

## 2023-10-05 NOTE — PROGRESS NOTES
meals -return to vegetable intake daily  Increased to 3k steps on cubi working to [employer]. Notes sweating at end sometimes. -increase intensity of exercise. Goal to end tired from exercises. -chair exercise at least 1 time per day. Not met. -keep food log (include calories and sodium) 3 days per week. Not taking since surgery. Take fluid pill today and this weekend. Take each Tuesday and Thursday at minimum      Nutrition Prescription and  Intervention Motivational interviewing  Stages of changes: preparation-action (for some things). BLINDED WEIGHTS REQUESTED    Reviewed recommendations around taking fluid pills consistently, reducing alcohol intake, and increasing exercise. Reviewed recommendations for reducing alcohol intake. Provided support for changes made. Patient Education:  [x]  Review current plan with patient   []  Other:    Handouts/  Information Provided: []  Carbohydrates  []  Protein  []  Fiber  []  Serving Sizes  []  Fluids  []  General guidelines []  Diabetes  []  Cholesterol  []  Sodium  []  SBGM  []  Food Journals  []  Others:      Patient Goals -limit to 1 mini-bottle (glass) of wine per day. Working to reduce further.   -continue food changes around portions and vegetable intake daily  -increase intensity of exercise.  4k steps on cubi.   -take fluid pill today/tomorrow        PLAN  [x]  Continue on current plan []  Follow-up PRN   []  Discharge due to :    [x]  Next appt: 3-4 weeks     Dietitian: Selwyn Hernandez MS, RD, CSSD    Date: 10/5/2023 Time: 11:04 AM

## 2023-11-02 ENCOUNTER — HOSPITAL ENCOUNTER (OUTPATIENT)
Facility: HOSPITAL | Age: 58
Setting detail: RECURRING SERIES
Discharge: HOME | End: 2023-11-05
Payer: MEDICARE

## 2023-11-02 PROCEDURE — 97803 MED NUTRITION INDIV SUBSEQ: CPT | Performed by: DIETITIAN, REGISTERED

## 2023-11-02 NOTE — PROGRESS NOTES
NUTRITION - FOLLOW-UP TREATMENT NOTE  Patient Name: Kirsten Arriaga         Date: 2023  : 1965    YES Patient  Verified  Diagnosis: N55.69,B42.31 (ICD-10-CM) - Morbid obesity with BMI of 45.0-49.9, adult ([address])   In time:   11:40am            Out time:  12:15pm   Total Treatment Time (min):  35     SUBJECTIVE/ASSESSMENT  Current Wt: 228.6 Previous Wt: 230.0 Wt Change: -1.2     Initial Wt: 241.2 Total Wt change: -12.4 Height: 61     Changes in medication or medical history? Any new allergies, surgeries or procedures?    no    If yes, update Summary List   taking her fluid pills every other day. Taking 1 time per day. Vein procedures to improve profusion. . BP Readings from Last 3 Encounters:   23 122/87   23 (!) 142/90   22 131/76          Nutrition Diagnosis        Diagnosis Status:   Excessive energy intake R/T Food and nutrition related knowledge deficit for basics of weight loss, and lack of social support AEB pt request for counseling,  report of not understanding her weight gain and how to loose, and intake of 3L of wine per week for assistance sleeping and loneliness/boredom at home alone all day without reliable transportation. [x]  Improved []  No Change    []  Declined   []  Discontinued        Nutrition Monitoring and Evaluation: Some pain when starting her Cubbi. Using it when at home. Adding in steps if unable to use it. Notes when taking fluid pill sometimes she has to stay in bed all day as otherwise she will see fluid just accumulating in her legs. Notes less fluid accumulation when not taking fluid pill. Using melatonin. Less issues with fear of sleeping through need to use the bathroom with taking fluid pill more often. Usually also using alcohol for aid with sleep. Previous goals  Few days without alcohol. -limit to 1 mini-bottle (glass) of wine per day. Working to reduce further.    Met. -continue food changes around portions and

## 2023-12-07 ENCOUNTER — HOSPITAL ENCOUNTER (OUTPATIENT)
Facility: HOSPITAL | Age: 58
Setting detail: RECURRING SERIES
Discharge: HOME | End: 2023-12-10
Payer: MEDICARE

## 2023-12-07 PROCEDURE — 97803 MED NUTRITION INDIV SUBSEQ: CPT | Performed by: DIETITIAN, REGISTERED

## 2023-12-07 NOTE — PROGRESS NOTES
NUTRITION - FOLLOW-UP TREATMENT NOTE  Patient Name: Lesa Conley         Date: 2023  : 1965    YES Patient  Verified  Diagnosis: T06.34,E85.30 (ICD-10-CM) - Morbid obesity with BMI of 45.0-49.9, adult ([address])   In time:   11:40am            Out time:  12:25pm   Total Treatment Time (min):  45     SUBJECTIVE/ASSESSMENT  Current Wt: 230 Previous Wt: 228.6 Wt Change: +2     Initial Wt: 241.2 Total Wt change: -11.2 Height: 61     Changes in medication or medical history? Any new allergies, surgeries or procedures?    no    If yes, update Summary List   taking her fluid pills inconsistently. Sometimes a full pill. Sometimes a half pill. Noted knee swelling and pain when not taking any and has now started to take it more regularly this week. Vein procedures to improve profusion. . BP Readings from Last 3 Encounters:   23 122/87   23 (!) 142/90   22 131/76          Nutrition Diagnosis        Diagnosis Status: New: physical inactivity R/T low motivation and depressive mood AEB pt reports feeling down due to time of year and lost family, low interest in being active, not taking fluid pill leading to stiff legs/knees and not wanting to use exercise bike. Excessive energy intake R/T Food and nutrition related knowledge deficit for basics of weight loss, and lack of social support AEB pt request for counseling,  report of not understanding her weight gain and how to loose, and intake of 3L of wine per week for assistance sleeping and loneliness/boredom at home alone all day without reliable transportation. [x]  Improved []  No Change    []  Declined   []  Discontinued        Nutrition Monitoring and Evaluation: More aware of how important taking her fluid pill is. Knees hurt when not taking fluid pill. Didn't feel like using Cubii when knees hurt. Not drinking daily. But 1.5 bottles per night when having it. Using to relax her. Done with therapy.  Set number of

## 2024-08-19 NOTE — TELEPHONE ENCOUNTER
Patient has a question about the pain she is having during short walks that gives her a buckling sensation in her knees, she would like to know if gaining or loosing weight could contribute to this sensation.  She may be reached at [phone] no known allergies

## 2024-11-30 ENCOUNTER — APPOINTMENT (OUTPATIENT)
Facility: HOSPITAL | Age: 59
End: 2024-11-30
Payer: MEDICARE

## 2024-11-30 ENCOUNTER — HOSPITAL ENCOUNTER (EMERGENCY)
Facility: HOSPITAL | Age: 59
Discharge: HOME | End: 2024-11-30
Payer: MEDICARE

## 2024-11-30 VITALS
HEART RATE: 80 BPM | SYSTOLIC BLOOD PRESSURE: 164 MMHG | WEIGHT: 232 LBS | BODY MASS INDEX: 45.55 KG/M2 | TEMPERATURE: 98.4 F | OXYGEN SATURATION: 97 % | HEIGHT: 60 IN | DIASTOLIC BLOOD PRESSURE: 104 MMHG

## 2024-11-30 DIAGNOSIS — J32.9 RHINOSINUSITIS: Primary | ICD-10-CM

## 2024-11-30 LAB
FLUAV RNA SPEC QL NAA+PROBE: NOT DETECTED
FLUBV RNA SPEC QL NAA+PROBE: NOT DETECTED
SARS-COV-2 RNA RESP QL NAA+PROBE: NOT DETECTED
SOURCE: NORMAL

## 2024-11-30 PROCEDURE — 71046 X-RAY EXAM CHEST 2 VIEWS: CPT

## 2024-11-30 PROCEDURE — 87636 SARSCOV2 & INF A&B AMP PRB: CPT

## 2024-11-30 PROCEDURE — 99284 EMERGENCY DEPT VISIT MOD MDM: CPT

## 2024-11-30 RX ORDER — GUAIFENESIN AND DEXTROMETHORPHAN HYDROBROMIDE 600; 30 MG/1; MG/1
1 TABLET, EXTENDED RELEASE ORAL EVERY 12 HOURS PRN
Qty: 20 TABLET | Refills: 0 | Status: SHIPPED | OUTPATIENT
Start: 2024-11-30 | End: 2024-12-10

## 2024-11-30 RX ORDER — DOXYCYCLINE HYCLATE 100 MG
100 TABLET ORAL 2 TIMES DAILY
Qty: 14 TABLET | Refills: 0 | Status: SHIPPED | OUTPATIENT
Start: 2024-11-30 | End: 2024-12-07

## 2024-11-30 ASSESSMENT — PAIN SCALES - GENERAL: PAINLEVEL_OUTOF10: 0

## 2024-12-02 NOTE — ED PROVIDER NOTES
\[facility]\"" EMERGENCY DEPT  EMERGENCY DEPARTMENT ENCOUNTER         Pt Name: Camilla Santiago  MRN: [id number]  Birthdate 1965  Date of evaluation: 11/30/2024  Provider: Sera Romero PA-C   PCP: Aaliyah Estrada MD  Note Started: 2:58 AM EST 12/2/24     CHIEF COMPLAINT       Chief Complaint   Patient presents with    Cough     Pt reports productive cough with \"a lot of mucous\" x 3 weeks.         HISTORY OF PRESENT ILLNESS: 1 or more elements      History From: Patient  HPI Limitations: None     Camilla Santiago is a 59 y.o. female who presents to the emergency department for evaluation of 3 days of cough, patient additionally reports that she has had \"a lot of mucus\" recently.  Denies any chest pains or shortness of breath at this time.     Nursing Notes were all reviewed and agreed with or any disagreements were addressed in the HPI.  Please see MDM for additional details of HPI and ROS     REVIEW OF SYSTEMS      Review of Systems     Positives and Pertinent negatives as per HPI.    PAST HISTORY     Past Medical History:  Past Medical History:   Diagnosis Date    Asthma     COVID-19 08/2020    Still has impaired smell (6/2022)    DVT (deep venous thrombosis) ([facility])     Environmental allergies     Hypertension     Lymphedema of both lower extremities 12/31/2019    History of secondary cellulitis as well as DVT with PTE    OA (osteoarthritis) of knee     Bilaterally, severe, using walker    Pulmonary embolism ([facility])        Past Surgical History:  Past Surgical History:   Procedure Laterality Date    BREAST BIOPSY Bilateral     2004 stereo bx?     COLONOSCOPY N/A 7/30/2020    COLONOSCOPY performed by Eugenio Mancilla MD at \[facility]\"" ENDOSCOPY    ORTHOPEDIC SURGERY      right hand tendon repair and screw in wrist and took piece of bone from hip f       Family History:  Family History   Problem Relation Age of Onset    Other Sister         aneurysm    No Known Problems Son     Cancer Mother         lung    Other Father

## 2025-03-30 ENCOUNTER — APPOINTMENT (OUTPATIENT)
Facility: HOSPITAL | Age: 60
End: 2025-03-30
Payer: MEDICARE

## 2025-03-30 ENCOUNTER — HOSPITAL ENCOUNTER (EMERGENCY)
Facility: HOSPITAL | Age: 60
Discharge: HOME | End: 2025-03-30
Attending: STUDENT IN AN ORGANIZED HEALTH CARE EDUCATION/TRAINING PROGRAM
Payer: MEDICARE

## 2025-03-30 VITALS
DIASTOLIC BLOOD PRESSURE: 95 MMHG | TEMPERATURE: 98.1 F | HEART RATE: 98 BPM | RESPIRATION RATE: 16 BRPM | SYSTOLIC BLOOD PRESSURE: 127 MMHG | OXYGEN SATURATION: 95 %

## 2025-03-30 DIAGNOSIS — S89.91XA INJURY OF RIGHT KNEE, INITIAL ENCOUNTER: ICD-10-CM

## 2025-03-30 DIAGNOSIS — M25.461 EFFUSION OF RIGHT KNEE: Primary | ICD-10-CM

## 2025-03-30 DIAGNOSIS — M17.11 OSTEOARTHRITIS OF RIGHT KNEE, UNSPECIFIED OSTEOARTHRITIS TYPE: ICD-10-CM

## 2025-03-30 PROCEDURE — 73562 X-RAY EXAM OF KNEE 3: CPT

## 2025-03-30 PROCEDURE — 99283 EMERGENCY DEPT VISIT LOW MDM: CPT

## 2025-03-30 PROCEDURE — 6370000000 HC RX 637 (ALT 250 FOR IP): Performed by: STUDENT IN AN ORGANIZED HEALTH CARE EDUCATION/TRAINING PROGRAM

## 2025-03-30 RX ORDER — OXYCODONE HYDROCHLORIDE 5 MG/1
5 TABLET ORAL EVERY 6 HOURS PRN
Qty: 6 TABLET | Refills: 0 | Status: SHIPPED | OUTPATIENT
Start: 2025-03-30 | End: 2025-04-02

## 2025-03-30 RX ORDER — OXYCODONE HYDROCHLORIDE 5 MG/1
5 TABLET ORAL
Refills: 0 | Status: COMPLETED | OUTPATIENT
Start: 2025-03-30 | End: 2025-03-30

## 2025-03-30 RX ADMIN — OXYCODONE HYDROCHLORIDE 5 MG: 5 TABLET ORAL at 18:19

## 2025-03-30 ASSESSMENT — PAIN SCALES - GENERAL: PAINLEVEL_OUTOF10: 10

## 2025-03-30 NOTE — DISCHARGE INSTRUCTIONS
Thank You!    It was a pleasure taking care of you in our Emergency Department today. We know that when you come to our Emergency Department, you are entrusting us with your health, comfort, and safety. Our physicians and nurses honor that trust, and truly appreciate the opportunity to care for you and your loved ones.      We also value your feedback. If you receive a survey about your Emergency Department experience today, please fill it out.  We care about our patients' feedback, and we listen to what you have to say.  Thank you.    Tristan Lindsay MD  ________________________________________________________________________  I have included a copy of your lab results and/or radiologic studies from today's visit so you can have them easily available at your follow-up visit. We hope you feel better and please do not hesitate to contact the ED if you have any questions at all!    No results found for this or any previous visit (from the past 12 hours).  XR KNEE RIGHT (3 VIEWS)   Final Result   Severe osteoarthritis with a small joint effusion and loose bodies..      Electronically signed by QUYEN CAPPS          The exam and treatment you received in the Emergency Department were for an urgent problem and are not intended as complete care. It is important that you follow up with a doctor, nurse practitioner, or physician assistant for ongoing care. If your symptoms become worse or you do not improve as expected and you are unable to reach your usual health care provider, you should return to the Emergency Department. We are available 24 hours a day.    Please take your discharge instructions with you when you go to your follow-up appointment.     If a prescription has been provided, please have it filled as soon as possible to prevent a delay in treatment. Read the entire medication instruction sheet provided to you by the pharmacy. If you have any questions or reservations about taking the medication due to side

## 2025-04-01 ENCOUNTER — TELEPHONE (OUTPATIENT)
Age: 60
End: 2025-04-01

## 2025-04-01 NOTE — TELEPHONE ENCOUNTER
Patient called wanting to know if it would be better to wait to come in to be seen for her Rt knee or wait. Patient was last seen in the office on 3/17/23 at 240 lbs and is now at 228. Patient wants to know if she needs to wait to get down to 200 lbs before being seen again. Patient can be reached at [phone]. Patient was last seen in the ED on 3/30 for her Rt knee and is having pain.

## 2025-04-01 NOTE — TELEPHONE ENCOUNTER
Identified pt with two pt identifiers (name and ). Reviewed chart in preparation for visit and have obtained necessary documentation.  Spoke with patient she stated during her ER visit she was told she has fluid on her right knee and patient would like have it aspirated in hopes to relieve some of the pain she has been experiencing. Appt scheduled for 25 @220pm

## 2025-04-07 ENCOUNTER — OFFICE VISIT (OUTPATIENT)
Age: 60
End: 2025-04-07
Payer: MEDICARE

## 2025-04-07 DIAGNOSIS — M17.11 UNILATERAL PRIMARY OSTEOARTHRITIS, RIGHT KNEE: Primary | ICD-10-CM

## 2025-04-07 PROCEDURE — 3017F COLORECTAL CA SCREEN DOC REV: CPT | Performed by: ORTHOPAEDIC SURGERY

## 2025-04-07 PROCEDURE — G8419 CALC BMI OUT NRM PARAM NOF/U: HCPCS | Performed by: ORTHOPAEDIC SURGERY

## 2025-04-07 PROCEDURE — G8427 DOCREV CUR MEDS BY ELIG CLIN: HCPCS | Performed by: ORTHOPAEDIC SURGERY

## 2025-04-07 PROCEDURE — 4004F PT TOBACCO SCREEN RCVD TLK: CPT | Performed by: ORTHOPAEDIC SURGERY

## 2025-04-07 PROCEDURE — 99213 OFFICE O/P EST LOW 20 MIN: CPT | Performed by: ORTHOPAEDIC SURGERY

## 2025-04-07 RX ORDER — DULOXETIN HYDROCHLORIDE 30 MG/1
30 CAPSULE, DELAYED RELEASE ORAL 2 TIMES DAILY
COMMUNITY
Start: 2025-02-28

## 2025-04-07 ASSESSMENT — PATIENT HEALTH QUESTIONNAIRE - PHQ9
SUM OF ALL RESPONSES TO PHQ QUESTIONS 1-9: 0
1. LITTLE INTEREST OR PLEASURE IN DOING THINGS: NOT AT ALL
SUM OF ALL RESPONSES TO PHQ QUESTIONS 1-9: 0
2. FEELING DOWN, DEPRESSED OR HOPELESS: NOT AT ALL

## 2025-04-07 NOTE — PROGRESS NOTES
Identified pt with two pt identifiers (name and ). Reviewed chart in preparation for visit and have obtained necessary documentation.    Camilla Santiago is a 59 y.o. female Follow-up ( F/U requesting possible aspiration)  .    There were no vitals filed for this visit.       1. Have you been to the ER, urgent care clinic since your last visit?  Hospitalized since your last visit?  no     2. Have you seen or consulted any other health care providers outside of the [facility] since your last visit?  Include any pap smears or colon screening.  no

## 2025-04-07 NOTE — PROGRESS NOTES
4/7/2025      CC: right knee pain    HPI:      This is a 59 y.o. year old female who presents for a follow up visit.  The patient was last seen and diagnosed with right knee osteoarthritis.   The patient's treatments since the most recent visit have comprised of weight loss efforts, percocet, injections.   The patient has had no relief of the chief complaint.        PMH:  Past Medical History:   Diagnosis Date    Asthma     COVID-19 08/2020    Still has impaired smell (6/2022)    DVT (deep venous thrombosis) ([facility])     Environmental allergies     Hypertension     Lymphedema of both lower extremities 12/31/2019    History of secondary cellulitis as well as DVT with PTE    OA (osteoarthritis) of knee     Bilaterally, severe, using walker    Pulmonary embolism ([facility])        PSxHx:  Past Surgical History:   Procedure Laterality Date    BREAST BIOPSY Bilateral     2004 stereo bx?     COLONOSCOPY N/A 7/30/2020    COLONOSCOPY performed by Eugenio Mancilla MD at [facility] ENDOSCOPY    ORTHOPEDIC SURGERY      right hand tendon repair and screw in wrist and took piece of bone from hip f       Meds:    Current Outpatient Medications:     DULoxetine (CYMBALTA) 30 MG extended release capsule, Take 1 capsule by mouth 2 times daily, Disp: , Rfl:     acetaminophen (TYLENOL) 650 MG extended release tablet, Take 650 mg by mouth as needed, Disp: , Rfl:     albuterol sulfate HFA (PROVENTIL;VENTOLIN;PROAIR) 108 (90 Base) MCG/ACT inhaler, Inhale 1 puff into the lungs every 6 hours as needed, Disp: , Rfl:     budesonide-formoterol (SYMBICORT) 80-4.5 MCG/ACT AERO, Inhale 2 puffs into the lungs 2 times daily as needed, Disp: , Rfl:     clotrimazole (LOTRIMIN) 1 % cream, Apply topically 2 times daily as needed, Disp: , Rfl:     diclofenac sodium (VOLTAREN) 1 % GEL, Apply 4 g topically 4 times daily, Disp: , Rfl:     furosemide (LASIX) 20 MG tablet, Take 20 mg by mouth as needed, Disp: , Rfl:     metoprolol succinate (TOPROL XL) 50 MG extended

## 2025-04-09 NOTE — ED PROVIDER NOTES
[facility] EMERGENCY DEPARTMENT  EMERGENCY DEPARTMENT ENCOUNTER       Pt Name: Camilla Santiago  MRN: [id number]  Birthdate 1965  Date of evaluation: 3/30/2025  Provider: Tristan Lindsay MD   PCP: Aaliyah Estrada MD  Note Started: 11:00 PM 4/8/25     CHIEF COMPLAINT       Chief Complaint   Patient presents with    Knee Pain     Pt ambulatory to triage with use of a rollator for c/o R knee pain that has been ongoing for a week. Pt had a fall 1 week ago. Pain in knee has been worsening since that fall. Pt reports standing up and down, and moving around her house is difficult.         HISTORY OF PRESENT ILLNESS: 1 or more elements      History From: Patient  None     Camilla Santiago is a 59 y.o. female who presents to the ED with right knee pain and swelling. Patient reports a fall from standing about one week ago, she states knee pain has been gradually worsening since that time. She has still been ambulatory with rollator.      Nursing Notes were all reviewed and agreed with or any disagreements were addressed in the HPI.     REVIEW OF SYSTEMS      Review of Systems     Positives and Pertinent negatives as per HPI.    PAST HISTORY     Past Medical History:  Past Medical History:   Diagnosis Date    Asthma     COVID-19 08/2020    Still has impaired smell (6/2022)    DVT (deep venous thrombosis) (HCC)     Environmental allergies     Hypertension     Lymphedema of both lower extremities 12/31/2019    History of secondary cellulitis as well as DVT with PTE    OA (osteoarthritis) of knee     Bilaterally, severe, using walker    Pulmonary embolism (HCC)          Past Surgical History:  Past Surgical History:   Procedure Laterality Date    BREAST BIOPSY Bilateral     2004 stereo bx?     COLONOSCOPY N/A 7/30/2020    COLONOSCOPY performed by Eugenio Mancilla MD at [facility] ENDOSCOPY    ORTHOPEDIC SURGERY      right hand tendon repair and screw in wrist and took piece of bone from hip f       Family

## 2025-05-29 ENCOUNTER — PREP FOR PROCEDURE (OUTPATIENT)
Age: 60
End: 2025-05-29

## 2025-05-29 ENCOUNTER — TELEPHONE (OUTPATIENT)
Age: 60
End: 2025-05-29

## 2025-05-29 ENCOUNTER — OFFICE VISIT (OUTPATIENT)
Age: 60
End: 2025-05-29
Payer: MEDICARE

## 2025-05-29 DIAGNOSIS — M17.11 UNILATERAL PRIMARY OSTEOARTHRITIS, RIGHT KNEE: Primary | ICD-10-CM

## 2025-05-29 DIAGNOSIS — Z96.651 S/P TOTAL KNEE ARTHROPLASTY, RIGHT: Primary | ICD-10-CM

## 2025-05-29 DIAGNOSIS — M17.11 UNILATERAL PRIMARY OSTEOARTHRITIS, RIGHT KNEE: ICD-10-CM

## 2025-05-29 PROCEDURE — 99213 OFFICE O/P EST LOW 20 MIN: CPT | Performed by: ORTHOPAEDIC SURGERY

## 2025-05-29 PROCEDURE — G8417 CALC BMI ABV UP PARAM F/U: HCPCS | Performed by: ORTHOPAEDIC SURGERY

## 2025-05-29 PROCEDURE — 4004F PT TOBACCO SCREEN RCVD TLK: CPT | Performed by: ORTHOPAEDIC SURGERY

## 2025-05-29 PROCEDURE — G8427 DOCREV CUR MEDS BY ELIG CLIN: HCPCS | Performed by: ORTHOPAEDIC SURGERY

## 2025-05-29 PROCEDURE — 3017F COLORECTAL CA SCREEN DOC REV: CPT | Performed by: ORTHOPAEDIC SURGERY

## 2025-05-29 ASSESSMENT — PATIENT HEALTH QUESTIONNAIRE - PHQ9
SUM OF ALL RESPONSES TO PHQ QUESTIONS 1-9: 0
SUM OF ALL RESPONSES TO PHQ QUESTIONS 1-9: 0
2. FEELING DOWN, DEPRESSED OR HOPELESS: NOT AT ALL
SUM OF ALL RESPONSES TO PHQ QUESTIONS 1-9: 0
SUM OF ALL RESPONSES TO PHQ QUESTIONS 1-9: 0
1. LITTLE INTEREST OR PLEASURE IN DOING THINGS: NOT AT ALL

## 2025-05-29 NOTE — PROGRESS NOTES
5/29/2025      CC: right knee pain    HPI:      This is a 59 y.o. year old female who presents for a follow up visit.  The patient was last seen and diagnosed with right knee osteoarthritis.   The patient's treatments since the most recent visit have comprised of injections, therapy.   The patient has had no relief of the chief complaint.        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     COLONOSCOPY N/A 7/30/2020    COLONOSCOPY performed by Eugenio Mancilla MD at [facility] ENDOSCOPY    ORTHOPEDIC SURGERY      right hand tendon repair and screw in wrist and took piece of bone from hip f       Meds:    Current Outpatient Medications:     DULoxetine (CYMBALTA) 30 MG extended release capsule, Take 1 capsule by mouth 2 times daily, Disp: , Rfl:     acetaminophen (TYLENOL) 650 MG extended release tablet, Take 650 mg by mouth as needed, Disp: , Rfl:     albuterol sulfate HFA (PROVENTIL;VENTOLIN;PROAIR) 108 (90 Base) MCG/ACT inhaler, Inhale 1 puff into the lungs every 6 hours as needed, Disp: , Rfl:     budesonide-formoterol (SYMBICORT) 80-4.5 MCG/ACT AERO, Inhale 2 puffs into the lungs 2 times daily as needed, Disp: , Rfl:     clotrimazole (LOTRIMIN) 1 % cream, Apply topically 2 times daily as needed, Disp: , Rfl:     diclofenac sodium (VOLTAREN) 1 % GEL, Apply 4 g topically 4 times daily, Disp: , Rfl:     furosemide (LASIX) 20 MG tablet, Take 20 mg by mouth as needed, Disp: , Rfl:     metoprolol succinate (TOPROL XL) 50 MG extended release tablet, Take 1

## 2025-05-29 NOTE — PROGRESS NOTES
Identified pt with two pt identifiers (name and ). Reviewed chart in preparation for visit and have obtained necessary documentation.    Camilla Santiago is a 59 y.o. female Follow-up (6 wk FU // CRISTINA Knee pain)  .    There were no vitals filed for this visit.       1. Have you been to the ER, urgent care clinic since your last visit?  Hospitalized since your last visit?  no     2. Have you seen or consulted any other health care providers outside of the [facility] since your last visit?  Include any pap smears or colon screening.  no

## 2025-05-29 NOTE — TELEPHONE ENCOUNTER
Contacted patient to schedule RIGHT TOTAL KNEE ARTHROPLASTY. Scheduled for 7/1/25.     Fast Track: YES    Patient will be discharged home and will have FAMILY there to assist with post op recovery.    Advised patient they would need to schedule pre op appointments with IN-PERSON PAT, PCP,CARDIOLOGY  and would need to bring clearance form and office note to their Pre Admission Testing appointment.    Made patient aware that any cancellations would need to be made at least 2 weeks prior to scheduled surgery date.     Patient would like to [facility] . Clinical team to place order and schedule appt.           ----- Message from Dr. Claus Barnhart, DO sent at 5/29/2025 10:58 AM EDT -----  Diagnosis: Unilateral Primary Osteoarthritis, right knee M17.11  Procedure: Right total knee arthroplasty   CPT: 14569  Operative minutes: 100  23 hr obs  Location: [facility]  PAT: Yes  Class: Yes  Special Equipment: Regular table, [place] foot quiroz, [place] Triathlon, Plan for cemented TKA, foot quiroz for prepping  Staffing: Retractor quiroz  Anesthesia: spinal with adductor canal block  Surgical index: 1      Fastrack    Tuesday

## 2025-06-11 ENCOUNTER — HOSPITAL ENCOUNTER (OUTPATIENT)
Facility: HOSPITAL | Age: 60
Discharge: HOME | End: 2025-06-14
Payer: MEDICARE

## 2025-06-11 VITALS
BODY MASS INDEX: 44.92 KG/M2 | OXYGEN SATURATION: 98 % | RESPIRATION RATE: 16 BRPM | DIASTOLIC BLOOD PRESSURE: 82 MMHG | TEMPERATURE: 97.9 F | SYSTOLIC BLOOD PRESSURE: 137 MMHG | HEIGHT: 60 IN | HEART RATE: 71 BPM | WEIGHT: 228.8 LBS

## 2025-06-11 LAB
ALBUMIN SERPL-MCNC: 3.9 G/DL (ref 3.5–5)
ALBUMIN/GLOB SERPL: 1 (ref 1.1–2.2)
ALP SERPL-CCNC: 66 U/L (ref 45–117)
ALT SERPL-CCNC: 18 U/L (ref 12–78)
ANION GAP SERPL CALC-SCNC: 5 MMOL/L (ref 2–12)
APPEARANCE UR: CLEAR
AST SERPL-CCNC: 16 U/L (ref 15–37)
BACTERIA URNS QL MICRO: NEGATIVE /HPF
BASOPHILS # BLD: 0.02 K/UL (ref 0–0.1)
BASOPHILS NFR BLD: 0.3 % (ref 0–1)
BILIRUB SERPL-MCNC: 1.6 MG/DL (ref 0.2–1)
BILIRUB UR QL: NEGATIVE
BUN SERPL-MCNC: 11 MG/DL (ref 6–20)
BUN/CREAT SERPL: 11 (ref 12–20)
CALCIUM SERPL-MCNC: 9.2 MG/DL (ref 8.5–10.1)
CHLORIDE SERPL-SCNC: 101 MMOL/L (ref 97–108)
CO2 SERPL-SCNC: 30 MMOL/L (ref 21–32)
COLOR UR: NORMAL
CREAT SERPL-MCNC: 0.99 MG/DL (ref 0.55–1.02)
DIFFERENTIAL METHOD BLD: NORMAL
EKG ATRIAL RATE: 67 BPM
EKG DIAGNOSIS: NORMAL
EKG P AXIS: 55 DEGREES
EKG P-R INTERVAL: 146 MS
EKG Q-T INTERVAL: 420 MS
EKG QRS DURATION: 90 MS
EKG QTC CALCULATION (BAZETT): 443 MS
EKG R AXIS: 5 DEGREES
EKG T AXIS: 34 DEGREES
EKG VENTRICULAR RATE: 67 BPM
EOSINOPHIL # BLD: 0.09 K/UL (ref 0–0.4)
EOSINOPHIL NFR BLD: 1.2 % (ref 0–7)
EPITH CASTS URNS QL MICRO: NORMAL /LPF
ERYTHROCYTE [DISTWIDTH] IN BLOOD BY AUTOMATED COUNT: 13.4 % (ref 11.5–14.5)
EST. AVERAGE GLUCOSE BLD GHB EST-MCNC: 91 MG/DL
GLOBULIN SER CALC-MCNC: 4 G/DL (ref 2–4)
GLUCOSE SERPL-MCNC: 101 MG/DL (ref 65–100)
GLUCOSE UR STRIP.AUTO-MCNC: NEGATIVE MG/DL
HBA1C MFR BLD: 4.8 % (ref 4–5.6)
HCT VFR BLD AUTO: 37.9 % (ref 35–47)
HGB BLD-MCNC: 12.8 G/DL (ref 11.5–16)
HGB UR QL STRIP: NEGATIVE
HYALINE CASTS URNS QL MICRO: NORMAL /LPF (ref 0–2)
IMM GRANULOCYTES # BLD AUTO: 0.02 K/UL (ref 0–0.04)
IMM GRANULOCYTES NFR BLD AUTO: 0.3 % (ref 0–0.5)
KETONES UR QL STRIP.AUTO: NEGATIVE MG/DL
LEUKOCYTE ESTERASE UR QL STRIP.AUTO: NEGATIVE
LYMPHOCYTES # BLD: 1.78 K/UL (ref 0.8–3.5)
LYMPHOCYTES NFR BLD: 24.7 % (ref 12–49)
MCH RBC QN AUTO: 30.4 PG (ref 26–34)
MCHC RBC AUTO-ENTMCNC: 33.8 G/DL (ref 30–36.5)
MCV RBC AUTO: 90 FL (ref 80–99)
MONOCYTES # BLD: 0.46 K/UL (ref 0–1)
MONOCYTES NFR BLD: 6.4 % (ref 5–13)
NEUTS SEG # BLD: 4.84 K/UL (ref 1.8–8)
NEUTS SEG NFR BLD: 67.1 % (ref 32–75)
NITRITE UR QL STRIP.AUTO: NEGATIVE
NRBC # BLD: 0 K/UL (ref 0–0.01)
NRBC BLD-RTO: 0 PER 100 WBC
PH UR STRIP: 7 (ref 5–8)
PLATELET # BLD AUTO: 246 K/UL (ref 150–400)
PMV BLD AUTO: 9.4 FL (ref 8.9–12.9)
POTASSIUM SERPL-SCNC: 4.2 MMOL/L (ref 3.5–5.1)
PROT SERPL-MCNC: 7.9 G/DL (ref 6.4–8.2)
PROT UR STRIP-MCNC: NEGATIVE MG/DL
RBC # BLD AUTO: 4.21 M/UL (ref 3.8–5.2)
RBC #/AREA URNS HPF: NORMAL /HPF (ref 0–5)
SODIUM SERPL-SCNC: 136 MMOL/L (ref 136–145)
SP GR UR REFRACTOMETRY: 1.01 (ref 1–1.03)
URINE CULTURE IF INDICATED: NORMAL
UROBILINOGEN UR QL STRIP.AUTO: 0.2 EU/DL (ref 0.2–1)
WBC # BLD AUTO: 7.2 K/UL (ref 3.6–11)
WBC URNS QL MICRO: NORMAL /HPF (ref 0–4)

## 2025-06-11 PROCEDURE — 81001 URINALYSIS AUTO W/SCOPE: CPT

## 2025-06-11 PROCEDURE — 85025 COMPLETE CBC W/AUTO DIFF WBC: CPT

## 2025-06-11 PROCEDURE — 93010 ELECTROCARDIOGRAM REPORT: CPT | Performed by: SPECIALIST

## 2025-06-11 PROCEDURE — 36415 COLL VENOUS BLD VENIPUNCTURE: CPT

## 2025-06-11 PROCEDURE — 93005 ELECTROCARDIOGRAM TRACING: CPT | Performed by: ORTHOPAEDIC SURGERY

## 2025-06-11 PROCEDURE — 80053 COMPREHEN METABOLIC PANEL: CPT

## 2025-06-11 PROCEDURE — 83036 HEMOGLOBIN GLYCOSYLATED A1C: CPT

## 2025-06-11 RX ORDER — ERGOCALCIFEROL 1.25 MG/1
50000 CAPSULE, LIQUID FILLED ORAL WEEKLY
COMMUNITY

## 2025-06-11 ASSESSMENT — ENCOUNTER SYMPTOMS
WHEEZING: 0
STRIDOR: 0
CHOKING: 0
CONSTIPATION: 0
NAUSEA: 0
RHINORRHEA: 0
SORE THROAT: 0
VOICE CHANGE: 0
TROUBLE SWALLOWING: 0
CHEST TIGHTNESS: 0
COUGH: 0
APNEA: 0
SINUS PRESSURE: 0
SHORTNESS OF BREATH: 0
SINUS PAIN: 0
COLOR CHANGE: 1

## 2025-06-11 ASSESSMENT — PAIN DESCRIPTION - LOCATION: LOCATION: KNEE

## 2025-06-11 ASSESSMENT — PAIN DESCRIPTION - PAIN TYPE: TYPE: CHRONIC PAIN

## 2025-06-11 ASSESSMENT — PAIN SCALES - GENERAL: PAINLEVEL_OUTOF10: 9

## 2025-06-11 ASSESSMENT — PAIN DESCRIPTION - ORIENTATION: ORIENTATION: RIGHT;LEFT

## 2025-06-11 ASSESSMENT — PAIN DESCRIPTION - DESCRIPTORS: DESCRIPTORS: ACHING

## 2025-06-11 ASSESSMENT — PAIN - FUNCTIONAL ASSESSMENT: PAIN_FUNCTIONAL_ASSESSMENT: PREVENTS OR INTERFERES WITH ALL ACTIVE AND SOME PASSIVE ACTIVITIES

## 2025-06-11 NOTE — H&P
Placed This Encounter   Procedures    Culture, MRSA, Screening     Standing Status:   Standing     Number of Occurrences:   1    CBC with Auto Differential     Standing Status:   Standing     Number of Occurrences:   1    Comprehensive Metabolic Panel     Standing Status:   Standing     Number of Occurrences:   1    Hemoglobin A1C     Standing Status:   Standing     Number of Occurrences:   1    Urinalysis with Reflex to Culture     Standing Status:   Standing     Number of Occurrences:   1    EKG 12 Lead     Standing Status:   Standing     Number of Occurrences:   1     Reason for Exam?:   Pre-op        Clearances Requested:    Cardiac (Surgeon ordered)      Medplans Requested:     Xarelto by Cardiology        Discussed with the patient to go to PCP/Urgent Care if the patient's rash does not improve and/or worsens. She should also contact Dr. Barnhart's office to inform them of the rash as well. Patient verbalized understanding.         The purpose of this visit is for preoperative history and physical and does NOT imply medical clearance for their surgical procedure.  Additional clearance from their PCP and/or Specialists may be required based on the findings from this examination. Additionally, adjustments to the patient's preoperative plan of care may occur to best fit the patient's needs.     Extensive preoperative education was provided to the patient during their visit, including answering of all patient questions.      Each medication has been reviewed with the patient. With this in mind, certain medications may require specific recommendations from the prescribing provider in order to ensure positive health outcomes prior to, during, and after their surgery.       Olvin Morataya, MSN, APRN, FNP-C  Nurse Practitioner for Pre-Admission Testing  [facility]  [phone]

## 2025-06-11 NOTE — PERIOP NOTE
S/w Toribio at pt's PCP office, office has clearance paperwork, but provider has not been in to sign pre-op clearance form; per Toribio, office to fax form to PAT (fax # given to Toribio) when provider is in later this week.  LOVN from pt's Cardiologist (David Salazar NP) and ECG  to be faxed to PAT; pt has appt 6/24/25 for pre-op cardiology clearance.  Request for pre-op Xarelto plan faxed to pt's PCP (per pt her PCP prescribes Xarelto for h/o DVTs and pt is unclear historian about h/o paroxysmal AF).    Pt to see PAT NP at 2pm today for pre-op HP.    DOS 7/1/2025.

## 2025-06-11 NOTE — DISCHARGE INSTRUCTIONS
[facility] [place]                   [address] [place] [address]   MAIN OR                                  [phone]   MAIN PRE OP                          [phone]                                                                                AMBULATORY PRE OP          [phone]  PRE-ADMISSION TESTING    [phone]     Surgery Date:  Tuesday 7/1/2025      INSTRUCTIONS BEFORE YOUR SURGERY   Arrival Time [facility] Pre-op staff will call you between 3 and 7pm the day before your surgery with your arrival time. If your surgery is on a Monday, they will call you the Friday before. If it's after 7pm the day prior to surgery and you have not received a time yet, please call [phone].   Where to Check In   Come through the Main Hospital entrance. Take the elevators on the left side of the lobby to the 2nd floor. The admitting desk will be on your right.     Please bring the following items to register:  photo ID, insurance card, co-pay if needed, medical directive, DNR, and/or POA if applicable.     Free  parking is available 7am to 5pm.   Food Drink Alcohol Marijuana    No food or drink (gum, mints, coffee, juice, etc) after midnight the night before surgery.  EXCEPTION:  You may drink WATER ONLY up until 2 hours prior to your surgery time. Please do not add anything to your water as this may result in your surgery being postponed.       No alcohol (beer, wine, liquor) or marijuana 24 hours before or after surgery.       PRE-OP Medication Instructions      MEDICATIONS TO TAKE THE MORNING OF SURGERY WITH WATER:   Duloxetine    Do not take fursosemide or spironolactone the morning of surgery unless you have history of congestive heart failure             Discuss plan for if okay and when to stop taking xarelto prior to surgery with prescribing provider (noting that anesthesia recommends stopping it 3 days prior to surgery due to use of spinal anesthesia if

## 2025-06-12 LAB
BACTERIA SPEC CULT: NORMAL
BACTERIA SPEC CULT: NORMAL
SERVICE CMNT-IMP: NORMAL

## 2025-06-13 ENCOUNTER — TELEPHONE (OUTPATIENT)
Age: 60
End: 2025-06-13

## 2025-06-13 NOTE — PERIOP NOTE
Pt reported in PAT yesterday that she had chafing rash on inner thighs, was seen by her PCP and started triamcinolone cream for it.  Pt reported rash had no open areas and was uncomfortable.  Pt instructed verbally in PAT to let her surgeon know if it worsened or any open area developed, eduction re: SSI provided.  S/w Liana at Dr. Barnhart's office today to report above information.  DOS 7/1/2025   Continue Regimen: Veltin cr aaa face pea size amount qhs after washing with Proactive Detail Level: Zone Discontinue Regimen: Targadox 50mg bid po with food Initiate Treatment: MCN 50mg take 1 po bid \\nKlaron lotion AAA-face qam Otc Regimen: Continue Proactive wash bid

## 2025-06-13 NOTE — TELEPHONE ENCOUNTER
Identified pt with two pt identifiers (name and ). Reviewed chart in preparation for visit and have obtained necessary documentation.    Krista with PAT called to inform our office they saw patient for Pre-Admission testing the other day and patient reported she has a rash with no open wounds reported at this time around Bilateral Thigh area. Patient advised to call our office if she has open sores or wounds before surgery. Patient is scheduled for surgery 25.

## 2025-06-27 RX ORDER — ACETAMINOPHEN 325 MG/1
975 TABLET ORAL ONCE
Status: CANCELLED | OUTPATIENT
Start: 2025-06-27 | End: 2025-06-27

## 2025-06-27 NOTE — PERIOP NOTE
Received call from Dr. Barnhart's office to confirm anesthesia plan to be changed on posting for general.  DOS 7/1/2025

## 2025-06-30 ENCOUNTER — ANESTHESIA EVENT (OUTPATIENT)
Facility: HOSPITAL | Age: 60
End: 2025-06-30
Payer: MEDICARE

## 2025-06-30 NOTE — PERIOP NOTE
Hello,     You are scheduled to have surgery tomorrow at [facility].     We would like for you to arrive at 0730 am  We are located on the second floor, [address]. You will check-in at the registration desk located outside the elevators on the second floor prior to proceeding to [address].  Remember nothing to eat or drink after midnight. If you need to take medications the morning of surgery, please take with a few sips of water.   Wear loose, comfortable clothing and leave all your jewelry at home.   You may bring your cell phone with you.  One family member will be allowed in the pre-op area once you are dressed and your IV has been started.   You will need someone to drive you home and be with you for 24 hours post-anesthesia.     We look forward to seeing you! Call [phone] for questions after hours and [phone] between 5:30AM and 6PM.     Thanks!    [facility] PREOP TEAM

## 2025-07-01 ENCOUNTER — APPOINTMENT (OUTPATIENT)
Facility: HOSPITAL | Age: 60
End: 2025-07-01
Attending: ORTHOPAEDIC SURGERY
Payer: MEDICARE

## 2025-07-01 ENCOUNTER — HOSPITAL ENCOUNTER (OUTPATIENT)
Facility: HOSPITAL | Age: 60
Discharge: HOME | End: 2025-07-01
Attending: ORTHOPAEDIC SURGERY | Admitting: ORTHOPAEDIC SURGERY
Payer: MEDICARE

## 2025-07-01 ENCOUNTER — ANESTHESIA (OUTPATIENT)
Facility: HOSPITAL | Age: 60
End: 2025-07-01
Payer: MEDICARE

## 2025-07-01 VITALS
BODY MASS INDEX: 44.19 KG/M2 | SYSTOLIC BLOOD PRESSURE: 148 MMHG | HEART RATE: 88 BPM | HEIGHT: 60 IN | OXYGEN SATURATION: 97 % | RESPIRATION RATE: 15 BRPM | DIASTOLIC BLOOD PRESSURE: 86 MMHG | WEIGHT: 225.09 LBS | TEMPERATURE: 97.6 F

## 2025-07-01 DIAGNOSIS — Z96.651 STATUS POST RIGHT KNEE REPLACEMENT: Primary | ICD-10-CM

## 2025-07-01 PROCEDURE — 27447 TOTAL KNEE ARTHROPLASTY: CPT | Performed by: ORTHOPAEDIC SURGERY

## 2025-07-01 PROCEDURE — 7100000001 HC PACU RECOVERY - ADDTL 15 MIN: Performed by: ORTHOPAEDIC SURGERY

## 2025-07-01 PROCEDURE — 7100000000 HC PACU RECOVERY - FIRST 15 MIN: Performed by: ORTHOPAEDIC SURGERY

## 2025-07-01 PROCEDURE — 2500000003 HC RX 250 WO HCPCS: Performed by: NURSE ANESTHETIST, CERTIFIED REGISTERED

## 2025-07-01 PROCEDURE — 3600000014 HC SURGERY LEVEL 4 ADDTL 15MIN: Performed by: ORTHOPAEDIC SURGERY

## 2025-07-01 PROCEDURE — 6360000002 HC RX W HCPCS: Performed by: ORTHOPAEDIC SURGERY

## 2025-07-01 PROCEDURE — 7100000010 HC PHASE II RECOVERY - FIRST 15 MIN: Performed by: ORTHOPAEDIC SURGERY

## 2025-07-01 PROCEDURE — 2709999900 HC NON-CHARGEABLE SUPPLY: Performed by: ORTHOPAEDIC SURGERY

## 2025-07-01 PROCEDURE — 97530 THERAPEUTIC ACTIVITIES: CPT

## 2025-07-01 PROCEDURE — 6360000002 HC RX W HCPCS: Performed by: ANESTHESIOLOGY

## 2025-07-01 PROCEDURE — 2580000003 HC RX 258: Performed by: NURSE ANESTHETIST, CERTIFIED REGISTERED

## 2025-07-01 PROCEDURE — C1713 ANCHOR/SCREW BN/BN,TIS/BN: HCPCS | Performed by: ORTHOPAEDIC SURGERY

## 2025-07-01 PROCEDURE — 3700000001 HC ADD 15 MINUTES (ANESTHESIA): Performed by: ORTHOPAEDIC SURGERY

## 2025-07-01 PROCEDURE — C1776 JOINT DEVICE (IMPLANTABLE): HCPCS | Performed by: ORTHOPAEDIC SURGERY

## 2025-07-01 PROCEDURE — 97116 GAIT TRAINING THERAPY: CPT

## 2025-07-01 PROCEDURE — 3700000000 HC ANESTHESIA ATTENDED CARE: Performed by: ORTHOPAEDIC SURGERY

## 2025-07-01 PROCEDURE — 3600000004 HC SURGERY LEVEL 4 BASE: Performed by: ORTHOPAEDIC SURGERY

## 2025-07-01 PROCEDURE — 6360000002 HC RX W HCPCS: Performed by: NURSE ANESTHETIST, CERTIFIED REGISTERED

## 2025-07-01 PROCEDURE — 7100000011 HC PHASE II RECOVERY - ADDTL 15 MIN: Performed by: ORTHOPAEDIC SURGERY

## 2025-07-01 PROCEDURE — 6370000000 HC RX 637 (ALT 250 FOR IP): Performed by: ORTHOPAEDIC SURGERY

## 2025-07-01 PROCEDURE — 64447 NJX AA&/STRD FEMORAL NRV IMG: CPT | Performed by: ANESTHESIOLOGY

## 2025-07-01 PROCEDURE — 97161 PT EVAL LOW COMPLEX 20 MIN: CPT

## 2025-07-01 PROCEDURE — 73560 X-RAY EXAM OF KNEE 1 OR 2: CPT

## 2025-07-01 PROCEDURE — 2500000003 HC RX 250 WO HCPCS: Performed by: ORTHOPAEDIC SURGERY

## 2025-07-01 DEVICE — UNIVERSAL TIBIAL BASEPLATE
Type: IMPLANTABLE DEVICE | Site: PATELLA | Status: FUNCTIONAL
Brand: TRIATHLON

## 2025-07-01 DEVICE — TIBIAL BEARING INSERT - CS
Type: IMPLANTABLE DEVICE | Site: KNEE | Status: FUNCTIONAL
Brand: TRIATHLON

## 2025-07-01 DEVICE — CRUCIATE RETAINING FEMORAL
Type: IMPLANTABLE DEVICE | Site: KNEE | Status: FUNCTIONAL
Brand: TRIATHLON

## 2025-07-01 DEVICE — SPEEDSET FULL DOSE ANTIBIOTIC BONE CEMENT, 10 PACK CATALOG NUMBER IS 6192-1-010
Type: IMPLANTABLE DEVICE | Site: KNEE | Status: FUNCTIONAL
Brand: SIMPLEX

## 2025-07-01 DEVICE — SYMMETRIC PATELLA
Type: IMPLANTABLE DEVICE | Site: PATELLA | Status: FUNCTIONAL
Brand: TRIATHLON

## 2025-07-01 RX ORDER — SODIUM CHLORIDE 0.9 % (FLUSH) 0.9 %
5-40 SYRINGE (ML) INJECTION PRN
Status: DISCONTINUED | OUTPATIENT
Start: 2025-07-01 | End: 2025-07-01 | Stop reason: HOSPADM

## 2025-07-01 RX ORDER — ONDANSETRON 2 MG/ML
INJECTION INTRAMUSCULAR; INTRAVENOUS
Status: DISCONTINUED | OUTPATIENT
Start: 2025-07-01 | End: 2025-07-01 | Stop reason: SDUPTHER

## 2025-07-01 RX ORDER — ACETAMINOPHEN 500 MG
1000 TABLET ORAL ONCE
Status: COMPLETED | OUTPATIENT
Start: 2025-07-01 | End: 2025-07-01

## 2025-07-01 RX ORDER — ROPIVACAINE HYDROCHLORIDE 5 MG/ML
INJECTION, SOLUTION EPIDURAL; INFILTRATION; PERINEURAL
Status: DISCONTINUED | OUTPATIENT
Start: 2025-07-01 | End: 2025-07-01 | Stop reason: SDUPTHER

## 2025-07-01 RX ORDER — TRAMADOL HYDROCHLORIDE 50 MG/1
50 TABLET ORAL EVERY 4 HOURS PRN
Qty: 42 TABLET | Refills: 0 | Status: SHIPPED | OUTPATIENT
Start: 2025-07-01 | End: 2025-07-08

## 2025-07-01 RX ORDER — MIDAZOLAM HYDROCHLORIDE 1 MG/ML
INJECTION, SOLUTION INTRAMUSCULAR; INTRAVENOUS
Status: DISCONTINUED | OUTPATIENT
Start: 2025-07-01 | End: 2025-07-01 | Stop reason: SDUPTHER

## 2025-07-01 RX ORDER — FENTANYL CITRATE 50 UG/ML
25 INJECTION, SOLUTION INTRAMUSCULAR; INTRAVENOUS EVERY 5 MIN PRN
Status: DISCONTINUED | OUTPATIENT
Start: 2025-07-01 | End: 2025-07-01 | Stop reason: HOSPADM

## 2025-07-01 RX ORDER — KETOROLAC TROMETHAMINE 30 MG/ML
30 INJECTION, SOLUTION INTRAMUSCULAR; INTRAVENOUS EVERY 6 HOURS PRN
Status: CANCELLED | OUTPATIENT
Start: 2025-07-01 | End: 2025-07-06

## 2025-07-01 RX ORDER — MIDAZOLAM HYDROCHLORIDE 2 MG/2ML
2 INJECTION, SOLUTION INTRAMUSCULAR; INTRAVENOUS
Status: DISCONTINUED | OUTPATIENT
Start: 2025-07-01 | End: 2025-07-01 | Stop reason: HOSPADM

## 2025-07-01 RX ORDER — TRAMADOL HYDROCHLORIDE 50 MG/1
50 TABLET ORAL EVERY 6 HOURS PRN
Status: CANCELLED | OUTPATIENT
Start: 2025-07-01

## 2025-07-01 RX ORDER — OXYCODONE HYDROCHLORIDE 5 MG/1
10 TABLET ORAL
Status: DISCONTINUED | OUTPATIENT
Start: 2025-07-01 | End: 2025-07-01 | Stop reason: HOSPADM

## 2025-07-01 RX ORDER — ACETAMINOPHEN 325 MG/1
650 TABLET ORAL EVERY 6 HOURS
Status: CANCELLED | OUTPATIENT
Start: 2025-07-01

## 2025-07-01 RX ORDER — ROPIVACAINE HYDROCHLORIDE 5 MG/ML
INJECTION, SOLUTION EPIDURAL; INFILTRATION; PERINEURAL PRN
Status: DISCONTINUED | OUTPATIENT
Start: 2025-07-01 | End: 2025-07-01 | Stop reason: HOSPADM

## 2025-07-01 RX ORDER — ONDANSETRON 2 MG/ML
4 INJECTION INTRAMUSCULAR; INTRAVENOUS EVERY 6 HOURS PRN
Status: CANCELLED | OUTPATIENT
Start: 2025-07-01

## 2025-07-01 RX ORDER — MORPHINE SULFATE 15 MG/1
15 TABLET, FILM COATED, EXTENDED RELEASE ORAL EVERY 12 HOURS SCHEDULED
Refills: 0 | Status: CANCELLED | OUTPATIENT
Start: 2025-07-01

## 2025-07-01 RX ORDER — SODIUM CHLORIDE 0.9 % (FLUSH) 0.9 %
5-40 SYRINGE (ML) INJECTION PRN
Status: CANCELLED | OUTPATIENT
Start: 2025-07-01

## 2025-07-01 RX ORDER — SODIUM CHLORIDE 0.9 % (FLUSH) 0.9 %
5-40 SYRINGE (ML) INJECTION EVERY 12 HOURS SCHEDULED
Status: CANCELLED | OUTPATIENT
Start: 2025-07-01

## 2025-07-01 RX ORDER — ONDANSETRON 4 MG/1
4 TABLET, ORALLY DISINTEGRATING ORAL EVERY 8 HOURS PRN
Status: CANCELLED | OUTPATIENT
Start: 2025-07-01

## 2025-07-01 RX ORDER — CELECOXIB 100 MG/1
100 CAPSULE ORAL ONCE
Status: DISCONTINUED | OUTPATIENT
Start: 2025-07-01 | End: 2025-07-01

## 2025-07-01 RX ORDER — KETOROLAC TROMETHAMINE 30 MG/ML
30 INJECTION, SOLUTION INTRAMUSCULAR; INTRAVENOUS ONCE
Status: COMPLETED | OUTPATIENT
Start: 2025-07-01 | End: 2025-07-01

## 2025-07-01 RX ORDER — BISACODYL 5 MG/1
5 TABLET, DELAYED RELEASE ORAL DAILY
Status: CANCELLED | OUTPATIENT
Start: 2025-07-01

## 2025-07-01 RX ORDER — TRANEXAMIC ACID 10 MG/ML
1000 INJECTION, SOLUTION INTRAVENOUS
Status: DISCONTINUED | OUTPATIENT
Start: 2025-07-01 | End: 2025-07-01 | Stop reason: HOSPADM

## 2025-07-01 RX ORDER — BUPIVACAINE HYDROCHLORIDE 5 MG/ML
INJECTION, SOLUTION EPIDURAL; INTRACAUDAL; PERINEURAL
Status: DISCONTINUED | OUTPATIENT
Start: 2025-07-01 | End: 2025-07-01 | Stop reason: SDUPTHER

## 2025-07-01 RX ORDER — SODIUM CHLORIDE 9 MG/ML
INJECTION, SOLUTION INTRAVENOUS PRN
Status: CANCELLED | OUTPATIENT
Start: 2025-07-01

## 2025-07-01 RX ORDER — SODIUM CHLORIDE 9 MG/ML
INJECTION, SOLUTION INTRAVENOUS PRN
Status: DISCONTINUED | OUTPATIENT
Start: 2025-07-01 | End: 2025-07-01 | Stop reason: HOSPADM

## 2025-07-01 RX ORDER — NALOXONE HYDROCHLORIDE 0.4 MG/ML
INJECTION, SOLUTION INTRAMUSCULAR; INTRAVENOUS; SUBCUTANEOUS PRN
Status: DISCONTINUED | OUTPATIENT
Start: 2025-07-01 | End: 2025-07-01 | Stop reason: HOSPADM

## 2025-07-01 RX ORDER — ONDANSETRON 4 MG/1
4 TABLET, FILM COATED ORAL EVERY 8 HOURS PRN
Qty: 60 TABLET | Refills: 0 | Status: SHIPPED | OUTPATIENT
Start: 2025-07-01

## 2025-07-01 RX ORDER — LIDOCAINE HYDROCHLORIDE 10 MG/ML
1 INJECTION, SOLUTION EPIDURAL; INFILTRATION; INTRACAUDAL; PERINEURAL
Status: DISCONTINUED | OUTPATIENT
Start: 2025-07-01 | End: 2025-07-01 | Stop reason: HOSPADM

## 2025-07-01 RX ORDER — MORPHINE SULFATE 15 MG/1
15 TABLET ORAL EVERY 12 HOURS PRN
Qty: 10 TABLET | Refills: 0 | Status: SHIPPED | OUTPATIENT
Start: 2025-07-01 | End: 2025-07-06

## 2025-07-01 RX ORDER — DIPHENHYDRAMINE HYDROCHLORIDE 50 MG/ML
12.5 INJECTION, SOLUTION INTRAMUSCULAR; INTRAVENOUS
Status: DISCONTINUED | OUTPATIENT
Start: 2025-07-01 | End: 2025-07-01 | Stop reason: HOSPADM

## 2025-07-01 RX ORDER — FENTANYL CITRATE 50 UG/ML
INJECTION, SOLUTION INTRAMUSCULAR; INTRAVENOUS
Status: DISCONTINUED | OUTPATIENT
Start: 2025-07-01 | End: 2025-07-01 | Stop reason: SDUPTHER

## 2025-07-01 RX ORDER — SODIUM CHLORIDE, SODIUM LACTATE, POTASSIUM CHLORIDE, CALCIUM CHLORIDE 600; 310; 30; 20 MG/100ML; MG/100ML; MG/100ML; MG/100ML
INJECTION, SOLUTION INTRAVENOUS CONTINUOUS
Status: DISCONTINUED | OUTPATIENT
Start: 2025-07-01 | End: 2025-07-01 | Stop reason: HOSPADM

## 2025-07-01 RX ORDER — ASPIRIN 81 MG/1
81 TABLET ORAL 2 TIMES DAILY
Status: CANCELLED | OUTPATIENT
Start: 2025-07-01

## 2025-07-01 RX ORDER — SODIUM CHLORIDE 0.9 % (FLUSH) 0.9 %
5-40 SYRINGE (ML) INJECTION EVERY 12 HOURS SCHEDULED
Status: DISCONTINUED | OUTPATIENT
Start: 2025-07-01 | End: 2025-07-01 | Stop reason: HOSPADM

## 2025-07-01 RX ORDER — DEXAMETHASONE SODIUM PHOSPHATE 10 MG/ML
8 INJECTION, SOLUTION INTRAMUSCULAR; INTRAVENOUS ONCE
Status: COMPLETED | OUTPATIENT
Start: 2025-07-01 | End: 2025-07-01

## 2025-07-01 RX ORDER — ONDANSETRON 2 MG/ML
4 INJECTION INTRAMUSCULAR; INTRAVENOUS
Status: DISCONTINUED | OUTPATIENT
Start: 2025-07-01 | End: 2025-07-01 | Stop reason: HOSPADM

## 2025-07-01 RX ORDER — SODIUM CHLORIDE, SODIUM LACTATE, POTASSIUM CHLORIDE, CALCIUM CHLORIDE 600; 310; 30; 20 MG/100ML; MG/100ML; MG/100ML; MG/100ML
INJECTION, SOLUTION INTRAVENOUS
Status: DISCONTINUED | OUTPATIENT
Start: 2025-07-01 | End: 2025-07-01 | Stop reason: SDUPTHER

## 2025-07-01 RX ORDER — POLYETHYLENE GLYCOL 3350 17 G/17G
17 POWDER, FOR SOLUTION ORAL 2 TIMES DAILY
Qty: 510 G | Refills: 0 | Status: SHIPPED | OUTPATIENT
Start: 2025-07-01 | End: 2025-07-16

## 2025-07-01 RX ORDER — KETOROLAC TROMETHAMINE 15 MG/ML
15 INJECTION, SOLUTION INTRAMUSCULAR; INTRAVENOUS
Status: DISCONTINUED | OUTPATIENT
Start: 2025-07-01 | End: 2025-07-01 | Stop reason: HOSPADM

## 2025-07-01 RX ORDER — FENTANYL CITRATE 50 UG/ML
100 INJECTION, SOLUTION INTRAMUSCULAR; INTRAVENOUS
Status: DISCONTINUED | OUTPATIENT
Start: 2025-07-01 | End: 2025-07-01 | Stop reason: HOSPADM

## 2025-07-01 RX ORDER — FAMOTIDINE 20 MG/1
20 TABLET, FILM COATED ORAL 2 TIMES DAILY
Status: CANCELLED | OUTPATIENT
Start: 2025-07-01

## 2025-07-01 RX ORDER — PROPOFOL 10 MG/ML
INJECTION, EMULSION INTRAVENOUS
Status: DISCONTINUED | OUTPATIENT
Start: 2025-07-01 | End: 2025-07-01 | Stop reason: SDUPTHER

## 2025-07-01 RX ORDER — DEXMEDETOMIDINE HYDROCHLORIDE 100 UG/ML
INJECTION, SOLUTION INTRAVENOUS
Status: DISCONTINUED | OUTPATIENT
Start: 2025-07-01 | End: 2025-07-01 | Stop reason: SDUPTHER

## 2025-07-01 RX ORDER — PHENYLEPHRINE HCL IN 0.9% NACL 0.4MG/10ML
SYRINGE (ML) INTRAVENOUS
Status: DISCONTINUED | OUTPATIENT
Start: 2025-07-01 | End: 2025-07-01 | Stop reason: SDUPTHER

## 2025-07-01 RX ORDER — ACETAMINOPHEN 325 MG/1
975 TABLET ORAL ONCE
Status: DISCONTINUED | OUTPATIENT
Start: 2025-07-01 | End: 2025-07-01

## 2025-07-01 RX ADMIN — ONDANSETRON HYDROCHLORIDE 4 MG: 2 SOLUTION INTRAMUSCULAR; INTRAVENOUS at 12:15

## 2025-07-01 RX ADMIN — CEFAZOLIN SODIUM 2000 MG: 1 POWDER, FOR SOLUTION INTRAMUSCULAR; INTRAVENOUS at 10:35

## 2025-07-01 RX ADMIN — ROPIVACAINE HYDROCHLORIDE 30 ML: 5 INJECTION, SOLUTION EPIDURAL; INFILTRATION; PERINEURAL at 09:47

## 2025-07-01 RX ADMIN — DEXMEDETOMIDINE 6 MCG: 100 INJECTION, SOLUTION INTRAVENOUS at 10:23

## 2025-07-01 RX ADMIN — PROPOFOL 100 MCG/KG/MIN: 10 INJECTION, EMULSION INTRAVENOUS at 10:31

## 2025-07-01 RX ADMIN — DEXAMETHASONE SODIUM PHOSPHATE 8 MG: 10 INJECTION INTRAMUSCULAR; INTRAVENOUS at 10:40

## 2025-07-01 RX ADMIN — ACETAMINOPHEN 1000 MG: 500 TABLET ORAL at 08:25

## 2025-07-01 RX ADMIN — Medication 80 MCG: at 10:51

## 2025-07-01 RX ADMIN — Medication 120 MCG: at 11:15

## 2025-07-01 RX ADMIN — Medication 40 MCG: at 11:49

## 2025-07-01 RX ADMIN — BUPIVACAINE HYDROCHLORIDE 2 ML: 5 INJECTION, SOLUTION EPIDURAL; INTRACAUDAL; PERINEURAL at 10:21

## 2025-07-01 RX ADMIN — SODIUM CHLORIDE, POTASSIUM CHLORIDE, SODIUM LACTATE AND CALCIUM CHLORIDE: 600; 310; 30; 20 INJECTION, SOLUTION INTRAVENOUS at 10:00

## 2025-07-01 RX ADMIN — MIDAZOLAM HYDROCHLORIDE 2 MG: 1 INJECTION, SOLUTION INTRAMUSCULAR; INTRAVENOUS at 09:41

## 2025-07-01 RX ADMIN — Medication 80 MCG: at 10:55

## 2025-07-01 RX ADMIN — FENTANYL CITRATE 100 MCG: 50 INJECTION, SOLUTION INTRAMUSCULAR; INTRAVENOUS at 09:41

## 2025-07-01 RX ADMIN — KETOROLAC TROMETHAMINE 30 MG: 30 INJECTION, SOLUTION INTRAMUSCULAR at 11:10

## 2025-07-01 RX ADMIN — Medication 80 MCG: at 11:00

## 2025-07-01 RX ADMIN — Medication 40 MCG: at 11:36

## 2025-07-01 RX ADMIN — Medication 80 MCG: at 10:30

## 2025-07-01 RX ADMIN — PROPOFOL 40 MG: 10 INJECTION, EMULSION INTRAVENOUS at 10:30

## 2025-07-01 RX ADMIN — MIDAZOLAM HYDROCHLORIDE 1 MG: 1 INJECTION, SOLUTION INTRAMUSCULAR; INTRAVENOUS at 10:04

## 2025-07-01 RX ADMIN — Medication 80 MCG: at 11:22

## 2025-07-01 RX ADMIN — MIDAZOLAM HYDROCHLORIDE 1 MG: 1 INJECTION, SOLUTION INTRAMUSCULAR; INTRAVENOUS at 09:59

## 2025-07-01 ASSESSMENT — PAIN SCALES - GENERAL
PAINLEVEL_OUTOF10: 0
PAINLEVEL_OUTOF10: 3
PAINLEVEL_OUTOF10: 2
PAINLEVEL_OUTOF10: 1

## 2025-07-01 ASSESSMENT — PAIN DESCRIPTION - DESCRIPTORS
DESCRIPTORS: PRESSURE
DESCRIPTORS: PRESSURE

## 2025-07-01 ASSESSMENT — PAIN DESCRIPTION - ORIENTATION
ORIENTATION: LOWER;RIGHT
ORIENTATION: RIGHT;INNER
ORIENTATION: RIGHT;INNER

## 2025-07-01 ASSESSMENT — PAIN - FUNCTIONAL ASSESSMENT
PAIN_FUNCTIONAL_ASSESSMENT: 0-10
PAIN_FUNCTIONAL_ASSESSMENT: ACTIVITIES ARE NOT PREVENTED

## 2025-07-01 ASSESSMENT — PAIN DESCRIPTION - LOCATION
LOCATION: KNEE

## 2025-07-01 ASSESSMENT — PAIN DESCRIPTION - PAIN TYPE: TYPE: ACUTE PAIN;SURGICAL PAIN

## 2025-07-01 NOTE — PERIOP NOTE
PACU-IN REPORT FROM ANESTHESIA    Verbal report received from   Bebo   [] MD/-Anesthesiologist    [x] CRNA   [] with student    CHOICE ANESTHESIA:  [] GENERAL  [] TIVA  [x] MAC  [] LOCAL  [x] REGIONAL  [x] SPINAL   [] EPIDURAL   **Note the anesthesia record for medications given intraoperatively.**           [] E.R.A.S. PROTOCOL    SURGICAL PROCEDURE: Procedure(s) (LRB):  RIGHT TOTAL KNEE ARTHROPLASTY (FAST TRACK) (Right)     SURGEON: Claus Barnhart DO.    Brief Initial Visual Assessment:    Patient Age: [] Infant(1-12mo)       []Pediatric(1-13yrs)    [] Adolescent(13-18yrs)     [x] Adult(18-65yrs)      []Geriatric Adult(>65yrs).                   Patient    [x] Alert           [x]Calm & Cooperative      [] Anxious/Restless      [] Combative  Appearance:  [x] Drowsy      [] Confused/Disoriented     [] Sedated      [] Unresponsive     Oriented x  3            Airway:     [x] Patent          [] \"Difficult Airway\" report by Anesthesia                        [] Obstructs easily/Obstructed on arrival          [] Manual stimulation and/or airway assistance necessary                         [] Airway improved with head/airway repositioning                       Airway Adjuncts Present: [] Oral Airway    [] Nasal Trumpet    [] ETT    [] LMA            Respiratory  [x] Even   [] Labored   [] Shallow   [] Tachypnea (>28 RR/min)  [] Bradypnea (<10 RR/min)  Pattern:    [x] Non-Labored  [] VENT and/or respiratory assistance     being provided.        Skin:     [x] Pink [] Dusky    [] Pale         [x] Warm     [] Hot [] Cool       [] Cold    [x]Dry     [] Moist [] Diaphoretic     Membranes:  [x] Pink    [] Pale        [x] Moist [] Dry     [] Crusty     Pain:   [x] No Acute Discomfort.    0  /10 Scale [x] Verbal Numeric / Visual   [] Moderate Discomfort.      [] V.A.S.    [] Acute Discomfort.                 [] A.N.V.    [] Chronic-Issue Related Discomfort.   [] F.L.A.C.C.   Note E-MAR for medications administered.  []Faces,

## 2025-07-01 NOTE — DISCHARGE INSTRUCTIONS
your anesthetized limb.    Caring For a Blocked Body Part    General Considerations:  The numbness may last up to 24 hours  You must protect your arm or leg.  The blocked extremity is numb, weak, and difficult for your brain to locate and communicate with.  To do this you should:  Pay attention to the position of the blocked limb at all times.  Be very careful when placing hot or cod items on a numb limb.  You could cause tissue damage like burns or frostbite if you are unable to feel temperature.  Carefully follow your discharge instructions regarding the use of these therapies in you post-operative care.  Carefully pad the affected limb.  Normally we continually move and adjust the position of our bodies without thinking about it.  This movement and continuous repositioning helps to prevent injuries from immobility.  When a limb is numb it still requires this care  Be extremely careful not to bump or hit the numb body part.  This can result in an unrecognized injury, at lease until the blocked limb wakes up.  It can also result in worse pain of your already post-surgical limb.    Knee/Foot Blocks:  DO NOT use the blocked leg to walk, balance or support yourself. Your leg will not be able to balance your weight properly while any part of your leg is numb.  You are at a RISK for FALLS.  Be careful not to drag your foot along the ground or stub your toes.    Contact Phone Numbers    CALL 911 IN CASE OF AN EMERGENCY.    For all other non-emergency inquiries call the [facility]  at [phone] and ask for the anesthesiologist on call to be paged.      MEDICATION AND SIDE EFFECT GUIDE    The [facility] MEDICATION AND SIDE EFFECT GUIDE was provided to the PATIENT AND CARE PROVIDER.  Information provided includes instruction about drug purpose and common side effects for the following medications:   Acetaminophen  Tramadol  Morphine ER  Glycolax  Zofran          These are general instructions for

## 2025-07-01 NOTE — ANESTHESIA PRE PROCEDURE
Lymphedema of both lower extremities 12/31/2019    Morbid obesity (HCC)     FOREIGN on CPAP     PAF (paroxysmal atrial fibrillation) (HCC)     Pulmonary embolism (HCC) 2017    PVD (peripheral vascular disease)        Past Surgical History:        Procedure Laterality Date    BREAST BIOPSY Bilateral     2004 stereo bx?     COLONOSCOPY N/A 7/30/2020    COLONOSCOPY performed by Eugenio Mancilla MD at [facility] ENDOSCOPY    ORTHOPEDIC SURGERY      right hand tendon repair and screw in wrist and took piece of bone from hip f       Social History:    Social History     Tobacco Use    Smoking status: Never    Smokeless tobacco: Never   Substance Use Topics    Alcohol use: Yes     Alcohol/week: 21.0 standard drinks of alcohol                                Counseling given: Not Answered      Vital Signs (Current):   Vitals:    07/01/25 0813   BP: (!) 136/96   Pulse: 70   Resp: 18   Temp: 98.1 °F (36.7 °C)   TempSrc: Oral   SpO2: 99%   Weight: 102.1 kg (225 lb 1.4 oz)   Height: 1.524 m (5')                                              BP Readings from Last 3 Encounters:   07/01/25 (!) 136/96   06/11/25 137/82   03/30/25 (!) 127/95       NPO Status: Time of last liquid consumption: 0600                        Time of last solid consumption: 2100                        Date of last liquid consumption: 07/01/25                        Date of last solid food consumption: 06/30/25    BMI:   Wt Readings from Last 3 Encounters:   07/01/25 102.1 kg (225 lb 1.4 oz)   06/11/25 103.8 kg (228 lb 12.8 oz)   11/30/24 105.2 kg (232 lb)     Body mass index is 43.96 kg/m².    CBC:   Lab Results   Component Value Date/Time    WBC 7.2 06/11/2025 10:14 AM    RBC 4.21 06/11/2025 10:14 AM    HGB 12.8 06/11/2025 10:14 AM    HCT 37.9 06/11/2025 10:14 AM    MCV 90.0 06/11/2025 10:14 AM    RDW 13.4 06/11/2025 10:14 AM     06/11/2025 10:14 AM       CMP:   Lab Results   Component Value Date/Time     06/11/2025 10:14 AM    K 4.2 06/11/2025 10:14

## 2025-07-01 NOTE — ANESTHESIA PROCEDURE NOTES
Peripheral Block    Patient location during procedure: pre-op  Reason for block: procedure for pain, post-op pain management, primary anesthetic and at surgeon's request  Start time: 7/1/2025 9:41 AM  End time: 7/1/2025 9:47 AM  Staffing  Performed: anesthesiologist   Anesthesiologist: Jaun Michaud MD  Performed by: Jaun Michaud MD  Authorized by: Jaun Michaud MD    Preanesthetic Checklist  Completed: patient identified, IV checked, site marked, risks and benefits discussed, surgical/procedural consents, pre-op evaluation, timeout performed, anesthesia consent given, oxygen available and monitors applied/VS acknowledged  Peripheral Block   Patient position: supine  Prep: ChloraPrep  Provider prep: mask and sterile gloves  Patient monitoring: cardiac monitor, continuous pulse ox, continuous capnometry, frequent blood pressure checks, IV access, oxygen and responsive to questions  Block type: Femoral  Adductor canal  Laterality: right  Injection technique: single-shot  Guidance: ultrasound guided    Needle   Needle type: Other   Needle gauge: 21 G  Needle localization: ultrasound guidance  Needle length: 10 cmOther needle type: STIMUPLEX  Assessment   Injection assessment: negative aspiration for heme, no paresthesia on injection, local visualized surrounding nerve on ultrasound and no intravascular symptoms  Hemodynamics: stable  Outcomes: patient tolerated procedure well    Additional Notes  Anais DUMONT witnessed timeout and block written on correct side

## 2025-07-01 NOTE — ANESTHESIA POSTPROCEDURE EVALUATION
Department of Anesthesiology  Postprocedure Note    Patient: Camilla Santiago  MRN: [id number]  YOB: 1965  Date of evaluation: 7/1/2025    Procedure Summary       Date: 07/01/25 Room / Location: [facility] ASU OR  / [facility] AMBULATORY OR    Anesthesia Start: 1023 Anesthesia Stop: 1211    Procedure: RIGHT TOTAL KNEE ARTHROPLASTY (FAST TRACK) (Right: Knee) Diagnosis:       Unilateral primary osteoarthritis, right knee      (Unilateral primary osteoarthritis, right knee [M17.11])    Surgeons: Claus Barnhart DO Responsible Provider: Jaun Michaud MD    Anesthesia Type: Spinal, Regional ASA Status: 3            Anesthesia Type: Spinal, Regional    Latrell Phase I: Latrell Score: 9    Latrell Phase II:      Anesthesia Post Evaluation    Patient location during evaluation: PACU  Patient participation: complete - patient participated  Level of consciousness: awake  Pain score: 0  Airway patency: patent  Nausea & Vomiting: no nausea and no vomiting  Cardiovascular status: blood pressure returned to baseline  Respiratory status: acceptable  Hydration status: euvolemic  Multimodal analgesia pain management approach  Pain management: adequate    No notable events documented.

## 2025-07-01 NOTE — ANESTHESIA PROCEDURE NOTES
Spinal Block    Patient location during procedure: pre-op  End time: 7/1/2025 10:19 AM  Reason for block: post-op pain management, primary anesthetic and at surgeon's request  Staffing  Performed: resident/CRNA   Resident/CRNA: Seble Lomax APRN - CRNA  Performed by: Seble Lomax APRN - CRNA  Authorized by: Jaun Michaud MD    Spinal Block  Patient position: sitting  Prep: DuraPrep  Patient monitoring: cardiac monitor, continuous pulse ox, continuous capnometry, frequent blood pressure checks and oxygen  Approach: midline  Location: L3/L4  Provider prep: mask and sterile gloves  Needle  Needle type: Quincke   Needle gauge: 22 G  Needle length: 3.5 in  Assessment  Swirl obtained: Yes  CSF: clear  Attempts: 2  Hemodynamics: stable  Preanesthetic Checklist  Completed: patient identified, IV checked, site marked, risks and benefits discussed, surgical/procedural consents, pre-op evaluation, timeout performed, anesthesia consent given, oxygen available and monitors applied/VS acknowledged

## 2025-07-01 NOTE — PROGRESS NOTES
PHYSICAL THERAPY EVALUATION/DISCHARGE    Patient: Camilla Santiago (59 y.o. female)  Date: 7/1/2025  Primary Diagnosis: Unilateral primary osteoarthritis, right knee [M17.11]  Procedure(s) (LRB):  RIGHT TOTAL KNEE ARTHROPLASTY (FAST TRACK) (Right) * Day of Surgery *   Precautions: Weight Bearing Right Lower Extremity Weight Bearing: Weight Bearing As Tolerated                    ASSESSMENT AND RECOMMENDATIONS:  Based on the objective data below, the patient presents with safe supervised mobility following admission for a right TKA. Pain and BP were well controlled to allow for activity progression. Gait training completed x70' using a RW and requiring supervision/set-up. She also has end stage DJD of the left knee and this effects gait quality. Discussed erect posture and transition to a reciprocal pattern with right knee flexion during swing. Demonstrated preferred stair training technique but her limited left knee flexion limited this d/t circumduction to clear the left foot. She safely accented with left circumduction and descended backwards. Family was present and agreed to be with the patient when completing stairs at home. Education provided regarding initial HEP and encouraged progressive mobility as pain allows with verbalized understanding. She is mobilizing well and clear for discharge home.    Further skilled acute physical therapy is not indicated at this time.       PLAN :  Recommendation for discharge: (in order for the patient to meet his/her long term goals):  Intermittent physical therapy up to 2-3x/week in previous living setting  Other factors to consider for discharge: no additional factors    IF patient discharges home will need the following DME: rolling walker       SUBJECTIVE:   Patient stated “I was going up the stairs with my right leg first before surgery.”    OBJECTIVE DATA SUMMARY:     Past Medical History:   Diagnosis Date    Arthritis of right knee     Asthma     Cellulitis 2017

## 2025-07-01 NOTE — OP NOTE
Operative Note      Patient: Camilla Santiago  YOB: 1965  MRN: [id number]    Date of Procedure: 7/1/2025    Pre-Op Diagnosis Codes:      * Unilateral primary osteoarthritis, right knee [M17.11]    Post-Op Diagnosis: Same       Procedure(s):  RIGHT TOTAL KNEE ARTHROPLASTY (FAST TRACK)    Surgeon(s):  Claus Barnhart DO    Assistant:   Surgical Assistant: Oxana Washington    Anesthesia: General    Estimated Blood Loss (mL): less than 100     Complications: None    Specimens:   * No specimens in log *    Implants:  Implant Name Type Inv. Item Serial No.  Lot No. LRB No. Used Action   COMPONENT FEM SZ 3 R KNEE CRUCE RET MARVA TRIATHLON - SNA  COMPONENT FEM SZ 3 R KNEE CRUCE RET MARVA TRIATHLON NA [employer] [id number] Right 1 Implanted   IMPLANT PAT [id number] X3 SYMMETRIC TRIATHLON - SNA  IMPLANT PAT [id number] THK8MM X3 SYMMETRIC TRIATHLON NA [employer] [id number] Right 1 Implanted   BASEPLATE TIB SZ 3 UNIV KNEE TRITANIUM TOT STBL MARVA - SNA  BASEPLATE TIB SZ 3 UNIV KNEE TRITANIUM TOT STBL MARVA NA [employer] CIHI [id number] Right 1 Implanted   CEMENT BNE RADIOPAQUE SIMPLEX P SPEEDDET - SNA  CEMENT BNE RADIOPAQUE SIMPLEX P SPEEDDET NA [employer] [id number] Right 1 Implanted   INSERT TIB CNDYL STBL 3 9 MM KNEE 5/PK X3 TRIATHLON - SNA  INSERT TIB CNDYL STBL 3 9 MM KNEE 5/PK X3 TRIATHLON NA [employer] [id number] Right 1 Implanted         Drains: * No LDAs found *    Findings:  Infection Present At Time Of Surgery (PATOS) (choose all levels that have infection present):  No infection present  Other Findings: severe oa, arthrofibrosis    Detailed Description of Procedure:   Date of Procedure: 7/1/2025    Preoperative range of motion: 15 degrees extension to 90 degrees flexion  Postoperative range of motion: 0 degrees extension to 120 degrees flexion     OPERATIVE INDICATIONS: This is a 59 y.o.female who failed nonoperative management of arthrosis of the

## 2025-07-01 NOTE — PERIOP NOTE
POST ANESTHESIA CARE    DISCHARGE / TRANSFER NOTE  Camilla Santiago was:    [x] discharged        via   [x] Wheelchair          to [x] Private Vehicle     [] transferred    [] Carried    [] Taxi / Vehicle \"for Hire\"  [] Walk out   [] Ambulance / Medical Transportation   [] Stretcher   [] Hospital room _**_           [] Bed      Patient was escorted by:      [x] Nurse   [] Volunteer  [] Transporter / Technician  [] Parent      [] Spouse / Family /      Patient verbalized     [x] appreciation and was very pleased with care received   [] frustration with care received       throughout their stay.    Patient was discharged in     [x] pleasant mood  [] sad mood  [] mad mood .     Pain at discharge/transfer was      3  /10.    Discharge, medication and follow-up instructions were verbalized as understood prior to discharge  (if applicable for same-day procedures being discharged.)    All personal belongings have been returned to patient, and patient/family verbally confirm receiving belongings as all present.

## 2025-07-01 NOTE — H&P
7/1/2025    Chief Complaint: Right knee pain    HPI: This is a 59 y.o. female who complains of right knee pain. Onset was gradual.  The patient has had activity dependent pain for years.    The patient has tried activity modification, physical therapy exercises, injections have failed to provide relief.  The pain is in the knee, it is severe in intensity.  The patient feels unstable with the knee, fears falling, and has significant limitation with activities of daily living, recreation, and walks with a limp.    Past Medical History:   Diagnosis Date    Arthritis of right knee     Asthma     Cellulitis 2017    COVID-19 08/2020    DVT (deep venous thrombosis) ([facility]) 2017    Environmental allergies     Hypertension     Lymphedema of both lower extremities 12/31/2019    Morbid obesity ([facility])     FOREIGN on CPAP     PAF (paroxysmal atrial fibrillation) ([facility])     Pulmonary embolism ([facility]) 2017    PVD (peripheral vascular disease)        Past Surgical History:   Procedure Laterality Date    BREAST BIOPSY Bilateral     2004 stereo bx?     COLONOSCOPY N/A 7/30/2020    COLONOSCOPY performed by Eugenio Mancilla MD at hospitals ENDOSCOPY    ORTHOPEDIC SURGERY      right hand tendon repair and screw in wrist and took piece of bone from hip f       No current facility-administered medications on file prior to encounter.     Current Outpatient Medications on File Prior to Encounter   Medication Sig Dispense Refill    DULoxetine (CYMBALTA) 30 MG extended release capsule Take 1 capsule by mouth 2 times daily      acetaminophen (TYLENOL) 650 MG extended release tablet Take 1 tablet by mouth as needed      albuterol sulfate HFA (PROVENTIL;VENTOLIN;PROAIR) 108 (90 Base) MCG/ACT inhaler Inhale 1 puff into the lungs every 6 hours as needed for Shortness of Breath (season allergies) Seasonal allergies      budesonide-formoterol (SYMBICORT) 80-4.5 MCG/ACT AERO Inhale 2 puffs into the lungs 2 times daily as needed (Patient not taking: Reported on

## 2025-07-14 ENCOUNTER — OFFICE VISIT (OUTPATIENT)
Age: 60
End: 2025-07-14

## 2025-07-14 DIAGNOSIS — Z47.89 ORTHOPEDIC AFTERCARE: Primary | ICD-10-CM

## 2025-07-14 PROCEDURE — 99024 POSTOP FOLLOW-UP VISIT: CPT | Performed by: ORTHOPAEDIC SURGERY

## 2025-07-14 NOTE — PROGRESS NOTES
is here for a follow up visit from a total knee arthroplasty on the right  side.  The patient is doing well, with no medical complications since discharge.  Pain has been appropriate since surgery,and the patient is progressing well with physical therapy.  Patient is ambulating with a walker.    Current Outpatient Medications on File Prior to Visit   Medication Sig Dispense Refill    polyethylene glycol (GLYCOLAX) 17 GM/SCOOP powder Take 17 g by mouth 2 times daily for 15 days 510 g 0    ondansetron (ZOFRAN) 4 MG tablet Take 1 tablet by mouth every 8 hours as needed for Nausea or Vomiting 60 tablet 0    vitamin D (ERGOCALCIFEROL) 1.25 MG (15010 UT) CAPS capsule Take 1 capsule by mouth once a week      NONFORMULARY [facility]ores G1 moderate  dietary supplement 2 tabs every 2-3 days      DULoxetine (CYMBALTA) 30 MG extended release capsule Take 1 capsule by mouth 2 times daily      acetaminophen (TYLENOL) 650 MG extended release tablet Take 1 tablet by mouth as needed      albuterol sulfate HFA (PROVENTIL;VENTOLIN;PROAIR) 108 (90 Base) MCG/ACT inhaler Inhale 1 puff into the lungs every 6 hours as needed for Shortness of Breath (season allergies) Seasonal allergies      furosemide (LASIX) 20 MG tablet Take 1 tablet by mouth as needed (ankle swelling)      metoprolol succinate (TOPROL XL) 50 MG extended release tablet Take 1 tablet by mouth nightly      rivaroxaban (XARELTO) 20 MG TABS tablet Take 1 tablet by mouth daily For h/o DVT and PE 2017      spironolactone (ALDACTONE) 25 MG tablet Take 1 tablet by mouth every morning      triamcinolone (KENALOG) 0.1 % cream ceived the following from [facility]: Outside name: triamcinolone acetonide (KENALOG) 0.1 % topical cream       No current facility-administered medications on file prior to visit.       ROS:  General: denies agitation, major chest pain, unexpected weakness  Patient complains of left knee pain which is managed with

## 2025-07-14 NOTE — PROGRESS NOTES
Identified pt with two pt identifiers (name and ). Reviewed chart in preparation for visit and have obtained necessary documentation.    Camilla Santiago is a 59 y.o. female Post-Op Check (Sx 25 right Total knee Arthroplasty )  .    There were no vitals filed for this visit.       1. Have you been to the ER, urgent care clinic since your last visit?  Hospitalized since your last visit?  no     2. Have you seen or consulted any other health care providers outside of the [facility] since your last visit?  Include any pap smears or colon screening.  no

## 2025-08-06 ENCOUNTER — OFFICE VISIT (OUTPATIENT)
Age: 60
End: 2025-08-06

## 2025-08-06 VITALS — WEIGHT: 224 LBS | BODY MASS INDEX: 43.75 KG/M2

## 2025-08-06 DIAGNOSIS — Z47.89 ORTHOPEDIC AFTERCARE: ICD-10-CM

## 2025-08-06 DIAGNOSIS — M17.11 UNILATERAL PRIMARY OSTEOARTHRITIS, RIGHT KNEE: Primary | ICD-10-CM

## 2025-08-06 PROCEDURE — 99024 POSTOP FOLLOW-UP VISIT: CPT | Performed by: ORTHOPAEDIC SURGERY

## 2025-08-06 ASSESSMENT — PATIENT HEALTH QUESTIONNAIRE - PHQ9
1. LITTLE INTEREST OR PLEASURE IN DOING THINGS: NOT AT ALL
SUM OF ALL RESPONSES TO PHQ QUESTIONS 1-9: 0
2. FEELING DOWN, DEPRESSED OR HOPELESS: NOT AT ALL
SUM OF ALL RESPONSES TO PHQ QUESTIONS 1-9: 0

## 2025-09-03 ENCOUNTER — OFFICE VISIT (OUTPATIENT)
Age: 60
End: 2025-09-03
Payer: MEDICARE

## 2025-09-03 ENCOUNTER — TELEPHONE (OUTPATIENT)
Age: 60
End: 2025-09-03

## 2025-09-03 DIAGNOSIS — M17.12 UNILATERAL PRIMARY OSTEOARTHRITIS, LEFT KNEE: Primary | ICD-10-CM

## 2025-09-03 PROCEDURE — G8417 CALC BMI ABV UP PARAM F/U: HCPCS | Performed by: ORTHOPAEDIC SURGERY

## 2025-09-03 PROCEDURE — G8427 DOCREV CUR MEDS BY ELIG CLIN: HCPCS | Performed by: ORTHOPAEDIC SURGERY

## 2025-09-03 PROCEDURE — 99213 OFFICE O/P EST LOW 20 MIN: CPT | Performed by: ORTHOPAEDIC SURGERY

## 2025-09-03 PROCEDURE — 3017F COLORECTAL CA SCREEN DOC REV: CPT | Performed by: ORTHOPAEDIC SURGERY

## 2025-09-03 PROCEDURE — 1036F TOBACCO NON-USER: CPT | Performed by: ORTHOPAEDIC SURGERY

## 2025-09-03 ASSESSMENT — PATIENT HEALTH QUESTIONNAIRE - PHQ9
SUM OF ALL RESPONSES TO PHQ QUESTIONS 1-9: 0
2. FEELING DOWN, DEPRESSED OR HOPELESS: NOT AT ALL
SUM OF ALL RESPONSES TO PHQ QUESTIONS 1-9: 0
SUM OF ALL RESPONSES TO PHQ QUESTIONS 1-9: 0
1. LITTLE INTEREST OR PLEASURE IN DOING THINGS: NOT AT ALL
SUM OF ALL RESPONSES TO PHQ QUESTIONS 1-9: 0

## 2025-09-04 ENCOUNTER — TELEPHONE (OUTPATIENT)
Age: 60
End: 2025-09-04

## 2025-09-04 PROBLEM — M17.12 UNILATERAL PRIMARY OSTEOARTHRITIS, LEFT KNEE: Status: ACTIVE | Noted: 2025-09-04

## (undated) DEVICE — SPONGE GZ W4XL4IN COT 12 PLY TYP VII WVN C FLD DSGN STERILE

## (undated) DEVICE — TOTAL JOINT-SFMC: Brand: MEDLINE INDUSTRIES, INC.

## (undated) DEVICE — GLOVE SURG SZ 85 L12IN FNGR ORTHO 126MIL CRM LTX FREE

## (undated) DEVICE — COVER C ARM W36XL28IN BND BG SNAP KOVER

## (undated) DEVICE — PIN FIX L3.5IN DIA1/8IN LNG HDLSS FOR MIS KNEE JT REPL - STERILE PK OF 4

## (undated) DEVICE — SYR 10ML LUER LOK 1/5ML GRAD --

## (undated) DEVICE — SUTURE ABSORBABLE MONOFILAMENT 1 CTX 36 CM 48 MM VIO PDS +

## (undated) DEVICE — ELECTRODE,RADIOTRANSLUCENT,FOAM,5PK: Brand: MEDLINE

## (undated) DEVICE — PENCIL SMK EVAC ALL IN 1 DSGN ENH VISIBILITY IMPROVED AIR

## (undated) DEVICE — Device

## (undated) DEVICE — Device: Brand: JELCO

## (undated) DEVICE — SOLIDIFIER MEDC 1200ML -- CONVERT TO 356117

## (undated) DEVICE — SYR 3ML LL TIP 1/10ML GRAD --

## (undated) DEVICE — STRIP,CLOSURE,WOUND,MEDI-STRIP,1/2X4: Brand: MEDLINE

## (undated) DEVICE — SOLUTION WND IRRIGATION 450 ML 0.5 PVP-I 0.9 NACL

## (undated) DEVICE — GLOVE SURG SZ 85 L12IN FNGR THK79MIL GRN LTX FREE

## (undated) DEVICE — SYRINGE 50ML E/T

## (undated) DEVICE — 4-PORT MANIFOLD: Brand: NEPTUNE 2

## (undated) DEVICE — DRAPE,REIN 53X77,STERILE: Brand: MEDLINE

## (undated) DEVICE — GOWN,SIRUS,NONRNF,XLN/2XL,18/CS: Brand: MEDLINE

## (undated) DEVICE — YANKAUER,SMOOTH HANDLE,HIGH CAPACITY: Brand: MEDLINE INDUSTRIES, INC.

## (undated) DEVICE — SET ADMIN 16ML TBNG L100IN 2 Y INJ SITE IV PIGGY BK DISP

## (undated) DEVICE — SOLUTION IRRIG 1000ML H2O PIC PLAS SHATTERPROOF CONTAINER

## (undated) DEVICE — SOLUTION SURG PREP 26 CC PURPREP

## (undated) DEVICE — Z DISCONTINUED PER MEDLINE LINE GAS SAMPLING O2/CO2 LNG AD 13 FT NSL W/ TBNG FILTERLINE

## (undated) DEVICE — 1200 GUARD II KIT W/5MM TUBE W/O VAC TUBE: Brand: GUARDIAN

## (undated) DEVICE — BASIN EMSIS 16OZ GRAPHITE PLAS KID SHP MOLD GRAD FOR ORAL

## (undated) DEVICE — SUTURE MONOCRYL SZ 3-0 L27IN ABSRB UD L24MM PS-1 3/8 CIR PRIM Y936H

## (undated) DEVICE — SYRINGE MED 30ML STD CLR PLAS LUERLOCK TIP N CTRL DISP

## (undated) DEVICE — SUTURE VICRYL SZ 1 L36IN ABSRB UD L36MM CT-1 1/2 CIR J947H

## (undated) DEVICE — SHEET, DRAPE, SPLIT, STERILE: Brand: MEDLINE

## (undated) DEVICE — TOWEL 4 PLY TISS 19X30 SUE WHT

## (undated) DEVICE — NEONATAL-ADULT SPO2 SENSOR: Brand: NELLCOR

## (undated) DEVICE — SOLUTION IV 1000 ML 0.9 NACL INJ USP EXCEL PLAS CONTAINER

## (undated) DEVICE — HOOD SURG T7

## (undated) DEVICE — NEEDLE HYPO 18GA L1.5IN PNK S STL HUB POLYPR SHLD REG BVL

## (undated) DEVICE — SOLUTION SCRB 4% CHG RED ANTIMIC SKIN CLN PREOPERATIVE DISP

## (undated) DEVICE — SUTURE VICRYL SZ 2-0 L36IN ABSRB UD L36MM CT-1 1/2 CIR J945H

## (undated) DEVICE — CATH IV AUTOGRD BC PNK 20GA 25 -- INSYTE

## (undated) DEVICE — 3M™ TEGADERM™ TRANSPARENT FILM DRESSING FRAME STYLE, 1627, 4 IN X 10 IN (10 CM X 25 CM), 20/CT 4CT/CASE: Brand: 3M™ TEGADERM™

## (undated) DEVICE — ELECTRODE BLDE L4IN NONINSULATED EDGE

## (undated) DEVICE — STRYKER PERFORMANCE SERIES SAGITTAL BLADE: Brand: STRYKER PERFORMANCE SERIES

## (undated) DEVICE — ZIMMER® STERILE DISPOSABLE TOURNIQUET CUFF WITH PROTECTIVE SLEEVE AND PLC, DUAL PORT, SINGLE BLADDER, 34 IN. (86 CM)